# Patient Record
Sex: MALE | Race: WHITE | Employment: UNEMPLOYED | URBAN - METROPOLITAN AREA
[De-identification: names, ages, dates, MRNs, and addresses within clinical notes are randomized per-mention and may not be internally consistent; named-entity substitution may affect disease eponyms.]

---

## 2017-01-26 PROBLEM — G56.00 CARPAL TUNNEL SYNDROME: Status: ACTIVE | Noted: 2017-01-26

## 2017-01-26 PROBLEM — G56.20 CUBITAL TUNNEL SYNDROME: Status: ACTIVE | Noted: 2017-01-26

## 2017-03-10 PROBLEM — Z72.0 TOBACCO USE: Status: ACTIVE | Noted: 2017-03-10

## 2018-02-12 PROBLEM — R53.83 FATIGUE: Status: ACTIVE | Noted: 2018-02-12

## 2018-03-13 ENCOUNTER — TELEPHONE (OUTPATIENT)
Dept: FAMILY MEDICINE CLINIC | Age: 38
End: 2018-03-13

## 2018-03-16 ENCOUNTER — TELEPHONE (OUTPATIENT)
Dept: FAMILY MEDICINE CLINIC | Age: 38
End: 2018-03-16

## 2018-03-16 NOTE — TELEPHONE ENCOUNTER
Spoke with wife, Alireza Bowers and she stated that pt has a lump under his armpit area that is tender. Advised Ready care and wife was in agreement.

## 2018-04-11 ENCOUNTER — TELEPHONE (OUTPATIENT)
Dept: FAMILY MEDICINE CLINIC | Age: 38
End: 2018-04-11

## 2018-04-30 ENCOUNTER — HOSPITAL ENCOUNTER (OUTPATIENT)
Dept: MRI IMAGING | Age: 38
Discharge: HOME | End: 2018-05-02
Payer: COMMERCIAL

## 2018-04-30 DIAGNOSIS — M54.12 CERVICAL RADICULOPATHY: ICD-10-CM

## 2018-04-30 DIAGNOSIS — M54.2 NECK PAIN: ICD-10-CM

## 2018-04-30 PROCEDURE — 72141 MRI NECK SPINE W/O DYE: CPT

## 2018-05-03 ENCOUNTER — HOSPITAL ENCOUNTER (EMERGENCY)
Age: 38
Discharge: HOME | End: 2018-05-03
Payer: COMMERCIAL

## 2018-05-03 VITALS
DIASTOLIC BLOOD PRESSURE: 90 MMHG | OXYGEN SATURATION: 98 % | HEIGHT: 70 IN | WEIGHT: 192 LBS | TEMPERATURE: 97.9 F | SYSTOLIC BLOOD PRESSURE: 134 MMHG | HEART RATE: 83 BPM | RESPIRATION RATE: 14 BRPM | BODY MASS INDEX: 27.49 KG/M2

## 2018-05-03 DIAGNOSIS — M54.2 NECK PAIN: Primary | ICD-10-CM

## 2018-05-03 PROCEDURE — 99282 EMERGENCY DEPT VISIT SF MDM: CPT

## 2018-05-03 PROCEDURE — 6360000002 HC RX W HCPCS: Performed by: NURSE PRACTITIONER

## 2018-05-03 PROCEDURE — 96372 THER/PROPH/DIAG INJ SC/IM: CPT

## 2018-05-03 RX ORDER — DEXAMETHASONE SODIUM PHOSPHATE 10 MG/ML
10 INJECTION INTRAMUSCULAR; INTRAVENOUS ONCE
Status: COMPLETED | OUTPATIENT
Start: 2018-05-03 | End: 2018-05-03

## 2018-05-03 RX ORDER — NAPROXEN 500 MG/1
500 TABLET ORAL 2 TIMES DAILY
Qty: 14 TABLET | Refills: 0 | Status: SHIPPED | OUTPATIENT
Start: 2018-05-03 | End: 2018-07-17 | Stop reason: ALTCHOICE

## 2018-05-03 RX ORDER — KETOROLAC TROMETHAMINE 30 MG/ML
30 INJECTION, SOLUTION INTRAMUSCULAR; INTRAVENOUS ONCE
Status: COMPLETED | OUTPATIENT
Start: 2018-05-03 | End: 2018-05-03

## 2018-05-03 RX ORDER — NAPROXEN 250 MG/1
500 TABLET ORAL ONCE
Status: DISCONTINUED | OUTPATIENT
Start: 2018-05-03 | End: 2018-05-03

## 2018-05-03 RX ORDER — METAXALONE 800 MG/1
800 TABLET ORAL 3 TIMES DAILY
Qty: 15 TABLET | Refills: 0 | Status: SHIPPED | OUTPATIENT
Start: 2018-05-03 | End: 2018-05-08

## 2018-05-03 RX ORDER — METHYLPREDNISOLONE 4 MG/1
TABLET ORAL
Qty: 21 TABLET | Status: SHIPPED | OUTPATIENT
Start: 2018-05-03 | End: 2018-05-09

## 2018-05-03 RX ORDER — ORPHENADRINE CITRATE 30 MG/ML
60 INJECTION INTRAMUSCULAR; INTRAVENOUS ONCE
Status: COMPLETED | OUTPATIENT
Start: 2018-05-03 | End: 2018-05-03

## 2018-05-03 RX ADMIN — DEXAMETHASONE SODIUM PHOSPHATE 10 MG: 10 INJECTION INTRAMUSCULAR; INTRAVENOUS at 13:23

## 2018-05-03 RX ADMIN — ORPHENADRINE CITRATE 60 MG: 30 INJECTION INTRAMUSCULAR; INTRAVENOUS at 13:24

## 2018-05-03 RX ADMIN — KETOROLAC TROMETHAMINE 30 MG: 30 INJECTION, SOLUTION INTRAMUSCULAR at 13:23

## 2018-05-03 ASSESSMENT — PAIN DESCRIPTION - PAIN TYPE: TYPE: ACUTE PAIN

## 2018-05-03 ASSESSMENT — PAIN DESCRIPTION - LOCATION: LOCATION: NECK

## 2018-05-03 ASSESSMENT — PAIN SCALES - GENERAL: PAINLEVEL_OUTOF10: 9

## 2018-05-04 ENCOUNTER — CARE COORDINATION (OUTPATIENT)
Dept: CARE COORDINATION | Age: 38
End: 2018-05-04

## 2018-05-08 ENCOUNTER — OFFICE VISIT (OUTPATIENT)
Dept: NEUROSURGERY | Age: 38
End: 2018-05-08
Payer: COMMERCIAL

## 2018-05-08 VITALS
BODY MASS INDEX: 27.2 KG/M2 | DIASTOLIC BLOOD PRESSURE: 96 MMHG | HEIGHT: 70 IN | HEART RATE: 79 BPM | SYSTOLIC BLOOD PRESSURE: 131 MMHG | WEIGHT: 190 LBS

## 2018-05-08 DIAGNOSIS — M54.2 NECK PAIN: Primary | ICD-10-CM

## 2018-05-08 PROCEDURE — 99215 OFFICE O/P EST HI 40 MIN: CPT | Performed by: NEUROLOGICAL SURGERY

## 2018-05-08 PROCEDURE — G8419 CALC BMI OUT NRM PARAM NOF/U: HCPCS | Performed by: NEUROLOGICAL SURGERY

## 2018-05-08 PROCEDURE — 4004F PT TOBACCO SCREEN RCVD TLK: CPT | Performed by: NEUROLOGICAL SURGERY

## 2018-05-08 PROCEDURE — G8427 DOCREV CUR MEDS BY ELIG CLIN: HCPCS | Performed by: NEUROLOGICAL SURGERY

## 2018-05-08 RX ORDER — HYDROCODONE BITARTRATE AND ACETAMINOPHEN 5; 325 MG/1; MG/1
2 TABLET ORAL EVERY 6 HOURS PRN
Qty: 56 TABLET | Refills: 0 | Status: SHIPPED | OUTPATIENT
Start: 2018-05-08 | End: 2018-05-15

## 2018-05-08 ASSESSMENT — ENCOUNTER SYMPTOMS
SHORTNESS OF BREATH: 1
EYES NEGATIVE: 1
TROUBLE SWALLOWING: 0
VISUAL CHANGE: 0
GASTROINTESTINAL NEGATIVE: 1

## 2018-05-15 ENCOUNTER — TELEPHONE (OUTPATIENT)
Dept: NEUROSURGERY | Age: 38
End: 2018-05-15

## 2018-05-18 ENCOUNTER — TELEPHONE (OUTPATIENT)
Dept: FAMILY MEDICINE CLINIC | Age: 38
End: 2018-05-18

## 2018-05-18 DIAGNOSIS — Z72.0 TOBACCO USE: Primary | ICD-10-CM

## 2018-05-18 RX ORDER — NICOTINE 21 MG/24HR
1 PATCH, TRANSDERMAL 24 HOURS TRANSDERMAL EVERY 24 HOURS
Qty: 30 PATCH | Refills: 1 | Status: ON HOLD | OUTPATIENT
Start: 2018-05-18 | End: 2018-07-21 | Stop reason: HOSPADM

## 2018-06-19 ENCOUNTER — TELEPHONE (OUTPATIENT)
Dept: NEUROSURGERY | Age: 38
End: 2018-06-19

## 2018-06-25 ENCOUNTER — TELEPHONE (OUTPATIENT)
Dept: NEUROSURGERY | Age: 38
End: 2018-06-25

## 2018-06-25 NOTE — TELEPHONE ENCOUNTER
Pt. Wife called in wanting to know if her  Dena Merrill can have his surgery sooner than scheduled. She also stated he is in pain and even though she was explained that Dr. Alesha Portillo prescribe pain med, she want to know if he could get pain meds. Because he is in a lot of pain.     Audrey: [phone]

## 2018-06-27 ENCOUNTER — PREP FOR PROCEDURE (OUTPATIENT)
Dept: NEUROSURGERY | Age: 38
End: 2018-06-27

## 2018-06-27 DIAGNOSIS — M50.20 CERVICAL HERNIATED DISC: Primary | ICD-10-CM

## 2018-06-27 RX ORDER — SODIUM CHLORIDE 0.9 % (FLUSH) 0.9 %
10 SYRINGE (ML) INJECTION EVERY 12 HOURS SCHEDULED
Status: CANCELLED | OUTPATIENT
Start: 2018-06-27 | End: 2019-06-27

## 2018-06-27 RX ORDER — SODIUM CHLORIDE 9 MG/ML
INJECTION, SOLUTION INTRAVENOUS CONTINUOUS
Status: CANCELLED | OUTPATIENT
Start: 2018-06-27 | End: 2019-06-27

## 2018-06-27 RX ORDER — SODIUM CHLORIDE 0.9 % (FLUSH) 0.9 %
10 SYRINGE (ML) INJECTION PRN
Status: CANCELLED | OUTPATIENT
Start: 2018-06-27 | End: 2019-06-27

## 2018-06-29 DIAGNOSIS — M50.20 CERVICAL HERNIATED DISC: Primary | ICD-10-CM

## 2018-07-17 ENCOUNTER — ANESTHESIA EVENT (OUTPATIENT)
Dept: OPERATING ROOM | Age: 38
End: 2018-07-17
Payer: COMMERCIAL

## 2018-07-17 ENCOUNTER — HOSPITAL ENCOUNTER (OUTPATIENT)
Dept: PREADMISSION TESTING | Age: 38
Discharge: HOME | End: 2018-07-17
Payer: COMMERCIAL

## 2018-07-17 ENCOUNTER — HOSPITAL ENCOUNTER (OUTPATIENT)
Dept: GENERAL RADIOLOGY | Age: 38
Discharge: HOME | End: 2018-07-19
Payer: COMMERCIAL

## 2018-07-17 VITALS
HEART RATE: 65 BPM | SYSTOLIC BLOOD PRESSURE: 131 MMHG | OXYGEN SATURATION: 97 % | DIASTOLIC BLOOD PRESSURE: 91 MMHG | WEIGHT: 193 LBS | BODY MASS INDEX: 27.02 KG/M2 | TEMPERATURE: 98.2 F | HEIGHT: 71 IN | RESPIRATION RATE: 20 BRPM

## 2018-07-17 DIAGNOSIS — Z01.812 PRE-OPERATIVE LABORATORY EXAMINATION: Primary | ICD-10-CM

## 2018-07-17 DIAGNOSIS — M50.20 CERVICAL HERNIATED DISC: ICD-10-CM

## 2018-07-17 LAB
ABO/RH: NORMAL
ALBUMIN SERPL-MCNC: 4.5 G/DL (ref 3.5–5.2)
ALP BLD-CCNC: 79 U/L (ref 40–129)
ALT SERPL-CCNC: 27 U/L (ref 0–40)
ANION GAP SERPL CALCULATED.3IONS-SCNC: 11 MMOL/L (ref 7–16)
ANTIBODY SCREEN: NORMAL
APTT: 30.9 SEC (ref 24.5–35.1)
AST SERPL-CCNC: 19 U/L (ref 0–39)
BASOPHILS ABSOLUTE: 0.04 E9/L (ref 0–0.2)
BASOPHILS RELATIVE PERCENT: 0.4 % (ref 0–2)
BILIRUB SERPL-MCNC: 0.3 MG/DL (ref 0–1.2)
BILIRUBIN URINE: NEGATIVE
BLOOD, URINE: NEGATIVE
BUN BLDV-MCNC: 10 MG/DL (ref 6–20)
CALCIUM SERPL-MCNC: 9.6 MG/DL (ref 8.6–10.2)
CHLORIDE BLD-SCNC: 102 MMOL/L (ref 98–107)
CLARITY: CLEAR
CO2: 27 MMOL/L (ref 22–29)
COLOR: YELLOW
CREAT SERPL-MCNC: 0.8 MG/DL (ref 0.7–1.2)
EKG ATRIAL RATE: 64 BPM
EKG P AXIS: 21 DEGREES
EKG P-R INTERVAL: 158 MS
EKG Q-T INTERVAL: 418 MS
EKG QRS DURATION: 104 MS
EKG QTC CALCULATION (BAZETT): 431 MS
EKG R AXIS: 27 DEGREES
EKG T AXIS: 43 DEGREES
EKG VENTRICULAR RATE: 64 BPM
EOSINOPHILS ABSOLUTE: 0.53 E9/L (ref 0.05–0.5)
EOSINOPHILS RELATIVE PERCENT: 5.8 % (ref 0–6)
GFR AFRICAN AMERICAN: >60
GFR NON-AFRICAN AMERICAN: >60 ML/MIN/1.73
GLUCOSE BLD-MCNC: 84 MG/DL (ref 74–109)
GLUCOSE URINE: NEGATIVE MG/DL
HCT VFR BLD CALC: 42.7 % (ref 37–54)
HEMOGLOBIN: 14.8 G/DL (ref 12.5–16.5)
IMMATURE GRANULOCYTES #: 0.02 E9/L
IMMATURE GRANULOCYTES %: 0.2 % (ref 0–5)
INR BLD: 1
KETONES, URINE: NEGATIVE MG/DL
LEUKOCYTE ESTERASE, URINE: NEGATIVE
LYMPHOCYTES ABSOLUTE: 3.04 E9/L (ref 1.5–4)
LYMPHOCYTES RELATIVE PERCENT: 33 % (ref 20–42)
MCH RBC QN AUTO: 29.8 PG (ref 26–35)
MCHC RBC AUTO-ENTMCNC: 34.7 % (ref 32–34.5)
MCV RBC AUTO: 85.9 FL (ref 80–99.9)
MONOCYTES ABSOLUTE: 0.52 E9/L (ref 0.1–0.95)
MONOCYTES RELATIVE PERCENT: 5.7 % (ref 2–12)
NEUTROPHILS ABSOLUTE: 5.05 E9/L (ref 1.8–7.3)
NEUTROPHILS RELATIVE PERCENT: 54.9 % (ref 43–80)
NITRITE, URINE: NEGATIVE
PDW BLD-RTO: 13.2 FL (ref 11.5–15)
PH UA: 5.5 (ref 5–9)
PLATELET # BLD: 316 E9/L (ref 130–450)
PMV BLD AUTO: 9.2 FL (ref 7–12)
POTASSIUM SERPL-SCNC: 3.5 MMOL/L (ref 3.5–5)
PROTEIN UA: NEGATIVE MG/DL
PROTHROMBIN TIME: 11.9 SEC (ref 9.3–12.4)
RBC # BLD: 4.97 E12/L (ref 3.8–5.8)
SODIUM BLD-SCNC: 140 MMOL/L (ref 132–146)
SPECIFIC GRAVITY UA: >=1.03 (ref 1–1.03)
TOTAL PROTEIN: 7.2 G/DL (ref 6.4–8.3)
UROBILINOGEN, URINE: 0.2 E.U./DL
WBC # BLD: 9.2 E9/L (ref 4.5–11.5)

## 2018-07-17 PROCEDURE — 86850 RBC ANTIBODY SCREEN: CPT

## 2018-07-17 PROCEDURE — 87081 CULTURE SCREEN ONLY: CPT

## 2018-07-17 PROCEDURE — 36415 COLL VENOUS BLD VENIPUNCTURE: CPT

## 2018-07-17 PROCEDURE — 85730 THROMBOPLASTIN TIME PARTIAL: CPT

## 2018-07-17 PROCEDURE — 81003 URINALYSIS AUTO W/O SCOPE: CPT

## 2018-07-17 PROCEDURE — 87088 URINE BACTERIA CULTURE: CPT

## 2018-07-17 PROCEDURE — 86900 BLOOD TYPING SEROLOGIC ABO: CPT

## 2018-07-17 PROCEDURE — 85610 PROTHROMBIN TIME: CPT

## 2018-07-17 PROCEDURE — 80053 COMPREHEN METABOLIC PANEL: CPT

## 2018-07-17 PROCEDURE — 71046 X-RAY EXAM CHEST 2 VIEWS: CPT

## 2018-07-17 PROCEDURE — 85025 COMPLETE CBC W/AUTO DIFF WBC: CPT

## 2018-07-17 PROCEDURE — 86901 BLOOD TYPING SEROLOGIC RH(D): CPT

## 2018-07-17 ASSESSMENT — PAIN DESCRIPTION - DESCRIPTORS: DESCRIPTORS: ACHING;CONSTANT

## 2018-07-17 ASSESSMENT — PAIN DESCRIPTION - ORIENTATION: ORIENTATION: LEFT

## 2018-07-17 ASSESSMENT — PAIN SCALES - GENERAL: PAINLEVEL_OUTOF10: 7

## 2018-07-17 ASSESSMENT — PAIN DESCRIPTION - LOCATION: LOCATION: NECK;SHOULDER;ARM

## 2018-07-17 ASSESSMENT — PAIN DESCRIPTION - PAIN TYPE: TYPE: ACUTE PAIN

## 2018-07-17 NOTE — PROGRESS NOTES
Brittani 35 PRE-ADMISSION TESTING GENERAL INSTRUCTIONS- [facility]-phone number:[phone]    GENERAL INSTRUCTIONS  [x] Nhan wipe instruction sheet and wipes given. [x] Nothing by mouth after midnight, including gum, candy, mints, or water. [x] You may brush your teeth, gargle, but do NOT swallow water. .   [x]No smoking, chewing tobacco, illegal drugs, or alcohol within 24 hours of your surgery. [x] Jewelry, valuables or body piercing's should not be brought to the hospital. All body and/or tongue piercing's must be removed prior to arriving to hospital.  ALL hair pins must be removed. [x] Do not wear makeup, lotions, powders, deodorant. Nail polish as directed by the nurse. [x] Arrange transportation to and from the hospital.  Arrange for someone to be with you for the remainder of the day and for 24 hours after your procedure due to having had anesthesia. .  [x] Transfusion Bracelet: Please bring with you to hospital, day of surgery            PARKING INSTRUCTIONS:   · [x] Arrival [id number]  · [x] Parking lot 1 is located on [facility] (the corner of [facility]). To enter, press the button and the gate will lift. A free token will be provided to exit the lot. One car per patient is allowed to park in this lot. All other cars are to park on [address] [place] [address] either in the parking garage or the handicap lot. [] Free  parking is available on [address] [place] [address]. · [] To reach the [place] lobby from [address] [place] [address], upon entering the hospital, take elevator B to the 3rd floor. EDUCATION INSTRUCTIONS:      [] Knee or hip replacement booklet & exercise pamphlets given. [x] Megan 77 placed in chart. [x] Pre-admission Testing educational folder given  [x] Incentive Spirometry,coughing & deep breathing exercises reviewed.      [x]Medication information sheet(s)   [x]Fluoroscopy-Xray used in surgery reviewed with patient. Educational pamphlet placed in chart. [x]Pain: Post-op pain is normal and to be expected. You will be asked to rate your pain from 0-10(a zero is not acceptable-education is needed). Your post-op pain goal is:  [x] Ask your nurse for your pain medication. MEDICATION INSTRUCTIONS:   [x]Bring a complete list of your medications, please write the last time you took the medicine, give this list to the nurse. [x] Take the following medications the morning of surgery with 1-2 ounces of water:   [x] Stop herbal supplements and vitamins 5 days before your surgery. [] DO NOT take any diabetic medicine the morning of surgery. Follow instructions for insulin the day before surgery. [] If you are diabetic and your blood sugar is low or you feel symptomatic, you may drink 1-2 ounces of apple juice or take a glucose tablet. The morning of your procedure, you may call the pre-op area if you have concerns about your blood sugar [phone]. [] Use your inhalers the morning of surgery. Bring your emergency inhaler with you day of surgery. [x] Follow physician instructions regarding any blood thinners you may be taking. WHAT TO EXPECT:  [x] The day of surgery you will be greeted and  checked in by the [employer] .  A nurse will greet you in accordance to the time you are needed in the pre-op area to prepare you for surgery. Please do not be discouraged if you are not greeted in the order you arrive as there are many variables that are involved in patient preparation. Your patience is greatly appreciated as you wait for your nurse. Please bring in items such as: books, magazines, newspapers, electronics, or any other items  to occupy your time in the waiting area. [x]  Delays may occur with surgery and staff will make a sincere effort to keep you informed of delays.   If any delays occur with your procedure, we apologize ahead of time for your inconvenience as we recognize the value of your time.

## 2018-07-17 NOTE — ANESTHESIA PRE PROCEDURE
Allergies: Allergies   Allergen Reactions    Latex Rash     condoms    Pcn [Penicillins] Hives       Problem List:    Patient Active Problem List   Diagnosis Code    Pinched nerve G58.9    Palpitation R00.2    Essential hypertension I10    Chronic low back pain M54.5, G89.29    Lumbar radiculopathy M54.16    History of lumbar laminectomy/discectomy L4-5, left Z98.890    Carpal tunnel syndrome G56.00    Cubital tunnel syndrome G56.20    Tobacco use Z72.0    Fatigue R53.83       Past Medical History:        Diagnosis Date    Anxiety     Benign tumor of back     Headache(784.0)     HTN (hypertension) 2/19/2015    Hypertension     Pain induced    Neuropathy ([address]     right ulnar    Palpitation 2/19/2015    Right carpal tunnel syndrome        Past Surgical History:        Procedure Laterality Date    BACK SURGERY      BREAST LUMPECTOMY      KIDNEY SURGERY Left     lithotripsy    LUMBAR LAMINECTOMY  4/22/2015    discectomy L5 - S1       Social History:    Social History   Substance Use Topics    Smoking status: Former Smoker     Packs/day: 1.00     Types: Cigarettes    Smokeless tobacco: Never Used    Alcohol use No                                Counseling given: Not Answered      Vital Signs (Current):   Vitals:    07/17/18 0936   BP: (!) 131/91   Pulse: 65   Resp: 20   Temp: 98.2 °F (36.8 °C)   TempSrc: Oral   SpO2: 97%   Weight: 193 lb (87.5 kg)   Height: 5' 11\" (1.803 m)                                              BP Readings from Last 3 Encounters:   07/17/18 (!) 131/91   05/08/18 (!) 131/96   05/03/18 (!) 134/90       NPO Status:                                                                                 BMI:   Wt Readings from Last 3 Encounters:   07/17/18 193 lb (87.5 kg)   05/08/18 190 lb (86.2 kg)   05/03/18 192 lb (87.1 kg)     Body mass index is 26.92 kg/m².     CBC:   Lab Results   Component Value Date    WBC 11.4 02/19/2018    RBC 5.09 02/19/2018    HGB 15.1

## 2018-07-18 LAB — MRSA CULTURE ONLY: NORMAL

## 2018-07-19 LAB — URINE CULTURE, ROUTINE: NORMAL

## 2018-07-19 NOTE — H&P
Normal range of motion. He exhibits no edema, tenderness or deformity. Lymphadenopathy:     He has no cervical adenopathy. Neurological: He is alert and oriented to person, place, and time. He has normal strength. He is not disoriented. He displays no atrophy and normal reflexes. No cranial nerve deficit or sensory deficit. He exhibits normal muscle tone. He displays a negative Romberg sign. He displays no seizure activity. Coordination normal. GCS eye subscore is 4. GCS verbal subscore is 5. GCS motor subscore is 6. He displays no Babinski's sign on the left side. Reflex Scores:       Tricep reflexes are 2+ on the right side and 2+ on the left side. Bicep reflexes are 2+ on the right side and 2+ on the left side. Brachioradialis reflexes are 2+ on the right side and 2+ on the left side. Patellar reflexes are 2+ on the right side and 2+ on the left side. Achilles reflexes are 2+ on the right side and 2+ on the left side. Skin: Skin is warm and dry. No rash noted. He is not diaphoretic. No erythema. No pallor. Psychiatric: He has a normal mood and affect. His behavior is normal. Judgment and thought content normal.   Vitals reviewed.        Assessment:   44year old male who presents with neck pain that radiates into his arms. He is neurologically intact. His MRI shows a herniated disk at C5-C6. Plan:   I will send him to PT and pain management and if he fails this, he will need a C5-C6 anterior cervical diskectomy and fusion. R/B/A of surgery have been discussed with the patient and he wishes to proceed with surgery.

## 2018-07-20 ENCOUNTER — APPOINTMENT (OUTPATIENT)
Dept: GENERAL RADIOLOGY | Age: 38
End: 2018-07-20
Attending: NEUROLOGICAL SURGERY
Payer: COMMERCIAL

## 2018-07-20 ENCOUNTER — ANESTHESIA (OUTPATIENT)
Dept: OPERATING ROOM | Age: 38
End: 2018-07-20
Payer: COMMERCIAL

## 2018-07-20 ENCOUNTER — HOSPITAL ENCOUNTER (OUTPATIENT)
Age: 38
Discharge: HOME | End: 2018-07-21
Attending: NEUROLOGICAL SURGERY | Admitting: NEUROLOGICAL SURGERY
Payer: COMMERCIAL

## 2018-07-20 VITALS
OXYGEN SATURATION: 97 % | TEMPERATURE: 95.5 F | DIASTOLIC BLOOD PRESSURE: 86 MMHG | RESPIRATION RATE: 5 BRPM | SYSTOLIC BLOOD PRESSURE: 119 MMHG

## 2018-07-20 DIAGNOSIS — M50.20 CERVICAL HERNIATED DISC: Primary | ICD-10-CM

## 2018-07-20 DIAGNOSIS — T14.8XXA FRACTURE: ICD-10-CM

## 2018-07-20 PROCEDURE — 2580000003 HC RX 258: Performed by: PHYSICIAN ASSISTANT

## 2018-07-20 PROCEDURE — 6360000002 HC RX W HCPCS

## 2018-07-20 PROCEDURE — 6360000002 HC RX W HCPCS: Performed by: NEUROLOGICAL SURGERY

## 2018-07-20 PROCEDURE — 3600000004 HC SURGERY LEVEL 4 BASE: Performed by: NEUROLOGICAL SURGERY

## 2018-07-20 PROCEDURE — G8987 SELF CARE CURRENT STATUS: HCPCS

## 2018-07-20 PROCEDURE — 3700000001 HC ADD 15 MINUTES (ANESTHESIA): Performed by: NEUROLOGICAL SURGERY

## 2018-07-20 PROCEDURE — 97535 SELF CARE MNGMENT TRAINING: CPT

## 2018-07-20 PROCEDURE — 2700000000 HC OXYGEN THERAPY PER DAY

## 2018-07-20 PROCEDURE — 3209999900 FLUORO FOR SURGICAL PROCEDURES

## 2018-07-20 PROCEDURE — 2500000003 HC RX 250 WO HCPCS

## 2018-07-20 PROCEDURE — 2580000003 HC RX 258: Performed by: NEUROLOGICAL SURGERY

## 2018-07-20 PROCEDURE — G8988 SELF CARE GOAL STATUS: HCPCS

## 2018-07-20 PROCEDURE — 7100000000 HC PACU RECOVERY - FIRST 15 MIN: Performed by: NEUROLOGICAL SURGERY

## 2018-07-20 PROCEDURE — 2500000003 HC RX 250 WO HCPCS: Performed by: ANESTHESIOLOGY

## 2018-07-20 PROCEDURE — L8699 PROSTHETIC IMPLANT NOS: HCPCS | Performed by: NEUROLOGICAL SURGERY

## 2018-07-20 PROCEDURE — 2720000010 HC SURG SUPPLY STERILE: Performed by: NEUROLOGICAL SURGERY

## 2018-07-20 PROCEDURE — C1713 ANCHOR/SCREW BN/BN,TIS/BN: HCPCS | Performed by: NEUROLOGICAL SURGERY

## 2018-07-20 PROCEDURE — 88304 TISSUE EXAM BY PATHOLOGIST: CPT

## 2018-07-20 PROCEDURE — 22845 INSERT SPINE FIXATION DEVICE: CPT | Performed by: NEUROLOGICAL SURGERY

## 2018-07-20 PROCEDURE — 2500000003 HC RX 250 WO HCPCS: Performed by: NEUROLOGICAL SURGERY

## 2018-07-20 PROCEDURE — 97165 OT EVAL LOW COMPLEX 30 MIN: CPT

## 2018-07-20 PROCEDURE — 6370000000 HC RX 637 (ALT 250 FOR IP): Performed by: NEUROLOGICAL SURGERY

## 2018-07-20 PROCEDURE — 2580000003 HC RX 258

## 2018-07-20 PROCEDURE — 3600000014 HC SURGERY LEVEL 4 ADDTL 15MIN: Performed by: NEUROLOGICAL SURGERY

## 2018-07-20 PROCEDURE — 3700000000 HC ANESTHESIA ATTENDED CARE: Performed by: NEUROLOGICAL SURGERY

## 2018-07-20 PROCEDURE — 7100000001 HC PACU RECOVERY - ADDTL 15 MIN: Performed by: NEUROLOGICAL SURGERY

## 2018-07-20 PROCEDURE — 22845 INSERT SPINE FIXATION DEVICE: CPT | Performed by: PHYSICIAN ASSISTANT

## 2018-07-20 PROCEDURE — 2709999900 HC NON-CHARGEABLE SUPPLY: Performed by: NEUROLOGICAL SURGERY

## 2018-07-20 PROCEDURE — 22551 ARTHRD ANT NTRBDY CERVICAL: CPT | Performed by: NEUROLOGICAL SURGERY

## 2018-07-20 PROCEDURE — 22551 ARTHRD ANT NTRBDY CERVICAL: CPT | Performed by: PHYSICIAN ASSISTANT

## 2018-07-20 PROCEDURE — 6360000002 HC RX W HCPCS: Performed by: ANESTHESIOLOGY

## 2018-07-20 PROCEDURE — 20931 SP BONE ALGRFT STRUCT ADD-ON: CPT | Performed by: NEUROLOGICAL SURGERY

## 2018-07-20 DEVICE — GRAFT HUM TISS W11XH7XL14MM CANC CORT FRZ DRY CRNRSTN ASR: Type: IMPLANTABLE DEVICE | Status: FUNCTIONAL

## 2018-07-20 DEVICE — SCREW 7713515 ZEVO VAR SD 3.5MM X 15MM
Type: IMPLANTABLE DEVICE | Status: FUNCTIONAL
Brand: ZEVO™ ANTERIOR CERVICAL PLATE SYSTEM

## 2018-07-20 DEVICE — PLATE 3001017 ZEVO 17MM 1 LVL
Type: IMPLANTABLE DEVICE | Status: FUNCTIONAL
Brand: ZEVO™ ANTERIOR CERVICAL PLATE SYSTEM

## 2018-07-20 RX ORDER — DEXAMETHASONE SODIUM PHOSPHATE 10 MG/ML
INJECTION INTRAMUSCULAR; INTRAVENOUS PRN
Status: DISCONTINUED | OUTPATIENT
Start: 2018-07-20 | End: 2018-07-20 | Stop reason: SDUPTHER

## 2018-07-20 RX ORDER — ONDANSETRON 2 MG/ML
4 INJECTION INTRAMUSCULAR; INTRAVENOUS EVERY 6 HOURS PRN
Status: DISCONTINUED | OUTPATIENT
Start: 2018-07-20 | End: 2018-07-21 | Stop reason: HOSPADM

## 2018-07-20 RX ORDER — ROCURONIUM BROMIDE 10 MG/ML
INJECTION, SOLUTION INTRAVENOUS PRN
Status: DISCONTINUED | OUTPATIENT
Start: 2018-07-20 | End: 2018-07-20 | Stop reason: SDUPTHER

## 2018-07-20 RX ORDER — MEPERIDINE HYDROCHLORIDE 50 MG/ML
12.5 INJECTION INTRAMUSCULAR; INTRAVENOUS; SUBCUTANEOUS EVERY 5 MIN PRN
Status: DISCONTINUED | OUTPATIENT
Start: 2018-07-20 | End: 2018-07-20 | Stop reason: HOSPADM

## 2018-07-20 RX ORDER — MORPHINE SULFATE 4 MG/ML
4 INJECTION, SOLUTION INTRAMUSCULAR; INTRAVENOUS
Status: DISCONTINUED | OUTPATIENT
Start: 2018-07-20 | End: 2018-07-21 | Stop reason: HOSPADM

## 2018-07-20 RX ORDER — ONDANSETRON 2 MG/ML
INJECTION INTRAMUSCULAR; INTRAVENOUS PRN
Status: DISCONTINUED | OUTPATIENT
Start: 2018-07-20 | End: 2018-07-20 | Stop reason: SDUPTHER

## 2018-07-20 RX ORDER — ACETAMINOPHEN 325 MG/1
650 TABLET ORAL EVERY 4 HOURS PRN
Status: DISCONTINUED | OUTPATIENT
Start: 2018-07-20 | End: 2018-07-21 | Stop reason: HOSPADM

## 2018-07-20 RX ORDER — LAMOTRIGINE 100 MG/1
100 TABLET ORAL NIGHTLY
Status: DISCONTINUED | OUTPATIENT
Start: 2018-07-20 | End: 2018-07-21 | Stop reason: HOSPADM

## 2018-07-20 RX ORDER — MORPHINE SULFATE 2 MG/ML
2 INJECTION, SOLUTION INTRAMUSCULAR; INTRAVENOUS
Status: DISCONTINUED | OUTPATIENT
Start: 2018-07-20 | End: 2018-07-21 | Stop reason: HOSPADM

## 2018-07-20 RX ORDER — DEXTROAMPHETAMINE SACCHARATE, AMPHETAMINE ASPARTATE MONOHYDRATE, DEXTROAMPHETAMINE SULFATE AND AMPHETAMINE SULFATE 2.5; 2.5; 2.5; 2.5 MG/1; MG/1; MG/1; MG/1
30 CAPSULE, EXTENDED RELEASE ORAL EVERY MORNING
Status: DISCONTINUED | OUTPATIENT
Start: 2018-07-20 | End: 2018-07-21 | Stop reason: HOSPADM

## 2018-07-20 RX ORDER — SODIUM CHLORIDE 0.9 % (FLUSH) 0.9 %
10 SYRINGE (ML) INJECTION EVERY 12 HOURS SCHEDULED
Status: DISCONTINUED | OUTPATIENT
Start: 2018-07-20 | End: 2018-07-21 | Stop reason: HOSPADM

## 2018-07-20 RX ORDER — NEOSTIGMINE METHYLSULFATE 0.5 MG/ML
INJECTION, SOLUTION INTRAVENOUS PRN
Status: DISCONTINUED | OUTPATIENT
Start: 2018-07-20 | End: 2018-07-20 | Stop reason: SDUPTHER

## 2018-07-20 RX ORDER — MIDAZOLAM HYDROCHLORIDE 1 MG/ML
INJECTION INTRAMUSCULAR; INTRAVENOUS PRN
Status: DISCONTINUED | OUTPATIENT
Start: 2018-07-20 | End: 2018-07-20 | Stop reason: SDUPTHER

## 2018-07-20 RX ORDER — CEFAZOLIN SODIUM 1 G/3ML
INJECTION, POWDER, FOR SOLUTION INTRAMUSCULAR; INTRAVENOUS PRN
Status: DISCONTINUED | OUTPATIENT
Start: 2018-07-20 | End: 2018-07-20 | Stop reason: SDUPTHER

## 2018-07-20 RX ORDER — SODIUM CHLORIDE 0.9 % (FLUSH) 0.9 %
10 SYRINGE (ML) INJECTION PRN
Status: DISCONTINUED | OUTPATIENT
Start: 2018-07-20 | End: 2018-07-20 | Stop reason: HOSPADM

## 2018-07-20 RX ORDER — GABAPENTIN 300 MG/1
600 CAPSULE ORAL 3 TIMES DAILY
Status: DISCONTINUED | OUTPATIENT
Start: 2018-07-20 | End: 2018-07-21 | Stop reason: HOSPADM

## 2018-07-20 RX ORDER — DOCUSATE SODIUM 100 MG/1
100 CAPSULE, LIQUID FILLED ORAL 2 TIMES DAILY
Status: DISCONTINUED | OUTPATIENT
Start: 2018-07-20 | End: 2018-07-21 | Stop reason: HOSPADM

## 2018-07-20 RX ORDER — SODIUM CHLORIDE 9 MG/ML
INJECTION, SOLUTION INTRAVENOUS CONTINUOUS
Status: DISCONTINUED | OUTPATIENT
Start: 2018-07-20 | End: 2018-07-20

## 2018-07-20 RX ORDER — SODIUM CHLORIDE 0.9 % (FLUSH) 0.9 %
10 SYRINGE (ML) INJECTION PRN
Status: DISCONTINUED | OUTPATIENT
Start: 2018-07-20 | End: 2018-07-21 | Stop reason: HOSPADM

## 2018-07-20 RX ORDER — HYDROCODONE BITARTRATE AND ACETAMINOPHEN 5; 325 MG/1; MG/1
1 TABLET ORAL EVERY 4 HOURS PRN
Status: DISCONTINUED | OUTPATIENT
Start: 2018-07-20 | End: 2018-07-21 | Stop reason: HOSPADM

## 2018-07-20 RX ORDER — VANCOMYCIN HYDROCHLORIDE 500 MG/10ML
INJECTION, POWDER, LYOPHILIZED, FOR SOLUTION INTRAVENOUS PRN
Status: DISCONTINUED | OUTPATIENT
Start: 2018-07-20 | End: 2018-07-20 | Stop reason: HOSPADM

## 2018-07-20 RX ORDER — PROPOFOL 10 MG/ML
INJECTION, EMULSION INTRAVENOUS PRN
Status: DISCONTINUED | OUTPATIENT
Start: 2018-07-20 | End: 2018-07-20 | Stop reason: SDUPTHER

## 2018-07-20 RX ORDER — DULOXETIN HYDROCHLORIDE 60 MG/1
60 CAPSULE, DELAYED RELEASE ORAL EVERY EVENING
Status: DISCONTINUED | OUTPATIENT
Start: 2018-07-20 | End: 2018-07-21 | Stop reason: HOSPADM

## 2018-07-20 RX ORDER — CYCLOBENZAPRINE HCL 10 MG
10 TABLET ORAL 3 TIMES DAILY PRN
Status: DISCONTINUED | OUTPATIENT
Start: 2018-07-20 | End: 2018-07-21 | Stop reason: HOSPADM

## 2018-07-20 RX ORDER — GLYCOPYRROLATE 1 MG/5 ML
SYRINGE (ML) INTRAVENOUS PRN
Status: DISCONTINUED | OUTPATIENT
Start: 2018-07-20 | End: 2018-07-20 | Stop reason: SDUPTHER

## 2018-07-20 RX ORDER — PROMETHAZINE HYDROCHLORIDE 25 MG/ML
25 INJECTION, SOLUTION INTRAMUSCULAR; INTRAVENOUS PRN
Status: DISCONTINUED | OUTPATIENT
Start: 2018-07-20 | End: 2018-07-20 | Stop reason: HOSPADM

## 2018-07-20 RX ORDER — SODIUM CHLORIDE 0.9 % (FLUSH) 0.9 %
10 SYRINGE (ML) INJECTION EVERY 12 HOURS SCHEDULED
Status: DISCONTINUED | OUTPATIENT
Start: 2018-07-20 | End: 2018-07-20 | Stop reason: HOSPADM

## 2018-07-20 RX ORDER — FENTANYL CITRATE 50 UG/ML
INJECTION, SOLUTION INTRAMUSCULAR; INTRAVENOUS PRN
Status: DISCONTINUED | OUTPATIENT
Start: 2018-07-20 | End: 2018-07-20 | Stop reason: SDUPTHER

## 2018-07-20 RX ORDER — HYDROCODONE BITARTRATE AND ACETAMINOPHEN 5; 325 MG/1; MG/1
2 TABLET ORAL EVERY 4 HOURS PRN
Status: DISCONTINUED | OUTPATIENT
Start: 2018-07-20 | End: 2018-07-21 | Stop reason: HOSPADM

## 2018-07-20 RX ORDER — DEXAMETHASONE SODIUM PHOSPHATE 4 MG/ML
4 INJECTION, SOLUTION INTRA-ARTICULAR; INTRALESIONAL; INTRAMUSCULAR; INTRAVENOUS; SOFT TISSUE EVERY 6 HOURS
Status: COMPLETED | OUTPATIENT
Start: 2018-07-20 | End: 2018-07-21

## 2018-07-20 RX ORDER — SODIUM CHLORIDE 9 MG/ML
INJECTION, SOLUTION INTRAVENOUS CONTINUOUS PRN
Status: DISCONTINUED | OUTPATIENT
Start: 2018-07-20 | End: 2018-07-20 | Stop reason: SDUPTHER

## 2018-07-20 RX ORDER — LABETALOL HYDROCHLORIDE 5 MG/ML
5 INJECTION, SOLUTION INTRAVENOUS EVERY 10 MIN PRN
Status: DISCONTINUED | OUTPATIENT
Start: 2018-07-20 | End: 2018-07-20 | Stop reason: HOSPADM

## 2018-07-20 RX ORDER — FAMOTIDINE 20 MG/1
20 TABLET, FILM COATED ORAL 2 TIMES DAILY
Status: DISCONTINUED | OUTPATIENT
Start: 2018-07-20 | End: 2018-07-21 | Stop reason: HOSPADM

## 2018-07-20 RX ADMIN — FAMOTIDINE 20 MG: 20 TABLET ORAL at 12:30

## 2018-07-20 RX ADMIN — DEXTROSE MONOHYDRATE 2 G: 50 INJECTION, SOLUTION INTRAVENOUS at 22:15

## 2018-07-20 RX ADMIN — MIDAZOLAM 2 MG: 1 INJECTION INTRAMUSCULAR; INTRAVENOUS at 06:32

## 2018-07-20 RX ADMIN — DEXAMETHASONE SODIUM PHOSPHATE 4 MG: 4 INJECTION, SOLUTION INTRA-ARTICULAR; INTRALESIONAL; INTRAMUSCULAR; INTRAVENOUS; SOFT TISSUE at 16:22

## 2018-07-20 RX ADMIN — HYDROCODONE BITARTRATE AND ACETAMINOPHEN 2 TABLET: 5; 325 TABLET ORAL at 22:12

## 2018-07-20 RX ADMIN — LIDOCAINE HYDROCHLORIDE 100 MG: 20 INJECTION, SOLUTION INTRAVENOUS at 06:37

## 2018-07-20 RX ADMIN — DEXAMETHASONE SODIUM PHOSPHATE 4 MG: 4 INJECTION, SOLUTION INTRA-ARTICULAR; INTRALESIONAL; INTRAMUSCULAR; INTRAVENOUS; SOFT TISSUE at 12:30

## 2018-07-20 RX ADMIN — LAMOTRIGINE 100 MG: 100 TABLET ORAL at 21:25

## 2018-07-20 RX ADMIN — MORPHINE SULFATE 4 MG: 4 INJECTION INTRAVENOUS at 16:22

## 2018-07-20 RX ADMIN — HYDROMORPHONE HYDROCHLORIDE 0.5 MG: 1 INJECTION, SOLUTION INTRAMUSCULAR; INTRAVENOUS; SUBCUTANEOUS at 08:33

## 2018-07-20 RX ADMIN — MORPHINE SULFATE 4 MG: 4 INJECTION INTRAVENOUS at 10:23

## 2018-07-20 RX ADMIN — SODIUM CHLORIDE: 9 INJECTION, SOLUTION INTRAVENOUS at 06:20

## 2018-07-20 RX ADMIN — Medication 1 LOZENGE: at 12:26

## 2018-07-20 RX ADMIN — CYCLOBENZAPRINE 10 MG: 10 TABLET, FILM COATED ORAL at 20:07

## 2018-07-20 RX ADMIN — HYDROMORPHONE HYDROCHLORIDE 0.5 MG: 1 INJECTION, SOLUTION INTRAMUSCULAR; INTRAVENOUS; SUBCUTANEOUS at 08:25

## 2018-07-20 RX ADMIN — FENTANYL CITRATE 50 MCG: 50 INJECTION, SOLUTION INTRAMUSCULAR; INTRAVENOUS at 07:03

## 2018-07-20 RX ADMIN — DOCUSATE SODIUM 100 MG: 100 CAPSULE, LIQUID FILLED ORAL at 12:30

## 2018-07-20 RX ADMIN — HYDROCODONE BITARTRATE AND ACETAMINOPHEN 2 TABLET: 5; 325 TABLET ORAL at 17:36

## 2018-07-20 RX ADMIN — GABAPENTIN 600 MG: 300 CAPSULE ORAL at 21:25

## 2018-07-20 RX ADMIN — CYCLOBENZAPRINE 10 MG: 10 TABLET, FILM COATED ORAL at 12:31

## 2018-07-20 RX ADMIN — Medication 10 ML: at 23:37

## 2018-07-20 RX ADMIN — MORPHINE SULFATE 4 MG: 4 INJECTION INTRAVENOUS at 13:55

## 2018-07-20 RX ADMIN — ROCURONIUM BROMIDE 40 MG: 10 INJECTION, SOLUTION INTRAVENOUS at 06:37

## 2018-07-20 RX ADMIN — Medication 10 ML: at 20:07

## 2018-07-20 RX ADMIN — FENTANYL CITRATE 50 MCG: 50 INJECTION, SOLUTION INTRAMUSCULAR; INTRAVENOUS at 07:06

## 2018-07-20 RX ADMIN — DEXAMETHASONE SODIUM PHOSPHATE 4 MG: 4 INJECTION, SOLUTION INTRA-ARTICULAR; INTRALESIONAL; INTRAMUSCULAR; INTRAVENOUS; SOFT TISSUE at 22:12

## 2018-07-20 RX ADMIN — MORPHINE SULFATE 4 MG: 4 INJECTION INTRAVENOUS at 20:07

## 2018-07-20 RX ADMIN — Medication 0.6 MG: at 07:57

## 2018-07-20 RX ADMIN — Medication 10 ML: at 13:55

## 2018-07-20 RX ADMIN — DEXTROSE MONOHYDRATE 2 G: 50 INJECTION, SOLUTION INTRAVENOUS at 15:01

## 2018-07-20 RX ADMIN — MORPHINE SULFATE 4 MG: 4 INJECTION INTRAVENOUS at 23:37

## 2018-07-20 RX ADMIN — HYDROCODONE BITARTRATE AND ACETAMINOPHEN 2 TABLET: 5; 325 TABLET ORAL at 12:30

## 2018-07-20 RX ADMIN — DULOXETINE 60 MG: 60 CAPSULE, DELAYED RELEASE ORAL at 17:44

## 2018-07-20 RX ADMIN — GABAPENTIN 600 MG: 300 CAPSULE ORAL at 12:30

## 2018-07-20 RX ADMIN — FAMOTIDINE 20 MG: 20 TABLET ORAL at 20:06

## 2018-07-20 RX ADMIN — ONDANSETRON 4 MG: 2 INJECTION INTRAMUSCULAR; INTRAVENOUS at 06:37

## 2018-07-20 RX ADMIN — DEXAMETHASONE SODIUM PHOSPHATE 10 MG: 10 INJECTION INTRAMUSCULAR; INTRAVENOUS at 06:37

## 2018-07-20 RX ADMIN — DOCUSATE SODIUM 100 MG: 100 CAPSULE, LIQUID FILLED ORAL at 20:06

## 2018-07-20 RX ADMIN — Medication 10 ML: at 10:23

## 2018-07-20 RX ADMIN — NEOSTIGMINE METHYLSULFATE 3 MG: 0.5 INJECTION INTRAVENOUS at 07:57

## 2018-07-20 RX ADMIN — CEFAZOLIN 2000 MG: 330 INJECTION, POWDER, FOR SOLUTION INTRAMUSCULAR; INTRAVENOUS at 06:40

## 2018-07-20 RX ADMIN — FENTANYL CITRATE 100 MCG: 50 INJECTION, SOLUTION INTRAMUSCULAR; INTRAVENOUS at 06:37

## 2018-07-20 RX ADMIN — SODIUM CHLORIDE: 9 INJECTION, SOLUTION INTRAVENOUS at 06:46

## 2018-07-20 RX ADMIN — LABETALOL HYDROCHLORIDE 2.5 MG: 5 INJECTION, SOLUTION INTRAVENOUS at 07:17

## 2018-07-20 RX ADMIN — PROPOFOL 200 MG: 10 INJECTION, EMULSION INTRAVENOUS at 06:37

## 2018-07-20 ASSESSMENT — PULMONARY FUNCTION TESTS
PIF_VALUE: 27
PIF_VALUE: 25
PIF_VALUE: 27
PIF_VALUE: 25
PIF_VALUE: 26
PIF_VALUE: 23
PIF_VALUE: 27
PIF_VALUE: 3
PIF_VALUE: 24
PIF_VALUE: 27
PIF_VALUE: 29
PIF_VALUE: 1
PIF_VALUE: 1
PIF_VALUE: 27
PIF_VALUE: 24
PIF_VALUE: 26
PIF_VALUE: 24
PIF_VALUE: 26
PIF_VALUE: 26
PIF_VALUE: 25
PIF_VALUE: 27
PIF_VALUE: 26
PIF_VALUE: 26
PIF_VALUE: 27
PIF_VALUE: 25
PIF_VALUE: 2
PIF_VALUE: 26
PIF_VALUE: 27
PIF_VALUE: 26
PIF_VALUE: 26
PIF_VALUE: 18
PIF_VALUE: 1
PIF_VALUE: 24
PIF_VALUE: 25
PIF_VALUE: 2
PIF_VALUE: 26
PIF_VALUE: 25
PIF_VALUE: 2
PIF_VALUE: 25
PIF_VALUE: 23
PIF_VALUE: 28
PIF_VALUE: 19
PIF_VALUE: 26
PIF_VALUE: 26
PIF_VALUE: 25
PIF_VALUE: 0
PIF_VALUE: 25
PIF_VALUE: 27
PIF_VALUE: 28
PIF_VALUE: 25
PIF_VALUE: 26
PIF_VALUE: 22
PIF_VALUE: 25
PIF_VALUE: 26
PIF_VALUE: 41
PIF_VALUE: 25
PIF_VALUE: 25
PIF_VALUE: 26
PIF_VALUE: 22
PIF_VALUE: 25
PIF_VALUE: 22
PIF_VALUE: 24
PIF_VALUE: 26
PIF_VALUE: 27
PIF_VALUE: 26
PIF_VALUE: 18
PIF_VALUE: 25
PIF_VALUE: 26
PIF_VALUE: 0
PIF_VALUE: 26
PIF_VALUE: 25
PIF_VALUE: 25
PIF_VALUE: 16
PIF_VALUE: 23
PIF_VALUE: 4
PIF_VALUE: 26
PIF_VALUE: 27
PIF_VALUE: 27
PIF_VALUE: 25
PIF_VALUE: 27

## 2018-07-20 ASSESSMENT — PAIN SCALES - GENERAL
PAINLEVEL_OUTOF10: 8
PAINLEVEL_OUTOF10: 9
PAINLEVEL_OUTOF10: 10
PAINLEVEL_OUTOF10: 8
PAINLEVEL_OUTOF10: 9
PAINLEVEL_OUTOF10: 8
PAINLEVEL_OUTOF10: 8
PAINLEVEL_OUTOF10: 9
PAINLEVEL_OUTOF10: 9
PAINLEVEL_OUTOF10: 8

## 2018-07-20 ASSESSMENT — PAIN DESCRIPTION - LOCATION
LOCATION: NECK
LOCATION: NECK;THROAT
LOCATION: NECK

## 2018-07-20 ASSESSMENT — PAIN DESCRIPTION - PAIN TYPE
TYPE: SURGICAL PAIN

## 2018-07-20 ASSESSMENT — PAIN - FUNCTIONAL ASSESSMENT: PAIN_FUNCTIONAL_ASSESSMENT: 0-10

## 2018-07-20 ASSESSMENT — PAIN DESCRIPTION - ORIENTATION
ORIENTATION: POSTERIOR;ANTERIOR
ORIENTATION: POSTERIOR;LEFT;RIGHT
ORIENTATION: POSTERIOR;RIGHT;LEFT
ORIENTATION: POSTERIOR;LEFT;RIGHT

## 2018-07-20 ASSESSMENT — PAIN DESCRIPTION - DESCRIPTORS
DESCRIPTORS: ACHING;SHARP;THROBBING
DESCRIPTORS: DISCOMFORT
DESCRIPTORS: ACHING;THROBBING;STABBING
DESCRIPTORS: DISCOMFORT;ACHING
DESCRIPTORS: ACHING;DISCOMFORT
DESCRIPTORS: ACHING;CONSTANT;THROBBING

## 2018-07-20 ASSESSMENT — PAIN DESCRIPTION - PROGRESSION: CLINICAL_PROGRESSION: GRADUALLY IMPROVING

## 2018-07-20 NOTE — OP NOTE
copiously with antibiotic impregnated saline. I then  proceeded to then close the wound in layers using 2-0 Vicryl for the  platysma, 3-0 Vicryl for the subcutaneous layer, and 4-0 Monocryl in  subcuticular fashion for the skin. Dermabond was applied to the skin  surface. A dry sterile dressing was placed over this. The patient was  then subsequently extubated and transported to the postanesthesia care unit  in stable condition. There were no complications. Counts were correct. I  was present for the entire case. Please note Laura Boateng PA-C, was the only qualified assistant. She  assisted with primary exposure and primary closure.         Devan Camargo MD    D: 07/20/2018 10:27:33       T: 07/20/2018 11:08:39     [email or web address]  Job#: [id number]     Doc#: [id number]    CC:

## 2018-07-20 NOTE — BRIEF OP NOTE
Brief Postoperative Note  ______________________________________________________________    Patient: Alec Griggs  YOB: 1980  MRN: [id number]  Date of Procedure: 7/20/2018    Pre-Op Diagnosis: HERNIATED DISC    Post-Op Diagnosis: Same       Procedure(s):  ANTERIOR CERVICAL DISCECTOMY AND FUSION  C5-C6  WITH PLATES, SCREWS, ONE C-ARM, REGULAR BED WITH HORSE SHOE - [employer] -- BOP    Anesthesia: General    Surgeon(s):  Adele Ramon MD    Staff:  Roberto Scrub: Moses Dumont  Scrub Person First: Janet Alvarez  Physician Assistant: PIOTR Malcolm     Estimated Blood Loss: * No values recorded between 7/20/2018  6:32 AM and 7/20/2018  4:46 AM * mL    Complications: None    Specimens:   ID Type Source Tests Collected by Time Destination   A : CERVICAL DISC AND BONE Tissue Spine SURGICAL PATHOLOGY Adele Ramon MD 7/20/2018 1518        Implants:    Implant Name Type Inv.  Item Serial No.  Lot No. LRB No. Used   GRAFT SPINE CORTCL NO SZ 7 [id number] - [id number] Bone/Graft/Tissue GRAFT SPINE CORTCL NO SZ 7 [id number] [facility] [id number] N/A 1         Drains:      Findings: see dictated op note    Bozena Ball MD  Date: 7/20/2018  Time: 7:51 AM

## 2018-07-20 NOTE — PROGRESS NOTES
SBA Educated on modified techniques (donning of pants)   Toileting  indep    Bed Mobility  Supine to Sit: min A  Sit to Supine:NT Log roll technique   Functional Transfers SBA  Cuing on hand placement, body mechanics and safety    Functional Mobility SBA Therapist facilitated functional ambulation without AD - cuing on posture, visual scanning due to restrictions with neck mobility and safety      Sit balance: indep  Stand balance: SBA  Endurance/Activity tolerance: F                              Comments: Upon arrival pt supine in bed and agreeable to OT session. At end of session pt seated in chair with all devices within reach, all lines intact     Treatment: Therapist educated pt on role of OT and cervical precautions. Therapsit facilitated bed mobility (log roll technique), functional transfers (various surfaces), standing tolerance tasks (addressing posture, balance and activity tolerance) and functional ambulation task without AD (cuing on posture and safety) - skilled cuing on hand placement, posture, body mechanics and safety. Therapist facilitated self-care retraining: LB self-care tasks (pants), toileting task and standing grooming task at sink while educating pt on modified techniques, posture, safety and energy conservation techniques. Skilled monitoring of HR, O2 sats and pts response to treatment.   Pt demonstrating good understanding of education/techniques, requiring additional training / education     Assessment of current deficits   Functional mobility [x]  ROM [] Strength [x]  Cognition []  ADLs [x]   IADLs [x] Safety Awareness [x] Endurance [x]  Fine Motor Coordination [] Balance [x] Vision/perception [] Sensation []   Gross Motor Coordination []     Eval Complexity: low  Profile and History- low  Assessment of Occupational Performance and Identification of Deficits- low  Clinical Decision Making- low    Treatment frequency: prn     Plan of Care:  ADL retraining [x]   Equipment needs []   Neuromuscular re-education [] Energy Conservation Techniques [x]  Functional Transfer training [x] Patient and/or Family Education [x]  Functional Mobility training [x]  Environmental Modifications []  Cognitive re-training []   Compensatory techniques for ADLs [x]  Splinting Needs []   Positioning to improve overall function []  Other: []      Rehab Potential: good    Patient / Family Goal: return home    Short term goals  Time Frame: 3-5 day  STG 1 :  Pt will demonstrate good understanding of cervical precautions impacting ADL completion. STG 2 :  Pt will complete LB self-care tasks with mod I   STG 3 :  Pt will complete functional transfers with mod I  STG 4 :  Pt will complete functional ambulation / item retrieval task with mod I   Pt participated in establishment of the following goals     Patient and/or family understands diagnosis, prognosis and plan of care: yes    Pt educated on cervical precautions, safety and OT POC. Patient  verbalized good understanding of education, requires additional education     [] Malnutrition indicators have been identified and nursing has been notified to ensure a dietitian consult is ordered.      AM-PAC Daily Activity Inpatient   How much help for putting on and taking off regular lower body clothing?: A Little  How much help for Bathing?: A Little  How much help for Toileting?: None  How much help for putting on and taking off regular upper body clothing?: None  How much help for taking care of personal grooming?: A Little  How much help for eating meals?: None  AM-PAC Inpatient Daily Activity Raw Score: 21  AM-PAC Inpatient ADL T-Scale Score : 44.27  ADL Inpatient CMS 0-100% Score: 32.79  ADL Inpatient CMS G-Code Modifier : CJ     low Evaluation completed +  Timed Treatment: 10 minutes  Tx Time in: 1318  TxTime out: [address] [facility] OTR/L #[id number]

## 2018-07-21 VITALS
SYSTOLIC BLOOD PRESSURE: 122 MMHG | HEIGHT: 71 IN | RESPIRATION RATE: 18 BRPM | OXYGEN SATURATION: 92 % | DIASTOLIC BLOOD PRESSURE: 73 MMHG | TEMPERATURE: 97.9 F | HEART RATE: 68 BPM | WEIGHT: 193 LBS | BODY MASS INDEX: 27.02 KG/M2

## 2018-07-21 PROCEDURE — 6360000002 HC RX W HCPCS: Performed by: NEUROLOGICAL SURGERY

## 2018-07-21 PROCEDURE — 6370000000 HC RX 637 (ALT 250 FOR IP): Performed by: NEUROLOGICAL SURGERY

## 2018-07-21 PROCEDURE — 97161 PT EVAL LOW COMPLEX 20 MIN: CPT

## 2018-07-21 PROCEDURE — G8979 MOBILITY GOAL STATUS: HCPCS

## 2018-07-21 PROCEDURE — 2580000003 HC RX 258: Performed by: NEUROLOGICAL SURGERY

## 2018-07-21 PROCEDURE — G8978 MOBILITY CURRENT STATUS: HCPCS

## 2018-07-21 PROCEDURE — 99024 POSTOP FOLLOW-UP VISIT: CPT | Performed by: PHYSICIAN ASSISTANT

## 2018-07-21 RX ORDER — HYDROCODONE BITARTRATE AND ACETAMINOPHEN 5; 325 MG/1; MG/1
2 TABLET ORAL EVERY 4 HOURS PRN
Qty: 84 TABLET | Refills: 0 | Status: SHIPPED | OUTPATIENT
Start: 2018-07-21 | End: 2018-07-27 | Stop reason: SDUPTHER

## 2018-07-21 RX ORDER — CYCLOBENZAPRINE HCL 10 MG
10 TABLET ORAL 3 TIMES DAILY PRN
Qty: 21 TABLET | Refills: 0 | Status: SHIPPED | OUTPATIENT
Start: 2018-07-21 | End: 2018-08-16 | Stop reason: SDUPTHER

## 2018-07-21 RX ORDER — GABAPENTIN 300 MG/1
600 CAPSULE ORAL 3 TIMES DAILY
Qty: 42 CAPSULE | Refills: 0 | Status: SHIPPED | OUTPATIENT
Start: 2018-07-21 | End: 2018-11-28

## 2018-07-21 RX ADMIN — FAMOTIDINE 20 MG: 20 TABLET ORAL at 08:37

## 2018-07-21 RX ADMIN — DEXTROSE MONOHYDRATE 2 G: 50 INJECTION, SOLUTION INTRAVENOUS at 06:31

## 2018-07-21 RX ADMIN — CYCLOBENZAPRINE 10 MG: 10 TABLET, FILM COATED ORAL at 13:01

## 2018-07-21 RX ADMIN — Medication 10 ML: at 04:54

## 2018-07-21 RX ADMIN — GABAPENTIN 600 MG: 300 CAPSULE ORAL at 08:36

## 2018-07-21 RX ADMIN — HYDROCODONE BITARTRATE AND ACETAMINOPHEN 2 TABLET: 5; 325 TABLET ORAL at 13:49

## 2018-07-21 RX ADMIN — CYCLOBENZAPRINE 10 MG: 10 TABLET, FILM COATED ORAL at 04:55

## 2018-07-21 RX ADMIN — Medication 10 ML: at 06:31

## 2018-07-21 RX ADMIN — MORPHINE SULFATE 2 MG: 2 INJECTION, SOLUTION INTRAMUSCULAR; INTRAVENOUS at 06:31

## 2018-07-21 RX ADMIN — DOCUSATE SODIUM 100 MG: 100 CAPSULE, LIQUID FILLED ORAL at 08:37

## 2018-07-21 RX ADMIN — Medication 10 ML: at 08:41

## 2018-07-21 RX ADMIN — HYDROCODONE BITARTRATE AND ACETAMINOPHEN 2 TABLET: 5; 325 TABLET ORAL at 09:40

## 2018-07-21 RX ADMIN — GABAPENTIN 600 MG: 300 CAPSULE ORAL at 13:51

## 2018-07-21 RX ADMIN — HYDROCODONE BITARTRATE AND ACETAMINOPHEN 2 TABLET: 5; 325 TABLET ORAL at 04:55

## 2018-07-21 RX ADMIN — DEXAMETHASONE SODIUM PHOSPHATE 4 MG: 4 INJECTION, SOLUTION INTRA-ARTICULAR; INTRALESIONAL; INTRAMUSCULAR; INTRAVENOUS; SOFT TISSUE at 04:56

## 2018-07-21 RX ADMIN — MORPHINE SULFATE 4 MG: 4 INJECTION INTRAVENOUS at 08:41

## 2018-07-21 ASSESSMENT — PAIN DESCRIPTION - ONSET
ONSET: ON-GOING
ONSET: ON-GOING

## 2018-07-21 ASSESSMENT — PAIN DESCRIPTION - LOCATION
LOCATION: NECK;THROAT
LOCATION: NECK
LOCATION: NECK;THROAT
LOCATION: NECK
LOCATION: NECK

## 2018-07-21 ASSESSMENT — PAIN DESCRIPTION - FREQUENCY
FREQUENCY: CONTINUOUS
FREQUENCY: CONTINUOUS

## 2018-07-21 ASSESSMENT — PAIN SCALES - GENERAL
PAINLEVEL_OUTOF10: 5
PAINLEVEL_OUTOF10: 8
PAINLEVEL_OUTOF10: 4
PAINLEVEL_OUTOF10: 6
PAINLEVEL_OUTOF10: 4
PAINLEVEL_OUTOF10: 7
PAINLEVEL_OUTOF10: 6
PAINLEVEL_OUTOF10: 7
PAINLEVEL_OUTOF10: 7
PAINLEVEL_OUTOF10: 8

## 2018-07-21 ASSESSMENT — PAIN DESCRIPTION - PROGRESSION
CLINICAL_PROGRESSION: GRADUALLY IMPROVING
CLINICAL_PROGRESSION: NOT CHANGED

## 2018-07-21 ASSESSMENT — PAIN DESCRIPTION - DESCRIPTORS
DESCRIPTORS: ACHING;CONSTANT;DISCOMFORT
DESCRIPTORS: SHARP;THROBBING
DESCRIPTORS: ACHING;CONSTANT;THROBBING
DESCRIPTORS: SHARP;THROBBING
DESCRIPTORS: SHARP;THROBBING

## 2018-07-21 ASSESSMENT — PAIN DESCRIPTION - ORIENTATION
ORIENTATION: ANTERIOR
ORIENTATION: ANTERIOR
ORIENTATION: POSTERIOR
ORIENTATION: ANTERIOR
ORIENTATION: ANTERIOR

## 2018-07-21 ASSESSMENT — PAIN DESCRIPTION - PAIN TYPE
TYPE: SURGICAL PAIN

## 2018-07-21 NOTE — PROGRESS NOTES
Physical Therapy    Facility/Department: [address] NEURO SPINE  Initial Assessment    NAME: Miguel Blair  : 1980  MRN: [id number]    Date of Service: 2018  Evaluating Therapist: Ragini Lai PT    Room #: 2612-M  DIAGNOSIS: Herniated disc  PRECAUTIONS: Cervical spine precautions  PROCEDURE(S):  C5/C6 ACDF    Social:  Pt lives with his family in a 3 floor plan 4 or 9 steps and no rail to enter with a flight of steps between each floor with 1 rail (bedroom on 3rd floor, bathroom on 2nd floor)  Prior to admission pt walked with no AD. Initial Evaluation  Date: 18   Was pt agreeable to Eval/treatment? Yes   Does pt have pain? 7/10 neck pain   Bed Mobility  Rolling: Independent  Supine to sit: Modified Independent  Sit to supine: NT  Scooting: Independent   Transfers Sit to stand: Independent  Stand to sit: Independent  Stand pivot: Independent   Ambulation   150 feet with no AD with [place]   Stair negotiation: ascended and descended 20 steps with no rail with [place]   AM-PAC Raw Score 24/24     Pt is alert and Oriented x4  BLE ROM is WNL. BLE strength is grossly 5/5. Seated Balance: Independent  Standing Balance: Independent  Sensation: Denied numbness/tingling  Edema: None  Endurance: Good  Skin was inspected: BLEs appear intact    ASSESSMENT  Pt displays functional ability as noted in the objective portion of this evaluation. Comments/Treatment:  The pt was able to get OOB with no assistance, he followed precautions well throughout the evaluation and used a log roll technique to get to a sitting position with no VCing after his cervical spine precautions were reviewed with him. The pt ambulated with good gait speed and reciprocal step pattern, no unsteadiness with stair negotiation, he descended with non-reciprocal pattern that became a reciprocal pattern with no rail, he was left sitting EOB with his wife present and call light in reach.        PLAN  Pt will be discharged from PT services at this time. If there is any change in the pt's status please don't hesitate to re-consult. Thank you. Time in: 1140  Time out: 5[address] [facility]  Rosemary Emily, [facility]  number:  PT [phone]

## 2018-07-21 NOTE — CARE COORDINATION
Social Work/Discharge Planning    Weekend SW received call regarding [facility] and the [facility] . Per RN , pt has no preference of [place] [address] agency- agreeable to [facility]. Referral made to [address] [place] [address]. SW following, pt can discharge home when medically stable.     Electronically signed by EVELIN Crenshaw on 7/21/2018 at 12:34 PM

## 2018-07-25 ENCOUNTER — OFFICE VISIT (OUTPATIENT)
Dept: NEUROSURGERY | Age: 38
End: 2018-07-25

## 2018-07-25 ENCOUNTER — TELEPHONE (OUTPATIENT)
Dept: FAMILY MEDICINE CLINIC | Age: 38
End: 2018-07-25

## 2018-07-25 VITALS
BODY MASS INDEX: 27.58 KG/M2 | DIASTOLIC BLOOD PRESSURE: 110 MMHG | HEART RATE: 76 BPM | HEIGHT: 71 IN | SYSTOLIC BLOOD PRESSURE: 141 MMHG | WEIGHT: 197 LBS

## 2018-07-25 DIAGNOSIS — M54.2 NECK PAIN: ICD-10-CM

## 2018-07-25 DIAGNOSIS — I10 ESSENTIAL HYPERTENSION: Primary | ICD-10-CM

## 2018-07-25 DIAGNOSIS — T14.8XXA HEMATOMA: Primary | ICD-10-CM

## 2018-07-25 PROCEDURE — 99024 POSTOP FOLLOW-UP VISIT: CPT | Performed by: PHYSICIAN ASSISTANT

## 2018-07-25 RX ORDER — AMLODIPINE BESYLATE 5 MG/1
5 TABLET ORAL DAILY
Qty: 30 TABLET | Refills: 3 | Status: SHIPPED | OUTPATIENT
Start: 2018-07-25 | End: 2018-11-28 | Stop reason: SDUPTHER

## 2018-07-25 NOTE — TELEPHONE ENCOUNTER
Lincoln Freeman from [facility] called to say that pt's bp has been elevated. He recently had a lumbar fusion by Dr. Jaylin Shoemaker. His BP in his office was 130/110 and had not been addressed. Lincoln Pete took /102. Pt stopped (on his own) taking lisinopril and amlodipine a couple of months ago. Please advise. Pharmacy is [address] [place] [address].

## 2018-07-25 NOTE — TELEPHONE ENCOUNTER
Attempted to call twice. Left message called - he should not have stopped his medications. Noted he has not been refilling any of his HTN meds regularly since last year. Rx for amlodipine 5 mg once daily to start again. Sent to [facility].

## 2018-07-26 PROBLEM — M54.2 NECK PAIN: Status: ACTIVE | Noted: 2018-07-26

## 2018-07-26 NOTE — PROGRESS NOTES
Wound Check    Patient c/o \"blister\" on wound. Admits to drainage that has resolved. No true dehiscence. No fevers, edema, erythema, or SOB. Wound clean and dry without dehiscence, no evidence of drainage or infection. Superficial blister not identified. Continue current care and routine follow-up.

## 2018-07-26 NOTE — TELEPHONE ENCOUNTER
Voicemail left for patient to return our call at their earliest convenience. VM left on wifeAudrey's number/she is on the HIPAA form.

## 2018-07-27 ENCOUNTER — TELEPHONE (OUTPATIENT)
Dept: NEUROSURGERY | Age: 38
End: 2018-07-27

## 2018-07-27 DIAGNOSIS — M50.20 CERVICAL HERNIATED DISC: ICD-10-CM

## 2018-07-27 RX ORDER — HYDROCODONE BITARTRATE AND ACETAMINOPHEN 5; 325 MG/1; MG/1
2 TABLET ORAL EVERY 4 HOURS PRN
Qty: 84 TABLET | Refills: 0 | Status: SHIPPED | OUTPATIENT
Start: 2018-07-28 | End: 2018-08-04

## 2018-07-27 NOTE — TELEPHONE ENCOUNTER
Patient would like a refill for Norco 5-325mg sent to [address] [place] [address] in [facility]

## 2018-07-30 ENCOUNTER — TELEPHONE (OUTPATIENT)
Dept: NEUROSURGERY | Age: 38
End: 2018-07-30

## 2018-07-30 DIAGNOSIS — M54.2 NECK PAIN OF OVER 3 MONTHS DURATION: Primary | ICD-10-CM

## 2018-07-30 RX ORDER — OXYCODONE HYDROCHLORIDE AND ACETAMINOPHEN 5; 325 MG/1; MG/1
1 TABLET ORAL EVERY 4 HOURS PRN
Qty: 60 TABLET | Refills: 0 | Status: SHIPPED | OUTPATIENT
Start: 2018-07-30 | End: 2018-08-07 | Stop reason: SDUPTHER

## 2018-07-30 NOTE — TELEPHONE ENCOUNTER
Pt called in stating he can not keep anything down and is throwing up.  He had surgery on 7/20    [phone]

## 2018-08-06 DIAGNOSIS — M54.2 NECK PAIN OF OVER 3 MONTHS DURATION: ICD-10-CM

## 2018-08-06 NOTE — TELEPHONE ENCOUNTER
Pt called in for refill on oxyCODONE-acetaminophen (PERCOCET) 5-325 MG    Pharmacy:  [facility], [address] [phone] - F [phone]

## 2018-08-07 RX ORDER — OXYCODONE HYDROCHLORIDE AND ACETAMINOPHEN 5; 325 MG/1; MG/1
1 TABLET ORAL EVERY 4 HOURS PRN
Qty: 42 TABLET | Refills: 0 | Status: SHIPPED | OUTPATIENT
Start: 2018-08-07 | End: 2018-08-16 | Stop reason: SDUPTHER

## 2018-08-16 ENCOUNTER — HOSPITAL ENCOUNTER (OUTPATIENT)
Dept: GENERAL RADIOLOGY | Age: 38
Discharge: HOME | End: 2018-08-18
Payer: COMMERCIAL

## 2018-08-16 ENCOUNTER — OFFICE VISIT (OUTPATIENT)
Dept: NEUROSURGERY | Age: 38
End: 2018-08-16

## 2018-08-16 ENCOUNTER — HOSPITAL ENCOUNTER (OUTPATIENT)
Age: 38
Discharge: HOME | End: 2018-08-18
Payer: COMMERCIAL

## 2018-08-16 VITALS
BODY MASS INDEX: 26.88 KG/M2 | WEIGHT: 192 LBS | HEART RATE: 79 BPM | SYSTOLIC BLOOD PRESSURE: 138 MMHG | DIASTOLIC BLOOD PRESSURE: 103 MMHG | HEIGHT: 71 IN

## 2018-08-16 DIAGNOSIS — M54.2 NECK PAIN OF OVER 3 MONTHS DURATION: ICD-10-CM

## 2018-08-16 DIAGNOSIS — M50.20 CERVICAL HERNIATED DISC: ICD-10-CM

## 2018-08-16 PROCEDURE — 99024 POSTOP FOLLOW-UP VISIT: CPT | Performed by: PHYSICIAN ASSISTANT

## 2018-08-16 PROCEDURE — 72040 X-RAY EXAM NECK SPINE 2-3 VW: CPT

## 2018-08-16 RX ORDER — CYCLOBENZAPRINE HCL 10 MG
10 TABLET ORAL 3 TIMES DAILY PRN
Qty: 21 TABLET | Refills: 0 | Status: SHIPPED | OUTPATIENT
Start: 2018-08-16 | End: 2018-08-22 | Stop reason: SDUPTHER

## 2018-08-16 RX ORDER — OXYCODONE HYDROCHLORIDE AND ACETAMINOPHEN 5; 325 MG/1; MG/1
1 TABLET ORAL EVERY 4 HOURS PRN
Qty: 42 TABLET | Refills: 0 | Status: SHIPPED | OUTPATIENT
Start: 2018-08-16 | End: 2018-08-22 | Stop reason: SDUPTHER

## 2018-08-16 NOTE — PROGRESS NOTES
Post Operative Follow-up    Patient is status post: cervical fusion. Still with neck and left shoulder pain. He continues to smoke    Physical Exam  Alert and Oriented X 3  PERRLA, EOMI  BERMUDEZ 5/5  Wound: C/D/I    A/P: patient is s/p C5-6 ACDF. X-rays stable. F/u in 2 months with x-rays. Continue all restrictions.   Informed to quit smoking again to promote good healing

## 2018-08-21 ENCOUNTER — TELEPHONE (OUTPATIENT)
Dept: NEUROSURGERY | Age: 38
End: 2018-08-21

## 2018-08-21 DIAGNOSIS — M54.2 NECK PAIN OF OVER 3 MONTHS DURATION: ICD-10-CM

## 2018-08-21 DIAGNOSIS — M50.20 CERVICAL HERNIATED DISC: ICD-10-CM

## 2018-08-21 NOTE — TELEPHONE ENCOUNTER
Patient requesting a refill on the Percocet and Flexerill   - please send to the [facility] in [facility].

## 2018-08-22 RX ORDER — CYCLOBENZAPRINE HCL 10 MG
10 TABLET ORAL 3 TIMES DAILY PRN
Qty: 21 TABLET | Refills: 2 | Status: SHIPPED | OUTPATIENT
Start: 2018-08-22 | End: 2018-09-10 | Stop reason: SDUPTHER

## 2018-08-22 RX ORDER — OXYCODONE HYDROCHLORIDE AND ACETAMINOPHEN 5; 325 MG/1; MG/1
1 TABLET ORAL EVERY 4 HOURS PRN
Qty: 42 TABLET | Refills: 0 | Status: SHIPPED | OUTPATIENT
Start: 2018-08-22 | End: 2018-08-27 | Stop reason: SDUPTHER

## 2018-08-27 DIAGNOSIS — M54.2 NECK PAIN OF OVER 3 MONTHS DURATION: ICD-10-CM

## 2018-08-27 DIAGNOSIS — M50.20 CERVICAL HERNIATED DISC: ICD-10-CM

## 2018-08-27 RX ORDER — OXYCODONE HYDROCHLORIDE AND ACETAMINOPHEN 5; 325 MG/1; MG/1
1 TABLET ORAL EVERY 4 HOURS PRN
Qty: 42 TABLET | Refills: 0 | Status: SHIPPED | OUTPATIENT
Start: 2018-08-27 | End: 2018-09-04 | Stop reason: SDUPTHER

## 2018-09-04 ENCOUNTER — TELEPHONE (OUTPATIENT)
Dept: NEUROSURGERY | Age: 38
End: 2018-09-04

## 2018-09-04 DIAGNOSIS — M54.2 NECK PAIN OF OVER 3 MONTHS DURATION: ICD-10-CM

## 2018-09-04 DIAGNOSIS — M50.20 CERVICAL HERNIATED DISC: ICD-10-CM

## 2018-09-04 RX ORDER — OXYCODONE HYDROCHLORIDE AND ACETAMINOPHEN 5; 325 MG/1; MG/1
1 TABLET ORAL EVERY 4 HOURS PRN
Qty: 42 TABLET | Refills: 0 | Status: SHIPPED | OUTPATIENT
Start: 2018-09-04 | End: 2018-09-10 | Stop reason: SDUPTHER

## 2018-09-10 ENCOUNTER — TELEPHONE (OUTPATIENT)
Dept: NEUROSURGERY | Age: 38
End: 2018-09-10

## 2018-09-10 DIAGNOSIS — M54.2 NECK PAIN OF OVER 3 MONTHS DURATION: ICD-10-CM

## 2018-09-10 DIAGNOSIS — M50.20 CERVICAL HERNIATED DISC: ICD-10-CM

## 2018-09-10 RX ORDER — CYCLOBENZAPRINE HCL 10 MG
10 TABLET ORAL 3 TIMES DAILY PRN
Qty: 21 TABLET | Refills: 2 | Status: SHIPPED | OUTPATIENT
Start: 2018-09-10 | End: 2018-10-19 | Stop reason: SDUPTHER

## 2018-09-10 RX ORDER — OXYCODONE HYDROCHLORIDE AND ACETAMINOPHEN 5; 325 MG/1; MG/1
1 TABLET ORAL EVERY 4 HOURS PRN
Qty: 42 TABLET | Refills: 0 | Status: SHIPPED | OUTPATIENT
Start: 2018-09-10 | End: 2018-09-17 | Stop reason: SDUPTHER

## 2018-09-17 ENCOUNTER — TELEPHONE (OUTPATIENT)
Dept: NEUROSURGERY | Age: 38
End: 2018-09-17

## 2018-09-17 DIAGNOSIS — M50.20 CERVICAL HERNIATED DISC: ICD-10-CM

## 2018-09-17 DIAGNOSIS — M54.2 NECK PAIN OF OVER 3 MONTHS DURATION: ICD-10-CM

## 2018-09-17 RX ORDER — OXYCODONE HYDROCHLORIDE AND ACETAMINOPHEN 5; 325 MG/1; MG/1
1 TABLET ORAL EVERY 4 HOURS PRN
Qty: 42 TABLET | Refills: 0 | Status: SHIPPED | OUTPATIENT
Start: 2018-09-17 | End: 2018-09-24 | Stop reason: SDUPTHER

## 2018-09-24 ENCOUNTER — TELEPHONE (OUTPATIENT)
Dept: NEUROSURGERY | Age: 38
End: 2018-09-24

## 2018-09-24 DIAGNOSIS — M54.2 NECK PAIN OF OVER 3 MONTHS DURATION: ICD-10-CM

## 2018-09-24 DIAGNOSIS — M50.20 CERVICAL HERNIATED DISC: ICD-10-CM

## 2018-09-24 RX ORDER — OXYCODONE HYDROCHLORIDE AND ACETAMINOPHEN 5; 325 MG/1; MG/1
1 TABLET ORAL EVERY 4 HOURS PRN
Qty: 42 TABLET | Refills: 0 | Status: SHIPPED | OUTPATIENT
Start: 2018-09-24 | End: 2018-10-01 | Stop reason: SDUPTHER

## 2018-09-25 DIAGNOSIS — M54.2 NECK PAIN: Primary | ICD-10-CM

## 2018-09-25 DIAGNOSIS — M50.20 CERVICAL HERNIATED DISC: ICD-10-CM

## 2018-09-25 NOTE — TELEPHONE ENCOUNTER
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 He was advised by the counseling center to give our office a call for refills. Pt does have an upcoming appt on 10/29/18.   Dosages verified:  Adderall XR 30mg  1 qam  Gabapentin 600mg 1 tid  lamictal 100mg 1qhs  cymbalta 60mg 1 qhs.

## 2018-09-26 RX ORDER — LAMOTRIGINE 100 MG/1
100 TABLET ORAL NIGHTLY
Qty: 30 TABLET | Refills: 3 | OUTPATIENT
Start: 2018-09-26

## 2018-09-26 RX ORDER — DULOXETIN HYDROCHLORIDE 60 MG/1
60 CAPSULE, DELAYED RELEASE ORAL EVERY EVENING
Qty: 30 CAPSULE | OUTPATIENT
Start: 2018-09-26

## 2018-09-26 RX ORDER — DEXTROAMPHETAMINE SACCHARATE, AMPHETAMINE ASPARTATE MONOHYDRATE, DEXTROAMPHETAMINE SULFATE AND AMPHETAMINE SULFATE 5; 5; 5; 5 MG/1; MG/1; MG/1; MG/1
30 CAPSULE, EXTENDED RELEASE ORAL EVERY MORNING
Qty: 30 CAPSULE | OUTPATIENT
Start: 2018-09-26

## 2018-09-26 RX ORDER — GABAPENTIN 300 MG/1
600 CAPSULE ORAL 3 TIMES DAILY
Qty: 42 CAPSULE | Refills: 0 | OUTPATIENT
Start: 2018-09-26 | End: 2018-10-03

## 2018-10-01 DIAGNOSIS — M54.2 NECK PAIN OF OVER 3 MONTHS DURATION: ICD-10-CM

## 2018-10-01 DIAGNOSIS — M50.20 CERVICAL HERNIATED DISC: ICD-10-CM

## 2018-10-01 RX ORDER — OXYCODONE HYDROCHLORIDE AND ACETAMINOPHEN 5; 325 MG/1; MG/1
1 TABLET ORAL EVERY 4 HOURS PRN
Qty: 42 TABLET | Refills: 0 | Status: SHIPPED | OUTPATIENT
Start: 2018-10-01 | End: 2018-10-05 | Stop reason: SDUPTHER

## 2018-10-04 ENCOUNTER — HOSPITAL ENCOUNTER (OUTPATIENT)
Age: 38
Discharge: HOME | End: 2018-10-06
Payer: COMMERCIAL

## 2018-10-04 ENCOUNTER — OFFICE VISIT (OUTPATIENT)
Dept: NEUROSURGERY | Age: 38
End: 2018-10-04

## 2018-10-04 ENCOUNTER — HOSPITAL ENCOUNTER (OUTPATIENT)
Dept: GENERAL RADIOLOGY | Age: 38
Discharge: HOME | End: 2018-10-06
Payer: COMMERCIAL

## 2018-10-04 VITALS
DIASTOLIC BLOOD PRESSURE: 105 MMHG | BODY MASS INDEX: 27.02 KG/M2 | SYSTOLIC BLOOD PRESSURE: 142 MMHG | WEIGHT: 193 LBS | HEIGHT: 71 IN | HEART RATE: 69 BPM

## 2018-10-04 DIAGNOSIS — M50.90 CERVICAL DISC DISEASE: Primary | ICD-10-CM

## 2018-10-04 DIAGNOSIS — M54.2 NECK PAIN: ICD-10-CM

## 2018-10-04 PROCEDURE — 72040 X-RAY EXAM NECK SPINE 2-3 VW: CPT

## 2018-10-04 PROCEDURE — 99024 POSTOP FOLLOW-UP VISIT: CPT | Performed by: PHYSICIAN ASSISTANT

## 2018-10-05 ENCOUNTER — TELEPHONE (OUTPATIENT)
Dept: NEUROSURGERY | Age: 38
End: 2018-10-05

## 2018-10-05 DIAGNOSIS — M50.20 CERVICAL HERNIATED DISC: ICD-10-CM

## 2018-10-05 DIAGNOSIS — M54.2 NECK PAIN OF OVER 3 MONTHS DURATION: ICD-10-CM

## 2018-10-05 RX ORDER — OXYCODONE HYDROCHLORIDE AND ACETAMINOPHEN 5; 325 MG/1; MG/1
1 TABLET ORAL EVERY 4 HOURS PRN
Qty: 42 TABLET | Refills: 0 | Status: SHIPPED | OUTPATIENT
Start: 2018-10-05 | End: 2018-10-15 | Stop reason: SDUPTHER

## 2018-10-05 NOTE — TELEPHONE ENCOUNTER
Pt need refill on oxyCODONE-acetaminophen (PERCOCET) 5-325 MG    [facility] [address] [facility], [place] - [address] [place] [phone]

## 2018-10-15 ENCOUNTER — TELEPHONE (OUTPATIENT)
Dept: NEUROSURGERY | Age: 38
End: 2018-10-15

## 2018-10-15 DIAGNOSIS — M54.2 NECK PAIN OF OVER 3 MONTHS DURATION: ICD-10-CM

## 2018-10-15 DIAGNOSIS — M50.20 CERVICAL HERNIATED DISC: ICD-10-CM

## 2018-10-15 RX ORDER — OXYCODONE HYDROCHLORIDE AND ACETAMINOPHEN 5; 325 MG/1; MG/1
1 TABLET ORAL EVERY 4 HOURS PRN
Qty: 42 TABLET | Refills: 0 | Status: SHIPPED | OUTPATIENT
Start: 2018-10-15 | End: 2018-10-19 | Stop reason: SDUPTHER

## 2018-10-15 NOTE — TELEPHONE ENCOUNTER
Pt need refill on oxyCODONE-acetaminophen (PERCOCET) 5-325 MG     [facility] [facility], [place] - [address] [place] [phone]

## 2018-10-19 DIAGNOSIS — M54.2 NECK PAIN OF OVER 3 MONTHS DURATION: ICD-10-CM

## 2018-10-19 DIAGNOSIS — M50.20 CERVICAL HERNIATED DISC: ICD-10-CM

## 2018-10-19 RX ORDER — OXYCODONE HYDROCHLORIDE AND ACETAMINOPHEN 5; 325 MG/1; MG/1
1 TABLET ORAL EVERY 4 HOURS PRN
Qty: 42 TABLET | Refills: 0 | Status: SHIPPED | OUTPATIENT
Start: 2018-10-19 | End: 2018-10-26

## 2018-10-19 RX ORDER — CYCLOBENZAPRINE HCL 10 MG
10 TABLET ORAL 3 TIMES DAILY PRN
Qty: 21 TABLET | Refills: 2 | Status: SHIPPED | OUTPATIENT
Start: 2018-10-19 | End: 2018-10-26

## 2018-10-19 NOTE — TELEPHONE ENCOUNTER
Pt informed prescriptions refilled. Told him this is the last refill we can prescribe, so a referral to pain clinic was made.

## 2018-10-29 ENCOUNTER — TELEPHONE (OUTPATIENT)
Dept: NEUROSURGERY | Age: 38
End: 2018-10-29

## 2018-11-15 ENCOUNTER — TELEPHONE (OUTPATIENT)
Dept: FAMILY MEDICINE CLINIC | Age: 38
End: 2018-11-15

## 2018-11-15 DIAGNOSIS — G43.909 NONINTRACTABLE CHRONIC MIGRAINE: Primary | ICD-10-CM

## 2018-11-28 ENCOUNTER — HOSPITAL ENCOUNTER (OUTPATIENT)
Age: 38
Discharge: HOME | End: 2018-11-30
Payer: COMMERCIAL

## 2018-11-28 ENCOUNTER — OFFICE VISIT (OUTPATIENT)
Dept: FAMILY MEDICINE CLINIC | Age: 38
End: 2018-11-28
Payer: COMMERCIAL

## 2018-11-28 VITALS
SYSTOLIC BLOOD PRESSURE: 120 MMHG | WEIGHT: 194 LBS | OXYGEN SATURATION: 98 % | HEART RATE: 88 BPM | BODY MASS INDEX: 27.16 KG/M2 | TEMPERATURE: 98.6 F | DIASTOLIC BLOOD PRESSURE: 82 MMHG | HEIGHT: 71 IN

## 2018-11-28 DIAGNOSIS — I10 ESSENTIAL HYPERTENSION: Primary | ICD-10-CM

## 2018-11-28 DIAGNOSIS — Z72.0 TOBACCO USE: ICD-10-CM

## 2018-11-28 DIAGNOSIS — R39.14 FEELING OF INCOMPLETE BLADDER EMPTYING: ICD-10-CM

## 2018-11-28 DIAGNOSIS — R53.83 FATIGUE, UNSPECIFIED TYPE: ICD-10-CM

## 2018-11-28 DIAGNOSIS — E53.8 B12 DEFICIENCY: Primary | ICD-10-CM

## 2018-11-28 LAB
BASOPHILS ABSOLUTE: 0.05 E9/L (ref 0–0.2)
BASOPHILS RELATIVE PERCENT: 0.5 % (ref 0–2)
EOSINOPHILS ABSOLUTE: 0.39 E9/L (ref 0.05–0.5)
EOSINOPHILS RELATIVE PERCENT: 3.9 % (ref 0–6)
FOLATE: 17.9 NG/ML (ref 4.8–24.2)
HCT VFR BLD CALC: 47.7 % (ref 37–54)
HEMOGLOBIN: 15.2 G/DL (ref 12.5–16.5)
IMMATURE GRANULOCYTES #: 0.03 E9/L
IMMATURE GRANULOCYTES %: 0.3 % (ref 0–5)
LYMPHOCYTES ABSOLUTE: 3.26 E9/L (ref 1.5–4)
LYMPHOCYTES RELATIVE PERCENT: 32.2 % (ref 20–42)
MCH RBC QN AUTO: 28.6 PG (ref 26–35)
MCHC RBC AUTO-ENTMCNC: 31.9 % (ref 32–34.5)
MCV RBC AUTO: 89.8 FL (ref 80–99.9)
MONOCYTES ABSOLUTE: 0.68 E9/L (ref 0.1–0.95)
MONOCYTES RELATIVE PERCENT: 6.7 % (ref 2–12)
NEUTROPHILS ABSOLUTE: 5.7 E9/L (ref 1.8–7.3)
NEUTROPHILS RELATIVE PERCENT: 56.4 % (ref 43–80)
PDW BLD-RTO: 13.5 FL (ref 11.5–15)
PLATELET # BLD: 406 E9/L (ref 130–450)
PMV BLD AUTO: 9.8 FL (ref 7–12)
RBC # BLD: 5.31 E12/L (ref 3.8–5.8)
TSH SERPL DL<=0.05 MIU/L-ACNC: 1.6 UIU/ML (ref 0.27–4.2)
VITAMIN B-12: 273 PG/ML (ref 211–946)
WBC # BLD: 10.1 E9/L (ref 4.5–11.5)

## 2018-11-28 PROCEDURE — 99213 OFFICE O/P EST LOW 20 MIN: CPT | Performed by: FAMILY MEDICINE

## 2018-11-28 PROCEDURE — 82746 ASSAY OF FOLIC ACID SERUM: CPT

## 2018-11-28 PROCEDURE — 4004F PT TOBACCO SCREEN RCVD TLK: CPT | Performed by: FAMILY MEDICINE

## 2018-11-28 PROCEDURE — G8419 CALC BMI OUT NRM PARAM NOF/U: HCPCS | Performed by: FAMILY MEDICINE

## 2018-11-28 PROCEDURE — 84443 ASSAY THYROID STIM HORMONE: CPT

## 2018-11-28 PROCEDURE — G8484 FLU IMMUNIZE NO ADMIN: HCPCS | Performed by: FAMILY MEDICINE

## 2018-11-28 PROCEDURE — 82607 VITAMIN B-12: CPT

## 2018-11-28 PROCEDURE — 36415 COLL VENOUS BLD VENIPUNCTURE: CPT | Performed by: FAMILY MEDICINE

## 2018-11-28 PROCEDURE — 96160 PT-FOCUSED HLTH RISK ASSMT: CPT | Performed by: FAMILY MEDICINE

## 2018-11-28 PROCEDURE — G8427 DOCREV CUR MEDS BY ELIG CLIN: HCPCS | Performed by: FAMILY MEDICINE

## 2018-11-28 PROCEDURE — 85025 COMPLETE CBC W/AUTO DIFF WBC: CPT

## 2018-11-28 RX ORDER — ARIPIPRAZOLE 10 MG/1
10 TABLET ORAL NIGHTLY
COMMUNITY
End: 2020-12-29

## 2018-11-28 RX ORDER — LANOLIN ALCOHOL/MO/W.PET/CERES
1000 CREAM (GRAM) TOPICAL DAILY
Qty: 30 TABLET | Refills: 3 | Status: SHIPPED | OUTPATIENT
Start: 2018-11-28 | End: 2021-02-16

## 2018-11-28 RX ORDER — AMLODIPINE BESYLATE 5 MG/1
5 TABLET ORAL DAILY
Qty: 90 TABLET | Refills: 3 | Status: SHIPPED | OUTPATIENT
Start: 2018-11-28 | End: 2021-01-25

## 2018-11-28 ASSESSMENT — PATIENT HEALTH QUESTIONNAIRE - PHQ9
2. FEELING DOWN, DEPRESSED OR HOPELESS: 1
SUM OF ALL RESPONSES TO PHQ QUESTIONS 1-9: 19
4. FEELING TIRED OR HAVING LITTLE ENERGY: 2
7. TROUBLE CONCENTRATING ON THINGS, SUCH AS READING THE NEWSPAPER OR WATCHING TELEVISION: 3
3. TROUBLE FALLING OR STAYING ASLEEP: 2
SUM OF ALL RESPONSES TO PHQ9 QUESTIONS 1 & 2: 4
6. FEELING BAD ABOUT YOURSELF - OR THAT YOU ARE A FAILURE OR HAVE LET YOURSELF OR YOUR FAMILY DOWN: 3
9. THOUGHTS THAT YOU WOULD BE BETTER OFF DEAD, OR OF HURTING YOURSELF: 0
8. MOVING OR SPEAKING SO SLOWLY THAT OTHER PEOPLE COULD HAVE NOTICED. OR THE OPPOSITE, BEING SO FIGETY OR RESTLESS THAT YOU HAVE BEEN MOVING AROUND A LOT MORE THAN USUAL: 3
SUM OF ALL RESPONSES TO PHQ QUESTIONS 1-9: 19
10. IF YOU CHECKED OFF ANY PROBLEMS, HOW DIFFICULT HAVE THESE PROBLEMS MADE IT FOR YOU TO DO YOUR WORK, TAKE CARE OF THINGS AT HOME, OR GET ALONG WITH OTHER PEOPLE: 3
5. POOR APPETITE OR OVEREATING: 2
1. LITTLE INTEREST OR PLEASURE IN DOING THINGS: 3

## 2018-11-28 NOTE — PROGRESS NOTES
an appointment scheduled with Dr Anne Marie Ruelas for follow-up of his carpal tunnel. Pt declined any assistance with quitting tobacco at this time. Pt declined flu immunization       Sourav EUBANKS.

## 2018-12-07 ENCOUNTER — TELEPHONE (OUTPATIENT)
Dept: ORTHOPEDIC SURGERY | Age: 38
End: 2018-12-07

## 2018-12-07 DIAGNOSIS — G56.21 ULNAR NEUROPATHY AT ELBOW, RIGHT: Primary | ICD-10-CM

## 2018-12-10 ENCOUNTER — OFFICE VISIT (OUTPATIENT)
Dept: ORTHOPEDIC SURGERY | Age: 38
End: 2018-12-10
Payer: COMMERCIAL

## 2018-12-10 VITALS
SYSTOLIC BLOOD PRESSURE: 131 MMHG | TEMPERATURE: 98.1 F | DIASTOLIC BLOOD PRESSURE: 83 MMHG | RESPIRATION RATE: 18 BRPM | HEART RATE: 80 BPM

## 2018-12-10 DIAGNOSIS — G56.21 ULNAR NEUROPATHY AT ELBOW, RIGHT: Primary | ICD-10-CM

## 2018-12-10 DIAGNOSIS — G56.01 RIGHT CARPAL TUNNEL SYNDROME: ICD-10-CM

## 2018-12-10 PROCEDURE — 4004F PT TOBACCO SCREEN RCVD TLK: CPT | Performed by: ORTHOPAEDIC SURGERY

## 2018-12-10 PROCEDURE — 99214 OFFICE O/P EST MOD 30 MIN: CPT | Performed by: ORTHOPAEDIC SURGERY

## 2018-12-10 PROCEDURE — G8427 DOCREV CUR MEDS BY ELIG CLIN: HCPCS | Performed by: ORTHOPAEDIC SURGERY

## 2018-12-10 PROCEDURE — G8419 CALC BMI OUT NRM PARAM NOF/U: HCPCS | Performed by: ORTHOPAEDIC SURGERY

## 2018-12-10 PROCEDURE — G8484 FLU IMMUNIZE NO ADMIN: HCPCS | Performed by: ORTHOPAEDIC SURGERY

## 2019-01-25 ENCOUNTER — PREP FOR PROCEDURE (OUTPATIENT)
Dept: ORTHOPEDIC SURGERY | Age: 39
End: 2019-01-25

## 2019-01-25 RX ORDER — SODIUM CHLORIDE 9 MG/ML
INJECTION, SOLUTION INTRAVENOUS CONTINUOUS
Status: CANCELLED | OUTPATIENT
Start: 2019-01-25

## 2019-01-25 RX ORDER — SODIUM CHLORIDE 0.9 % (FLUSH) 0.9 %
10 SYRINGE (ML) INJECTION PRN
Status: CANCELLED | OUTPATIENT
Start: 2019-01-25

## 2019-01-25 RX ORDER — SODIUM CHLORIDE 0.9 % (FLUSH) 0.9 %
10 SYRINGE (ML) INJECTION EVERY 12 HOURS SCHEDULED
Status: CANCELLED | OUTPATIENT
Start: 2019-01-25

## 2019-02-04 ENCOUNTER — OFFICE VISIT (OUTPATIENT)
Dept: NEUROLOGY | Age: 39
End: 2019-02-04
Payer: COMMERCIAL

## 2019-02-04 ENCOUNTER — TELEPHONE (OUTPATIENT)
Dept: FAMILY MEDICINE CLINIC | Age: 39
End: 2019-02-04

## 2019-02-04 VITALS
BODY MASS INDEX: 27.72 KG/M2 | RESPIRATION RATE: 12 BRPM | WEIGHT: 198 LBS | DIASTOLIC BLOOD PRESSURE: 100 MMHG | HEART RATE: 68 BPM | OXYGEN SATURATION: 97 % | SYSTOLIC BLOOD PRESSURE: 120 MMHG | HEIGHT: 71 IN

## 2019-02-04 DIAGNOSIS — R51.9 NONINTRACTABLE EPISODIC HEADACHE, UNSPECIFIED HEADACHE TYPE: Primary | ICD-10-CM

## 2019-02-04 PROCEDURE — 4004F PT TOBACCO SCREEN RCVD TLK: CPT | Performed by: PSYCHIATRY & NEUROLOGY

## 2019-02-04 PROCEDURE — 99204 OFFICE O/P NEW MOD 45 MIN: CPT | Performed by: PSYCHIATRY & NEUROLOGY

## 2019-02-04 PROCEDURE — G8484 FLU IMMUNIZE NO ADMIN: HCPCS | Performed by: PSYCHIATRY & NEUROLOGY

## 2019-02-04 PROCEDURE — G8419 CALC BMI OUT NRM PARAM NOF/U: HCPCS | Performed by: PSYCHIATRY & NEUROLOGY

## 2019-02-04 PROCEDURE — G8427 DOCREV CUR MEDS BY ELIG CLIN: HCPCS | Performed by: PSYCHIATRY & NEUROLOGY

## 2019-02-08 RX ORDER — DEXTROAMPHETAMINE SACCHARATE, AMPHETAMINE ASPARTATE, DEXTROAMPHETAMINE SULFATE AND AMPHETAMINE SULFATE 7.5; 7.5; 7.5; 7.5 MG/1; MG/1; MG/1; MG/1
30 TABLET ORAL DAILY
COMMUNITY
End: 2020-07-23

## 2019-02-08 RX ORDER — OLANZAPINE 5 MG/1
5 TABLET ORAL NIGHTLY
COMMUNITY
End: 2020-12-29 | Stop reason: ALTCHOICE

## 2019-02-08 RX ORDER — DEXTROAMPHETAMINE SACCHARATE, AMPHETAMINE ASPARTATE MONOHYDRATE, DEXTROAMPHETAMINE SULFATE AND AMPHETAMINE SULFATE 7.5; 7.5; 7.5; 7.5 MG/1; MG/1; MG/1; MG/1
30 CAPSULE, EXTENDED RELEASE ORAL EVERY MORNING
COMMUNITY
End: 2020-07-23 | Stop reason: SDUPTHER

## 2019-02-12 ENCOUNTER — ANESTHESIA EVENT (OUTPATIENT)
Dept: OPERATING ROOM | Age: 39
End: 2019-02-12
Payer: COMMERCIAL

## 2019-02-12 ENCOUNTER — HOSPITAL ENCOUNTER (OUTPATIENT)
Dept: PREADMISSION TESTING | Age: 39
Setting detail: OUTPATIENT SURGERY
Discharge: HOME | End: 2019-02-12
Payer: COMMERCIAL

## 2019-02-12 LAB
EKG ATRIAL RATE: 59 BPM
EKG P AXIS: 26 DEGREES
EKG P-R INTERVAL: 148 MS
EKG Q-T INTERVAL: 418 MS
EKG QRS DURATION: 106 MS
EKG QTC CALCULATION (BAZETT): 413 MS
EKG R AXIS: 51 DEGREES
EKG T AXIS: 65 DEGREES
EKG VENTRICULAR RATE: 59 BPM

## 2019-02-15 ENCOUNTER — ANESTHESIA (OUTPATIENT)
Dept: OPERATING ROOM | Age: 39
End: 2019-02-15
Payer: COMMERCIAL

## 2019-02-15 ENCOUNTER — HOSPITAL ENCOUNTER (OUTPATIENT)
Age: 39
Setting detail: OUTPATIENT SURGERY
Discharge: HOME | End: 2019-02-15
Attending: ORTHOPAEDIC SURGERY | Admitting: ORTHOPAEDIC SURGERY
Payer: COMMERCIAL

## 2019-02-15 VITALS — SYSTOLIC BLOOD PRESSURE: 133 MMHG | DIASTOLIC BLOOD PRESSURE: 76 MMHG | OXYGEN SATURATION: 94 %

## 2019-02-15 VITALS
DIASTOLIC BLOOD PRESSURE: 78 MMHG | BODY MASS INDEX: 28 KG/M2 | HEART RATE: 61 BPM | WEIGHT: 200 LBS | SYSTOLIC BLOOD PRESSURE: 128 MMHG | HEIGHT: 71 IN | OXYGEN SATURATION: 100 % | TEMPERATURE: 97 F | RESPIRATION RATE: 20 BRPM

## 2019-02-15 DIAGNOSIS — Z98.890 HISTORY OF SURGERY: Primary | ICD-10-CM

## 2019-02-15 PROCEDURE — 3600000002 HC SURGERY LEVEL 2 BASE: Performed by: ORTHOPAEDIC SURGERY

## 2019-02-15 PROCEDURE — 2500000003 HC RX 250 WO HCPCS: Performed by: NURSE ANESTHETIST, CERTIFIED REGISTERED

## 2019-02-15 PROCEDURE — 2709999900 HC NON-CHARGEABLE SUPPLY: Performed by: ORTHOPAEDIC SURGERY

## 2019-02-15 PROCEDURE — 3700000001 HC ADD 15 MINUTES (ANESTHESIA): Performed by: ORTHOPAEDIC SURGERY

## 2019-02-15 PROCEDURE — 2580000003 HC RX 258: Performed by: PHYSICIAN ASSISTANT

## 2019-02-15 PROCEDURE — 6360000002 HC RX W HCPCS: Performed by: NURSE ANESTHETIST, CERTIFIED REGISTERED

## 2019-02-15 PROCEDURE — 7100000011 HC PHASE II RECOVERY - ADDTL 15 MIN: Performed by: ORTHOPAEDIC SURGERY

## 2019-02-15 PROCEDURE — 2500000003 HC RX 250 WO HCPCS: Performed by: PHYSICIAN ASSISTANT

## 2019-02-15 PROCEDURE — 3600000012 HC SURGERY LEVEL 2 ADDTL 15MIN: Performed by: ORTHOPAEDIC SURGERY

## 2019-02-15 PROCEDURE — 7100000001 HC PACU RECOVERY - ADDTL 15 MIN: Performed by: ORTHOPAEDIC SURGERY

## 2019-02-15 PROCEDURE — 29848 WRIST ENDOSCOPY/SURGERY: CPT | Performed by: ORTHOPAEDIC SURGERY

## 2019-02-15 PROCEDURE — 2500000003 HC RX 250 WO HCPCS: Performed by: ORTHOPAEDIC SURGERY

## 2019-02-15 PROCEDURE — 3700000000 HC ANESTHESIA ATTENDED CARE: Performed by: ORTHOPAEDIC SURGERY

## 2019-02-15 PROCEDURE — 64718 REVISE ULNAR NERVE AT ELBOW: CPT | Performed by: ORTHOPAEDIC SURGERY

## 2019-02-15 PROCEDURE — 2720000010 HC SURG SUPPLY STERILE: Performed by: ORTHOPAEDIC SURGERY

## 2019-02-15 PROCEDURE — 7100000000 HC PACU RECOVERY - FIRST 15 MIN: Performed by: ORTHOPAEDIC SURGERY

## 2019-02-15 PROCEDURE — 7100000010 HC PHASE II RECOVERY - FIRST 15 MIN: Performed by: ORTHOPAEDIC SURGERY

## 2019-02-15 RX ORDER — DIPHENHYDRAMINE HYDROCHLORIDE 50 MG/ML
12.5 INJECTION INTRAMUSCULAR; INTRAVENOUS
Status: DISCONTINUED | OUTPATIENT
Start: 2019-02-15 | End: 2019-02-15 | Stop reason: HOSPADM

## 2019-02-15 RX ORDER — SODIUM CHLORIDE 9 MG/ML
INJECTION, SOLUTION INTRAVENOUS CONTINUOUS
Status: DISCONTINUED | OUTPATIENT
Start: 2019-02-15 | End: 2019-02-15 | Stop reason: HOSPADM

## 2019-02-15 RX ORDER — OXYCODONE HYDROCHLORIDE AND ACETAMINOPHEN 5; 325 MG/1; MG/1
1 TABLET ORAL
Status: DISCONTINUED | OUTPATIENT
Start: 2019-02-15 | End: 2019-02-15 | Stop reason: HOSPADM

## 2019-02-15 RX ORDER — SODIUM CHLORIDE 0.9 % (FLUSH) 0.9 %
10 SYRINGE (ML) INJECTION PRN
Status: DISCONTINUED | OUTPATIENT
Start: 2019-02-15 | End: 2019-02-15 | Stop reason: HOSPADM

## 2019-02-15 RX ORDER — OXYCODONE HYDROCHLORIDE AND ACETAMINOPHEN 5; 325 MG/1; MG/1
1 TABLET ORAL EVERY 6 HOURS PRN
Qty: 20 TABLET | Refills: 0 | Status: SHIPPED | OUTPATIENT
Start: 2019-02-15 | End: 2019-02-20

## 2019-02-15 RX ORDER — LIDOCAINE HYDROCHLORIDE 20 MG/ML
INJECTION, SOLUTION INFILTRATION; PERINEURAL PRN
Status: DISCONTINUED | OUTPATIENT
Start: 2019-02-15 | End: 2019-02-15 | Stop reason: SDUPTHER

## 2019-02-15 RX ORDER — FENTANYL CITRATE 50 UG/ML
INJECTION, SOLUTION INTRAMUSCULAR; INTRAVENOUS PRN
Status: DISCONTINUED | OUTPATIENT
Start: 2019-02-15 | End: 2019-02-15 | Stop reason: SDUPTHER

## 2019-02-15 RX ORDER — CLINDAMYCIN PHOSPHATE 900 MG/50ML
900 INJECTION INTRAVENOUS
Status: COMPLETED | OUTPATIENT
Start: 2019-02-15 | End: 2019-02-15

## 2019-02-15 RX ORDER — FENTANYL CITRATE 50 UG/ML
50 INJECTION, SOLUTION INTRAMUSCULAR; INTRAVENOUS EVERY 5 MIN PRN
Status: DISCONTINUED | OUTPATIENT
Start: 2019-02-15 | End: 2019-02-15 | Stop reason: HOSPADM

## 2019-02-15 RX ORDER — MEPERIDINE HYDROCHLORIDE 25 MG/ML
12.5 INJECTION INTRAMUSCULAR; INTRAVENOUS; SUBCUTANEOUS EVERY 5 MIN PRN
Status: DISCONTINUED | OUTPATIENT
Start: 2019-02-15 | End: 2019-02-15 | Stop reason: HOSPADM

## 2019-02-15 RX ORDER — SODIUM CHLORIDE 0.9 % (FLUSH) 0.9 %
10 SYRINGE (ML) INJECTION EVERY 12 HOURS SCHEDULED
Status: DISCONTINUED | OUTPATIENT
Start: 2019-02-15 | End: 2019-02-15 | Stop reason: HOSPADM

## 2019-02-15 RX ORDER — DEXAMETHASONE SODIUM PHOSPHATE 4 MG/ML
INJECTION, SOLUTION INTRA-ARTICULAR; INTRALESIONAL; INTRAMUSCULAR; INTRAVENOUS; SOFT TISSUE PRN
Status: DISCONTINUED | OUTPATIENT
Start: 2019-02-15 | End: 2019-02-15 | Stop reason: SDUPTHER

## 2019-02-15 RX ORDER — MIDAZOLAM HYDROCHLORIDE 1 MG/ML
INJECTION INTRAMUSCULAR; INTRAVENOUS PRN
Status: DISCONTINUED | OUTPATIENT
Start: 2019-02-15 | End: 2019-02-15 | Stop reason: SDUPTHER

## 2019-02-15 RX ORDER — BUPIVACAINE HYDROCHLORIDE AND EPINEPHRINE 5; 5 MG/ML; UG/ML
INJECTION, SOLUTION EPIDURAL; INTRACAUDAL; PERINEURAL PRN
Status: DISCONTINUED | OUTPATIENT
Start: 2019-02-15 | End: 2019-02-15 | Stop reason: ALTCHOICE

## 2019-02-15 RX ORDER — PROPOFOL 10 MG/ML
INJECTION, EMULSION INTRAVENOUS PRN
Status: DISCONTINUED | OUTPATIENT
Start: 2019-02-15 | End: 2019-02-15 | Stop reason: SDUPTHER

## 2019-02-15 RX ORDER — ONDANSETRON 2 MG/ML
INJECTION INTRAMUSCULAR; INTRAVENOUS PRN
Status: DISCONTINUED | OUTPATIENT
Start: 2019-02-15 | End: 2019-02-15 | Stop reason: SDUPTHER

## 2019-02-15 RX ORDER — FENTANYL CITRATE 50 UG/ML
25 INJECTION, SOLUTION INTRAMUSCULAR; INTRAVENOUS EVERY 5 MIN PRN
Status: DISCONTINUED | OUTPATIENT
Start: 2019-02-15 | End: 2019-02-15 | Stop reason: HOSPADM

## 2019-02-15 RX ORDER — PROMETHAZINE HYDROCHLORIDE 25 MG/ML
6.25 INJECTION, SOLUTION INTRAMUSCULAR; INTRAVENOUS
Status: DISCONTINUED | OUTPATIENT
Start: 2019-02-15 | End: 2019-02-15 | Stop reason: HOSPADM

## 2019-02-15 RX ADMIN — LIDOCAINE HYDROCHLORIDE 100 MG: 20 INJECTION, SOLUTION INFILTRATION; PERINEURAL at 08:35

## 2019-02-15 RX ADMIN — FENTANYL CITRATE 50 MCG: 50 INJECTION, SOLUTION INTRAMUSCULAR; INTRAVENOUS at 08:41

## 2019-02-15 RX ADMIN — MIDAZOLAM HYDROCHLORIDE 2 MG: 1 INJECTION, SOLUTION INTRAMUSCULAR; INTRAVENOUS at 08:29

## 2019-02-15 RX ADMIN — SODIUM CHLORIDE: 9 INJECTION, SOLUTION INTRAVENOUS at 08:29

## 2019-02-15 RX ADMIN — FENTANYL CITRATE 50 MCG: 50 INJECTION, SOLUTION INTRAMUSCULAR; INTRAVENOUS at 08:35

## 2019-02-15 RX ADMIN — PROPOFOL 200 MG: 10 INJECTION, EMULSION INTRAVENOUS at 08:35

## 2019-02-15 RX ADMIN — FENTANYL CITRATE 50 MCG: 50 INJECTION, SOLUTION INTRAMUSCULAR; INTRAVENOUS at 08:51

## 2019-02-15 RX ADMIN — ONDANSETRON HYDROCHLORIDE 4 MG: 2 INJECTION, SOLUTION INTRAMUSCULAR; INTRAVENOUS at 08:35

## 2019-02-15 RX ADMIN — DEXAMETHASONE SODIUM PHOSPHATE 10 MG: 4 INJECTION, SOLUTION INTRAMUSCULAR; INTRAVENOUS at 08:35

## 2019-02-15 RX ADMIN — CLINDAMYCIN IN 5 PERCENT DEXTROSE 900 MG: 18 INJECTION, SOLUTION INTRAVENOUS at 08:29

## 2019-02-15 ASSESSMENT — PULMONARY FUNCTION TESTS
PIF_VALUE: 0
PIF_VALUE: 19
PIF_VALUE: 19
PIF_VALUE: 15
PIF_VALUE: 1
PIF_VALUE: 20
PIF_VALUE: 22
PIF_VALUE: 22
PIF_VALUE: 25
PIF_VALUE: 23
PIF_VALUE: 23
PIF_VALUE: 22
PIF_VALUE: 17
PIF_VALUE: 18
PIF_VALUE: 26
PIF_VALUE: 22
PIF_VALUE: 1
PIF_VALUE: 22
PIF_VALUE: 0
PIF_VALUE: 18
PIF_VALUE: 22
PIF_VALUE: 22
PIF_VALUE: 17
PIF_VALUE: 5
PIF_VALUE: 22
PIF_VALUE: 1
PIF_VALUE: 22
PIF_VALUE: 22
PIF_VALUE: 5
PIF_VALUE: 22
PIF_VALUE: 19
PIF_VALUE: 19
PIF_VALUE: 0
PIF_VALUE: 22
PIF_VALUE: 19
PIF_VALUE: 5
PIF_VALUE: 22
PIF_VALUE: 0
PIF_VALUE: 22
PIF_VALUE: 22
PIF_VALUE: 19
PIF_VALUE: 1
PIF_VALUE: 3
PIF_VALUE: 22
PIF_VALUE: 22
PIF_VALUE: 15
PIF_VALUE: 22
PIF_VALUE: 17
PIF_VALUE: 22
PIF_VALUE: 18
PIF_VALUE: 0
PIF_VALUE: 19
PIF_VALUE: 10
PIF_VALUE: 23
PIF_VALUE: 14
PIF_VALUE: 19
PIF_VALUE: 22

## 2019-02-15 ASSESSMENT — PAIN DESCRIPTION - LOCATION
LOCATION: HAND
LOCATION: HAND
LOCATION: WRIST
LOCATION: WRIST

## 2019-02-15 ASSESSMENT — PAIN DESCRIPTION - ORIENTATION
ORIENTATION: RIGHT

## 2019-02-15 ASSESSMENT — PAIN DESCRIPTION - PAIN TYPE
TYPE: SURGICAL PAIN

## 2019-02-15 ASSESSMENT — PAIN SCALES - GENERAL
PAINLEVEL_OUTOF10: 0
PAINLEVEL_OUTOF10: 6
PAINLEVEL_OUTOF10: 0
PAINLEVEL_OUTOF10: 0

## 2019-02-15 ASSESSMENT — PAIN DESCRIPTION - DESCRIPTORS
DESCRIPTORS: DISCOMFORT

## 2019-02-21 PROBLEM — G56.20 CUBITAL TUNNEL SYNDROME: Status: RESOLVED | Noted: 2017-01-26 | Resolved: 2019-02-21

## 2019-02-25 ENCOUNTER — OFFICE VISIT (OUTPATIENT)
Dept: ORTHOPEDIC SURGERY | Age: 39
End: 2019-02-25
Payer: COMMERCIAL

## 2019-02-25 VITALS
HEART RATE: 78 BPM | RESPIRATION RATE: 20 BRPM | TEMPERATURE: 98.5 F | SYSTOLIC BLOOD PRESSURE: 134 MMHG | DIASTOLIC BLOOD PRESSURE: 88 MMHG | BODY MASS INDEX: 28.14 KG/M2 | HEIGHT: 71 IN | WEIGHT: 201 LBS

## 2019-02-25 DIAGNOSIS — G56.01 RIGHT CARPAL TUNNEL SYNDROME: ICD-10-CM

## 2019-02-25 DIAGNOSIS — G56.21 ULNAR NEUROPATHY AT ELBOW, RIGHT: Primary | ICD-10-CM

## 2019-02-25 PROCEDURE — L3908 WHO COCK-UP NONMOLDE PRE OTS: HCPCS | Performed by: ORTHOPAEDIC SURGERY

## 2019-02-25 PROCEDURE — 99024 POSTOP FOLLOW-UP VISIT: CPT | Performed by: ORTHOPAEDIC SURGERY

## 2019-02-25 RX ORDER — HYDROCODONE BITARTRATE AND ACETAMINOPHEN 5; 325 MG/1; MG/1
1 TABLET ORAL EVERY 6 HOURS PRN
Qty: 28 TABLET | Refills: 0 | Status: SHIPPED | OUTPATIENT
Start: 2019-02-25 | End: 2019-03-05 | Stop reason: SDUPTHER

## 2019-03-05 DIAGNOSIS — G56.21 ULNAR NEUROPATHY AT ELBOW, RIGHT: ICD-10-CM

## 2019-03-05 DIAGNOSIS — G56.01 RIGHT CARPAL TUNNEL SYNDROME: ICD-10-CM

## 2019-03-05 RX ORDER — HYDROCODONE BITARTRATE AND ACETAMINOPHEN 5; 325 MG/1; MG/1
1 TABLET ORAL EVERY 6 HOURS PRN
Qty: 28 TABLET | Refills: 0 | Status: SHIPPED | OUTPATIENT
Start: 2019-03-05 | End: 2019-03-11 | Stop reason: SDUPTHER

## 2019-03-11 DIAGNOSIS — G56.01 RIGHT CARPAL TUNNEL SYNDROME: ICD-10-CM

## 2019-03-11 DIAGNOSIS — G56.21 ULNAR NEUROPATHY AT ELBOW, RIGHT: ICD-10-CM

## 2019-03-11 RX ORDER — HYDROCODONE BITARTRATE AND ACETAMINOPHEN 5; 325 MG/1; MG/1
1 TABLET ORAL EVERY 8 HOURS PRN
Qty: 21 TABLET | Refills: 0 | Status: SHIPPED | OUTPATIENT
Start: 2019-03-11 | End: 2019-03-19 | Stop reason: SDUPTHER

## 2019-03-19 DIAGNOSIS — G56.21 ULNAR NEUROPATHY AT ELBOW, RIGHT: ICD-10-CM

## 2019-03-19 DIAGNOSIS — G56.01 RIGHT CARPAL TUNNEL SYNDROME: ICD-10-CM

## 2019-03-19 RX ORDER — HYDROCODONE BITARTRATE AND ACETAMINOPHEN 5; 325 MG/1; MG/1
1 TABLET ORAL EVERY 8 HOURS PRN
Qty: 21 TABLET | Refills: 0 | Status: SHIPPED | OUTPATIENT
Start: 2019-03-19 | End: 2019-03-26 | Stop reason: SDUPTHER

## 2019-03-21 ENCOUNTER — OFFICE VISIT (OUTPATIENT)
Dept: NEUROSURGERY | Age: 39
End: 2019-03-21
Payer: COMMERCIAL

## 2019-03-21 VITALS
BODY MASS INDEX: 28.56 KG/M2 | SYSTOLIC BLOOD PRESSURE: 130 MMHG | HEART RATE: 66 BPM | HEIGHT: 71 IN | WEIGHT: 204 LBS | DIASTOLIC BLOOD PRESSURE: 89 MMHG

## 2019-03-21 DIAGNOSIS — M54.16 LUMBAR RADICULOPATHY: ICD-10-CM

## 2019-03-21 DIAGNOSIS — M54.12 CERVICAL RADICULOPATHY: Primary | ICD-10-CM

## 2019-03-21 PROCEDURE — G8419 CALC BMI OUT NRM PARAM NOF/U: HCPCS | Performed by: PHYSICIAN ASSISTANT

## 2019-03-21 PROCEDURE — G8484 FLU IMMUNIZE NO ADMIN: HCPCS | Performed by: PHYSICIAN ASSISTANT

## 2019-03-21 PROCEDURE — 99213 OFFICE O/P EST LOW 20 MIN: CPT | Performed by: PHYSICIAN ASSISTANT

## 2019-03-21 PROCEDURE — G8427 DOCREV CUR MEDS BY ELIG CLIN: HCPCS | Performed by: PHYSICIAN ASSISTANT

## 2019-03-21 PROCEDURE — 4004F PT TOBACCO SCREEN RCVD TLK: CPT | Performed by: PHYSICIAN ASSISTANT

## 2019-03-26 DIAGNOSIS — G56.01 RIGHT CARPAL TUNNEL SYNDROME: ICD-10-CM

## 2019-03-26 DIAGNOSIS — G56.21 ULNAR NEUROPATHY AT ELBOW, RIGHT: ICD-10-CM

## 2019-03-26 RX ORDER — HYDROCODONE BITARTRATE AND ACETAMINOPHEN 5; 325 MG/1; MG/1
1 TABLET ORAL EVERY 8 HOURS PRN
Qty: 21 TABLET | Refills: 0 | Status: SHIPPED | OUTPATIENT
Start: 2019-03-26 | End: 2019-04-02 | Stop reason: SDUPTHER

## 2019-03-28 ENCOUNTER — OFFICE VISIT (OUTPATIENT)
Dept: ORTHOPEDIC SURGERY | Age: 39
End: 2019-03-28
Payer: COMMERCIAL

## 2019-03-28 VITALS
TEMPERATURE: 97.9 F | DIASTOLIC BLOOD PRESSURE: 87 MMHG | BODY MASS INDEX: 28.98 KG/M2 | WEIGHT: 207 LBS | HEART RATE: 91 BPM | RESPIRATION RATE: 18 BRPM | SYSTOLIC BLOOD PRESSURE: 122 MMHG | HEIGHT: 71 IN

## 2019-03-28 DIAGNOSIS — G56.21 ULNAR NEUROPATHY AT ELBOW, RIGHT: Primary | ICD-10-CM

## 2019-03-28 DIAGNOSIS — G56.01 RIGHT CARPAL TUNNEL SYNDROME: ICD-10-CM

## 2019-03-28 PROCEDURE — 99024 POSTOP FOLLOW-UP VISIT: CPT | Performed by: ORTHOPAEDIC SURGERY

## 2019-03-28 PROCEDURE — E0191 PROTECTOR HEEL OR ELBOW: HCPCS | Performed by: ORTHOPAEDIC SURGERY

## 2019-04-02 DIAGNOSIS — G56.21 ULNAR NEUROPATHY AT ELBOW, RIGHT: ICD-10-CM

## 2019-04-02 DIAGNOSIS — G56.01 RIGHT CARPAL TUNNEL SYNDROME: ICD-10-CM

## 2019-04-02 RX ORDER — HYDROCODONE BITARTRATE AND ACETAMINOPHEN 5; 325 MG/1; MG/1
1 TABLET ORAL EVERY 8 HOURS PRN
Qty: 21 TABLET | Refills: 0 | Status: SHIPPED | OUTPATIENT
Start: 2019-04-02 | End: 2019-04-09 | Stop reason: SDUPTHER

## 2019-04-02 NOTE — TELEPHONE ENCOUNTER
Pt requesting refill. Last given Norco 5-325mg 1q6. Filled on 3-27-19  Last ov 3-28-19  Next ov 5-9-19  Surg on 2/15/19 right carpal tunnel, right ulnar nerve decompression.    Oarrs Completed

## 2019-04-09 DIAGNOSIS — G56.21 ULNAR NEUROPATHY AT ELBOW, RIGHT: ICD-10-CM

## 2019-04-09 DIAGNOSIS — G56.01 RIGHT CARPAL TUNNEL SYNDROME: ICD-10-CM

## 2019-04-09 RX ORDER — HYDROCODONE BITARTRATE AND ACETAMINOPHEN 5; 325 MG/1; MG/1
1 TABLET ORAL EVERY 8 HOURS PRN
Qty: 21 TABLET | Refills: 0 | Status: SHIPPED | OUTPATIENT
Start: 2019-04-09 | End: 2019-04-19 | Stop reason: SDUPTHER

## 2019-04-09 NOTE — TELEPHONE ENCOUNTER
Patient requesting refill on pain meds; OARRS completed. Patient is s/p right endoscopic carpal tunnel release and right ulnar nerve decompression at elbow with anterior subcutaneous transposition 2/15/19. Last seen on 3/28/19, returning for follow up on 5/9/19.

## 2019-04-17 DIAGNOSIS — G56.01 RIGHT CARPAL TUNNEL SYNDROME: ICD-10-CM

## 2019-04-17 DIAGNOSIS — G56.21 ULNAR NEUROPATHY AT ELBOW, RIGHT: Primary | ICD-10-CM

## 2019-04-17 RX ORDER — ACETAMINOPHEN AND CODEINE PHOSPHATE 300; 30 MG/1; MG/1
1 TABLET ORAL EVERY 4 HOURS PRN
Qty: 18 TABLET | Refills: 0 | Status: SHIPPED | OUTPATIENT
Start: 2019-04-17 | End: 2019-04-20

## 2019-04-17 NOTE — TELEPHONE ENCOUNTER
Pt requesting refill, OARRS complete. Last seen on 3/28/19. Right endoscopic CTR and ulnar nerve decompression at elbow with anterior subcutaneous transposition on 2/15. F/U scheduled for 5/9/19. Norco 5 mg every 8 hours given on 4/9.

## 2019-04-19 DIAGNOSIS — G56.01 RIGHT CARPAL TUNNEL SYNDROME: ICD-10-CM

## 2019-04-19 DIAGNOSIS — G56.21 ULNAR NEUROPATHY AT ELBOW, RIGHT: ICD-10-CM

## 2019-04-19 RX ORDER — HYDROCODONE BITARTRATE AND ACETAMINOPHEN 5; 325 MG/1; MG/1
1 TABLET ORAL EVERY 8 HOURS PRN
Qty: 21 TABLET | Refills: 0 | Status: SHIPPED | OUTPATIENT
Start: 2019-04-19 | End: 2019-04-26 | Stop reason: SDUPTHER

## 2019-04-19 NOTE — TELEPHONE ENCOUNTER
Patient states Tylenol 3 and  Tramadol make him nauseous.    Refill step down to Tylenol 3 filled on 4-18-19  Norco 5-325mg filled last 4-9-19  Oarrs completed

## 2019-04-26 DIAGNOSIS — G56.01 RIGHT CARPAL TUNNEL SYNDROME: ICD-10-CM

## 2019-04-26 DIAGNOSIS — G56.21 ULNAR NEUROPATHY AT ELBOW, RIGHT: ICD-10-CM

## 2019-04-26 RX ORDER — HYDROCODONE BITARTRATE AND ACETAMINOPHEN 5; 325 MG/1; MG/1
1 TABLET ORAL EVERY 8 HOURS PRN
Qty: 21 TABLET | Refills: 0 | Status: SHIPPED | OUTPATIENT
Start: 2019-04-26 | End: 2019-05-03

## 2019-04-30 ENCOUNTER — HOSPITAL ENCOUNTER (OUTPATIENT)
Dept: GENERAL RADIOLOGY | Age: 39
Discharge: HOME | End: 2019-05-02
Payer: COMMERCIAL

## 2019-04-30 ENCOUNTER — HOSPITAL ENCOUNTER (OUTPATIENT)
Age: 39
Discharge: HOME | End: 2019-05-02
Payer: COMMERCIAL

## 2019-04-30 DIAGNOSIS — M54.12 CERVICAL RADICULOPATHY: ICD-10-CM

## 2019-04-30 DIAGNOSIS — M54.16 LUMBAR RADICULOPATHY: ICD-10-CM

## 2019-04-30 PROCEDURE — 72100 X-RAY EXAM L-S SPINE 2/3 VWS: CPT

## 2019-04-30 PROCEDURE — 72040 X-RAY EXAM NECK SPINE 2-3 VW: CPT

## 2019-04-30 PROCEDURE — 72120 X-RAY BEND ONLY L-S SPINE: CPT

## 2019-05-01 DIAGNOSIS — G56.01 RIGHT CARPAL TUNNEL SYNDROME: ICD-10-CM

## 2019-05-01 DIAGNOSIS — G56.21 ULNAR NEUROPATHY AT ELBOW, RIGHT: ICD-10-CM

## 2019-05-01 RX ORDER — HYDROCODONE BITARTRATE AND ACETAMINOPHEN 5; 325 MG/1; MG/1
1 TABLET ORAL EVERY 8 HOURS PRN
Qty: 21 TABLET | Refills: 0 | OUTPATIENT
Start: 2019-05-01 | End: 2019-05-08

## 2019-05-01 NOTE — TELEPHONE ENCOUNTER
Refill request  Humphrey Wallace Patient states Tylenol 3 and  Tramadol make him nauseous. Right endoscopic carpal tunnel release.   Right ulnar nerve decompression at elbow with anterior subcutaneous transposition 2-15-19  Last ov 3-28-19  Next ov 5-9-19  Oarrs completed  Last refill 4-26-19

## 2019-05-02 ENCOUNTER — OFFICE VISIT (OUTPATIENT)
Dept: NEUROSURGERY | Age: 39
End: 2019-05-02
Payer: COMMERCIAL

## 2019-05-02 VITALS
HEIGHT: 71 IN | WEIGHT: 204 LBS | DIASTOLIC BLOOD PRESSURE: 91 MMHG | SYSTOLIC BLOOD PRESSURE: 134 MMHG | BODY MASS INDEX: 28.56 KG/M2 | HEART RATE: 93 BPM

## 2019-05-02 DIAGNOSIS — M54.12 CERVICAL RADICULOPATHY: Primary | ICD-10-CM

## 2019-05-02 PROCEDURE — 99213 OFFICE O/P EST LOW 20 MIN: CPT | Performed by: PHYSICIAN ASSISTANT

## 2019-05-02 PROCEDURE — G8427 DOCREV CUR MEDS BY ELIG CLIN: HCPCS | Performed by: PHYSICIAN ASSISTANT

## 2019-05-02 PROCEDURE — 4004F PT TOBACCO SCREEN RCVD TLK: CPT | Performed by: PHYSICIAN ASSISTANT

## 2019-05-02 PROCEDURE — G8419 CALC BMI OUT NRM PARAM NOF/U: HCPCS | Performed by: PHYSICIAN ASSISTANT

## 2019-05-03 DIAGNOSIS — G56.21 ULNAR NEUROPATHY AT ELBOW, RIGHT: Primary | ICD-10-CM

## 2019-05-03 RX ORDER — IBUPROFEN 800 MG/1
800 TABLET ORAL EVERY 6 HOURS PRN
Qty: 120 TABLET | Refills: 1 | Status: SHIPPED
Start: 2019-05-03 | End: 2021-02-16 | Stop reason: SDUPTHER

## 2019-05-09 ENCOUNTER — TELEPHONE (OUTPATIENT)
Dept: ORTHOPEDIC SURGERY | Age: 39
End: 2019-05-09

## 2019-07-09 DIAGNOSIS — M54.2 NECK PAIN: Primary | ICD-10-CM

## 2019-07-16 ENCOUNTER — APPOINTMENT (OUTPATIENT)
Dept: GENERAL RADIOLOGY | Age: 39
End: 2019-07-16
Payer: COMMERCIAL

## 2019-07-16 ENCOUNTER — HOSPITAL ENCOUNTER (EMERGENCY)
Age: 39
Discharge: HOME | End: 2019-07-16
Payer: COMMERCIAL

## 2019-07-16 VITALS
SYSTOLIC BLOOD PRESSURE: 134 MMHG | HEIGHT: 70 IN | HEART RATE: 80 BPM | OXYGEN SATURATION: 98 % | DIASTOLIC BLOOD PRESSURE: 88 MMHG | TEMPERATURE: 99.1 F | RESPIRATION RATE: 16 BRPM | WEIGHT: 195 LBS | BODY MASS INDEX: 27.92 KG/M2

## 2019-07-16 DIAGNOSIS — M54.2 NECK PAIN: Primary | ICD-10-CM

## 2019-07-16 DIAGNOSIS — M54.2 CHRONIC NECK PAIN: ICD-10-CM

## 2019-07-16 DIAGNOSIS — G89.29 CHRONIC NECK PAIN: ICD-10-CM

## 2019-07-16 PROCEDURE — 72050 X-RAY EXAM NECK SPINE 4/5VWS: CPT

## 2019-07-16 PROCEDURE — 6370000000 HC RX 637 (ALT 250 FOR IP): Performed by: NURSE PRACTITIONER

## 2019-07-16 PROCEDURE — 99283 EMERGENCY DEPT VISIT LOW MDM: CPT

## 2019-07-16 RX ORDER — NAPROXEN 500 MG/1
500 TABLET ORAL 2 TIMES DAILY PRN
Qty: 28 TABLET | Refills: 0 | Status: SHIPPED | OUTPATIENT
Start: 2019-07-16 | End: 2020-12-29

## 2019-07-16 RX ORDER — OXYCODONE HYDROCHLORIDE AND ACETAMINOPHEN 5; 325 MG/1; MG/1
1 TABLET ORAL ONCE
Status: COMPLETED | OUTPATIENT
Start: 2019-07-16 | End: 2019-07-16

## 2019-07-16 RX ADMIN — OXYCODONE HYDROCHLORIDE AND ACETAMINOPHEN 1 TABLET: 5; 325 TABLET ORAL at 21:51

## 2019-07-16 ASSESSMENT — PAIN SCALES - GENERAL
PAINLEVEL_OUTOF10: 7
PAINLEVEL_OUTOF10: 8

## 2019-12-20 ENCOUNTER — OFFICE VISIT (OUTPATIENT)
Dept: FAMILY MEDICINE CLINIC | Age: 39
End: 2019-12-20
Payer: COMMERCIAL

## 2019-12-20 VITALS
OXYGEN SATURATION: 98 % | HEART RATE: 62 BPM | SYSTOLIC BLOOD PRESSURE: 140 MMHG | DIASTOLIC BLOOD PRESSURE: 86 MMHG | TEMPERATURE: 98.8 F

## 2019-12-20 DIAGNOSIS — R11.2 NON-INTRACTABLE VOMITING WITH NAUSEA, UNSPECIFIED VOMITING TYPE: ICD-10-CM

## 2019-12-20 DIAGNOSIS — A08.4 VIRAL GASTROENTERITIS: Primary | ICD-10-CM

## 2019-12-20 DIAGNOSIS — R11.0 NAUSEA: ICD-10-CM

## 2019-12-20 DIAGNOSIS — R19.7 DIARRHEA, UNSPECIFIED TYPE: ICD-10-CM

## 2019-12-20 PROCEDURE — G8419 CALC BMI OUT NRM PARAM NOF/U: HCPCS | Performed by: INTERNAL MEDICINE

## 2019-12-20 PROCEDURE — 99213 OFFICE O/P EST LOW 20 MIN: CPT | Performed by: INTERNAL MEDICINE

## 2019-12-20 PROCEDURE — G8428 CUR MEDS NOT DOCUMENT: HCPCS | Performed by: INTERNAL MEDICINE

## 2019-12-20 PROCEDURE — G8484 FLU IMMUNIZE NO ADMIN: HCPCS | Performed by: INTERNAL MEDICINE

## 2019-12-20 PROCEDURE — 4004F PT TOBACCO SCREEN RCVD TLK: CPT | Performed by: INTERNAL MEDICINE

## 2019-12-20 RX ORDER — ONDANSETRON 4 MG/1
4 TABLET, FILM COATED ORAL 3 TIMES DAILY PRN
Qty: 15 TABLET | Refills: 0 | Status: SHIPPED
Start: 2019-12-20 | End: 2021-02-16 | Stop reason: SDUPTHER

## 2019-12-21 ASSESSMENT — ENCOUNTER SYMPTOMS
COUGH: 0
SHORTNESS OF BREATH: 0
ABDOMINAL PAIN: 0
DIARRHEA: 1
NAUSEA: 1
VOMITING: 1

## 2020-02-06 ENCOUNTER — TELEPHONE (OUTPATIENT)
Dept: FAMILY MEDICINE CLINIC | Age: 40
End: 2020-02-06

## 2020-02-06 NOTE — TELEPHONE ENCOUNTER
Patient calling in stated he tried to get a refill from pharmacy on his Norvasc they informed him it was , I informed patient that the prescription is from 2018 and he was only seen her recently through [facility], I also informed him that he would need to set up an appt with his PCP to have an updated evaluation, stated  Has retired offered to transfer him to scheduling to establish a new pt appt he declined and stated he would have to call back.

## 2020-07-23 ENCOUNTER — TELEPHONE (OUTPATIENT)
Dept: FAMILY MEDICINE CLINIC | Age: 40
End: 2020-07-23

## 2020-07-23 RX ORDER — DEXTROAMPHETAMINE SACCHARATE, AMPHETAMINE ASPARTATE MONOHYDRATE, DEXTROAMPHETAMINE SULFATE AND AMPHETAMINE SULFATE 7.5; 7.5; 7.5; 7.5 MG/1; MG/1; MG/1; MG/1
30 CAPSULE, EXTENDED RELEASE ORAL EVERY MORNING
Qty: 30 CAPSULE | Refills: 0 | Status: SHIPPED
Start: 2020-07-23 | End: 2020-08-21 | Stop reason: SDUPTHER

## 2020-08-03 RX ORDER — GABAPENTIN 800 MG/1
800 TABLET ORAL 3 TIMES DAILY
Qty: 90 TABLET | Refills: 5 | Status: SHIPPED
Start: 2020-08-03 | End: 2020-12-29 | Stop reason: SDUPTHER

## 2020-08-10 RX ORDER — ALPRAZOLAM 0.5 MG/1
0.5 TABLET ORAL NIGHTLY PRN
Qty: 30 TABLET | Refills: 2 | OUTPATIENT
Start: 2020-08-10 | End: 2020-09-09

## 2020-08-14 RX ORDER — ALPRAZOLAM 0.5 MG/1
0.5 TABLET ORAL NIGHTLY PRN
COMMUNITY
End: 2020-10-13 | Stop reason: SDUPTHER

## 2020-08-21 RX ORDER — DEXTROAMPHETAMINE SACCHARATE, AMPHETAMINE ASPARTATE MONOHYDRATE, DEXTROAMPHETAMINE SULFATE AND AMPHETAMINE SULFATE 7.5; 7.5; 7.5; 7.5 MG/1; MG/1; MG/1; MG/1
30 CAPSULE, EXTENDED RELEASE ORAL EVERY MORNING
Qty: 30 CAPSULE | Refills: 0 | Status: SHIPPED
Start: 2020-08-21 | End: 2020-09-08 | Stop reason: SDUPTHER

## 2020-09-09 RX ORDER — DEXTROAMPHETAMINE SACCHARATE, AMPHETAMINE ASPARTATE MONOHYDRATE, DEXTROAMPHETAMINE SULFATE AND AMPHETAMINE SULFATE 7.5; 7.5; 7.5; 7.5 MG/1; MG/1; MG/1; MG/1
30 CAPSULE, EXTENDED RELEASE ORAL EVERY MORNING
Qty: 30 CAPSULE | Refills: 0 | Status: SHIPPED | OUTPATIENT
Start: 2020-09-09 | End: 2020-10-13 | Stop reason: SDUPTHER

## 2020-09-17 RX ORDER — DEXTROAMPHETAMINE SACCHARATE, AMPHETAMINE ASPARTATE, DEXTROAMPHETAMINE SULFATE AND AMPHETAMINE SULFATE 7.5; 7.5; 7.5; 7.5 MG/1; MG/1; MG/1; MG/1
30 TABLET ORAL DAILY
Qty: 30 TABLET | Refills: 0 | OUTPATIENT
Start: 2020-09-17 | End: 2020-10-17

## 2020-09-17 NOTE — TELEPHONE ENCOUNTER
Mallory Delgado can you look at my Jul note page 4 where I was outside my comfort range witting him this high of a dose and was only going to write for the extended release. If he is not good with that I need to send him to a Counselor so that he may benefit from a more specialized approach to his care.   Im just not sure if this was a mistake and he ment to get the extended release

## 2020-09-17 NOTE — TELEPHONE ENCOUNTER
Talked to Kathy Martinez, he is ok with only being on the XR, if that's all Keaton is comfortable with. He is open to seeing a Counselor to get other meds. He will discuss this with Keaton at his 10-8-2020 appt. Kathy Martinez does not need a refill on the XR.

## 2020-10-06 RX ORDER — LAMOTRIGINE 150 MG/1
150 TABLET ORAL NIGHTLY
Qty: 90 TABLET | Refills: 1 | Status: SHIPPED
Start: 2020-10-06 | End: 2021-02-16

## 2020-10-13 RX ORDER — DULOXETIN HYDROCHLORIDE 60 MG/1
60 CAPSULE, DELAYED RELEASE ORAL 2 TIMES DAILY
Qty: 60 CAPSULE | Refills: 2 | Status: SHIPPED
Start: 2020-10-13 | End: 2021-02-15

## 2020-10-13 RX ORDER — DEXTROAMPHETAMINE SACCHARATE, AMPHETAMINE ASPARTATE MONOHYDRATE, DEXTROAMPHETAMINE SULFATE AND AMPHETAMINE SULFATE 7.5; 7.5; 7.5; 7.5 MG/1; MG/1; MG/1; MG/1
30 CAPSULE, EXTENDED RELEASE ORAL EVERY MORNING
Qty: 30 CAPSULE | Refills: 0 | Status: SHIPPED
Start: 2020-10-13 | End: 2020-11-23 | Stop reason: SDUPTHER

## 2020-10-13 RX ORDER — ALPRAZOLAM 0.5 MG/1
0.5 TABLET ORAL NIGHTLY PRN
Qty: 30 TABLET | Refills: 0 | Status: SHIPPED | OUTPATIENT
Start: 2020-10-13 | End: 2020-11-12

## 2020-10-13 NOTE — TELEPHONE ENCOUNTER
Pt called in requesting refill of his Cymbalta 60 mg, Xanax 0.5 mg, and Adderall XR 30 mg be sent to [facility] in [place], [place]. Medications have been pended for Pb García CNP to sign off on.

## 2020-11-23 RX ORDER — ALPRAZOLAM 0.5 MG/1
0.5 TABLET ORAL DAILY
Qty: 30 TABLET | Refills: 0 | Status: SHIPPED | OUTPATIENT
Start: 2020-11-23 | End: 2020-12-23

## 2020-11-23 RX ORDER — ALPRAZOLAM 0.5 MG/1
0.5 TABLET ORAL DAILY
COMMUNITY
End: 2020-11-23 | Stop reason: SDUPTHER

## 2020-11-23 RX ORDER — DEXTROAMPHETAMINE SACCHARATE, AMPHETAMINE ASPARTATE MONOHYDRATE, DEXTROAMPHETAMINE SULFATE AND AMPHETAMINE SULFATE 7.5; 7.5; 7.5; 7.5 MG/1; MG/1; MG/1; MG/1
30 CAPSULE, EXTENDED RELEASE ORAL EVERY MORNING
Qty: 30 CAPSULE | Refills: 0 | Status: SHIPPED
Start: 2020-11-23 | End: 2020-12-29 | Stop reason: SDUPTHER

## 2020-12-22 ENCOUNTER — TELEPHONE (OUTPATIENT)
Dept: FAMILY MEDICINE CLINIC | Age: 40
End: 2020-12-22

## 2020-12-22 RX ORDER — ALPRAZOLAM 0.5 MG/1
0.5 TABLET ORAL DAILY
Qty: 30 TABLET | Refills: 0 | OUTPATIENT
Start: 2020-12-22 | End: 2021-01-21

## 2020-12-22 RX ORDER — GABAPENTIN 800 MG/1
800 TABLET ORAL 3 TIMES DAILY
Qty: 90 TABLET | Refills: 0 | OUTPATIENT
Start: 2020-12-22 | End: 2021-06-20

## 2020-12-22 RX ORDER — DEXTROAMPHETAMINE SACCHARATE, AMPHETAMINE ASPARTATE MONOHYDRATE, DEXTROAMPHETAMINE SULFATE AND AMPHETAMINE SULFATE 7.5; 7.5; 7.5; 7.5 MG/1; MG/1; MG/1; MG/1
30 CAPSULE, EXTENDED RELEASE ORAL EVERY MORNING
Qty: 30 CAPSULE | Refills: 0 | OUTPATIENT
Start: 2020-12-22 | End: 2021-01-21

## 2020-12-22 NOTE — TELEPHONE ENCOUNTER
I sent WESLEY Block,aprn-bc 3 medications for refill (alprazolam, adderall and gabapentin) he refused to fill the medications because he felt like there was way too much time between visits for the amount of medication he is on. I informed the patient and he would like seen tomorrow - I informed him no provider in the office he will have to call tomorrow to schedule appointment and not sure when he will be able to be seen due to holiday schedule. I also asked him if he received his medication while he was in prison and he said he did not.

## 2020-12-22 NOTE — TELEPHONE ENCOUNTER
Bw 01-, appt 01-. I called patient he was last seen in office 07-. I informed him he must be seen every 3 months to continue to get Adderall and xanax and he stated he was in care home from October until 11-. My reply to that -  he was scheduled for BW on 11- and appointment 12- and he stated he cancelled his appointment since he missed his blood work and he rescheduled both appointment for January. I informed patient that it will be your decision as to whether the medication is filled and if you do authorize the medication and he does not keep January appointments he will not be getting any more medication.

## 2020-12-22 NOTE — TELEPHONE ENCOUNTER
Patient should have let us know I feel like this is way too much time in between visits for the amount of medication that he is on

## 2020-12-22 NOTE — TELEPHONE ENCOUNTER
I spoke with patient about this and I made a new telephone encounter - see that encounter for documentation. Patient informed rx refused and he is going to call back tomorrow and schedule appointment. Imtiaz Randolph

## 2020-12-24 ENCOUNTER — TELEPHONE (OUTPATIENT)
Dept: FAMILY MEDICINE CLINIC | Age: 40
End: 2020-12-24

## 2020-12-24 NOTE — TELEPHONE ENCOUNTER
Pt asking when he can get his medications refilled as he states he can not go into withdrawal, plus he has not been able to get into the office for his appts with all the covid stuff going on.  Please advise pt

## 2020-12-28 ENCOUNTER — NURSE ONLY (OUTPATIENT)
Dept: FAMILY MEDICINE CLINIC | Age: 40
End: 2020-12-28
Payer: COMMERCIAL

## 2020-12-28 VITALS — TEMPERATURE: 97.8 F

## 2020-12-28 DIAGNOSIS — I10 ESSENTIAL HYPERTENSION: ICD-10-CM

## 2020-12-28 DIAGNOSIS — F31.9 BIPOLAR AFFECTIVE DISORDER, REMISSION STATUS UNSPECIFIED (HCC): ICD-10-CM

## 2020-12-28 DIAGNOSIS — F90.9 ATTENTION DEFICIT HYPERACTIVITY DISORDER (ADHD), UNSPECIFIED ADHD TYPE: ICD-10-CM

## 2020-12-28 DIAGNOSIS — F41.9 ANXIETY: ICD-10-CM

## 2020-12-28 LAB
BASOPHILS ABSOLUTE: 0.06 E9/L (ref 0–0.2)
BASOPHILS RELATIVE PERCENT: 0.7 % (ref 0–2)
EOSINOPHILS ABSOLUTE: 0.52 E9/L (ref 0.05–0.5)
EOSINOPHILS RELATIVE PERCENT: 5.9 % (ref 0–6)
HCT VFR BLD CALC: 44.6 % (ref 37–54)
HEMOGLOBIN: 13.8 G/DL (ref 12.5–16.5)
IMMATURE GRANULOCYTES #: 0.02 E9/L
IMMATURE GRANULOCYTES %: 0.2 % (ref 0–5)
LYMPHOCYTES ABSOLUTE: 3.85 E9/L (ref 1.5–4)
LYMPHOCYTES RELATIVE PERCENT: 43.6 % (ref 20–42)
MCH RBC QN AUTO: 29.1 PG (ref 26–35)
MCHC RBC AUTO-ENTMCNC: 30.9 % (ref 32–34.5)
MCV RBC AUTO: 94.1 FL (ref 80–99.9)
MONOCYTES ABSOLUTE: 0.54 E9/L (ref 0.1–0.95)
MONOCYTES RELATIVE PERCENT: 6.1 % (ref 2–12)
NEUTROPHILS ABSOLUTE: 3.84 E9/L (ref 1.8–7.3)
NEUTROPHILS RELATIVE PERCENT: 43.5 % (ref 43–80)
PDW BLD-RTO: 13.1 FL (ref 11.5–15)
PLATELET # BLD: 404 E9/L (ref 130–450)
PMV BLD AUTO: 10.3 FL (ref 7–12)
RBC # BLD: 4.74 E12/L (ref 3.8–5.8)
WBC # BLD: 8.8 E9/L (ref 4.5–11.5)

## 2020-12-28 PROCEDURE — 36415 COLL VENOUS BLD VENIPUNCTURE: CPT | Performed by: NURSE PRACTITIONER

## 2020-12-28 PROCEDURE — 99999 PR OFFICE/OUTPT VISIT,PROCEDURE ONLY: CPT | Performed by: NURSE PRACTITIONER

## 2020-12-29 ENCOUNTER — OFFICE VISIT (OUTPATIENT)
Dept: FAMILY MEDICINE CLINIC | Age: 40
End: 2020-12-29
Payer: COMMERCIAL

## 2020-12-29 VITALS
DIASTOLIC BLOOD PRESSURE: 80 MMHG | OXYGEN SATURATION: 98 % | TEMPERATURE: 99.2 F | HEART RATE: 82 BPM | SYSTOLIC BLOOD PRESSURE: 132 MMHG | WEIGHT: 183.1 LBS | HEIGHT: 70 IN | BODY MASS INDEX: 26.21 KG/M2

## 2020-12-29 LAB
ALBUMIN SERPL-MCNC: 4.5 G/DL (ref 3.5–5.2)
ALP BLD-CCNC: 62 U/L (ref 40–129)
ALT SERPL-CCNC: 15 U/L (ref 0–40)
ANION GAP SERPL CALCULATED.3IONS-SCNC: 15 MMOL/L (ref 7–16)
AST SERPL-CCNC: 17 U/L (ref 0–39)
BILIRUB SERPL-MCNC: 0.2 MG/DL (ref 0–1.2)
BUN BLDV-MCNC: 11 MG/DL (ref 6–20)
CALCIUM SERPL-MCNC: 9.6 MG/DL (ref 8.6–10.2)
CHLORIDE BLD-SCNC: 103 MMOL/L (ref 98–107)
CHOLESTEROL, TOTAL: 177 MG/DL (ref 0–199)
CO2: 23 MMOL/L (ref 22–29)
CREAT SERPL-MCNC: 0.9 MG/DL (ref 0.7–1.2)
GFR AFRICAN AMERICAN: >60
GFR NON-AFRICAN AMERICAN: >60 ML/MIN/1.73
GLUCOSE BLD-MCNC: 97 MG/DL (ref 74–99)
HDLC SERPL-MCNC: 45 MG/DL
LDL CHOLESTEROL CALCULATED: 105 MG/DL (ref 0–99)
POTASSIUM SERPL-SCNC: 4.2 MMOL/L (ref 3.5–5)
SODIUM BLD-SCNC: 141 MMOL/L (ref 132–146)
TOTAL PROTEIN: 6.8 G/DL (ref 6.4–8.3)
TRIGL SERPL-MCNC: 135 MG/DL (ref 0–149)
TSH SERPL DL<=0.05 MIU/L-ACNC: 2.55 UIU/ML (ref 0.27–4.2)
VLDLC SERPL CALC-MCNC: 27 MG/DL

## 2020-12-29 PROCEDURE — 4004F PT TOBACCO SCREEN RCVD TLK: CPT | Performed by: NURSE PRACTITIONER

## 2020-12-29 PROCEDURE — G8484 FLU IMMUNIZE NO ADMIN: HCPCS | Performed by: NURSE PRACTITIONER

## 2020-12-29 PROCEDURE — 99213 OFFICE O/P EST LOW 20 MIN: CPT | Performed by: NURSE PRACTITIONER

## 2020-12-29 PROCEDURE — G8427 DOCREV CUR MEDS BY ELIG CLIN: HCPCS | Performed by: NURSE PRACTITIONER

## 2020-12-29 PROCEDURE — G8419 CALC BMI OUT NRM PARAM NOF/U: HCPCS | Performed by: NURSE PRACTITIONER

## 2020-12-29 RX ORDER — ALPRAZOLAM 0.5 MG/1
0.5 TABLET ORAL NIGHTLY PRN
Qty: 30 TABLET | Refills: 0 | Status: SHIPPED
Start: 2020-12-29 | End: 2021-01-25 | Stop reason: SDUPTHER

## 2020-12-29 RX ORDER — DEXTROAMPHETAMINE SACCHARATE, AMPHETAMINE ASPARTATE MONOHYDRATE, DEXTROAMPHETAMINE SULFATE AND AMPHETAMINE SULFATE 7.5; 7.5; 7.5; 7.5 MG/1; MG/1; MG/1; MG/1
30 CAPSULE, EXTENDED RELEASE ORAL EVERY MORNING
Qty: 30 CAPSULE | Refills: 0 | Status: SHIPPED
Start: 2020-12-29 | End: 2021-01-25 | Stop reason: SDUPTHER

## 2020-12-29 RX ORDER — ALPRAZOLAM 0.5 MG/1
0.5 TABLET ORAL NIGHTLY PRN
COMMUNITY
End: 2020-12-29 | Stop reason: SDUPTHER

## 2020-12-29 RX ORDER — GABAPENTIN 800 MG/1
800 TABLET ORAL 3 TIMES DAILY
Qty: 90 TABLET | Refills: 5 | Status: SHIPPED
Start: 2020-12-29 | End: 2021-03-22 | Stop reason: SDUPTHER

## 2020-12-29 SDOH — ECONOMIC STABILITY: FOOD INSECURITY: WITHIN THE PAST 12 MONTHS, YOU WORRIED THAT YOUR FOOD WOULD RUN OUT BEFORE YOU GOT MONEY TO BUY MORE.: NOT ASKED

## 2020-12-29 SDOH — ECONOMIC STABILITY: FOOD INSECURITY: WITHIN THE PAST 12 MONTHS, THE FOOD YOU BOUGHT JUST DIDN'T LAST AND YOU DIDN'T HAVE MONEY TO GET MORE.: NOT ASKED

## 2020-12-29 SDOH — ECONOMIC STABILITY: TRANSPORTATION INSECURITY
IN THE PAST 12 MONTHS, HAS LACK OF TRANSPORTATION KEPT YOU FROM MEETINGS, WORK, OR FROM GETTING THINGS NEEDED FOR DAILY LIVING?: NOT ASKED

## 2020-12-29 SDOH — ECONOMIC STABILITY: TRANSPORTATION INSECURITY
IN THE PAST 12 MONTHS, HAS THE LACK OF TRANSPORTATION KEPT YOU FROM MEDICAL APPOINTMENTS OR FROM GETTING MEDICATIONS?: NOT ASKED

## 2020-12-29 SDOH — ECONOMIC STABILITY: INCOME INSECURITY: HOW HARD IS IT FOR YOU TO PAY FOR THE VERY BASICS LIKE FOOD, HOUSING, MEDICAL CARE, AND HEATING?: SOMEWHAT HARD

## 2020-12-29 ASSESSMENT — PATIENT HEALTH QUESTIONNAIRE - PHQ9
3. TROUBLE FALLING OR STAYING ASLEEP: 3
SUM OF ALL RESPONSES TO PHQ QUESTIONS 1-9: 21
SUM OF ALL RESPONSES TO PHQ9 QUESTIONS 1 & 2: 5
9. THOUGHTS THAT YOU WOULD BE BETTER OFF DEAD, OR OF HURTING YOURSELF: 1
4. FEELING TIRED OR HAVING LITTLE ENERGY: 3
6. FEELING BAD ABOUT YOURSELF - OR THAT YOU ARE A FAILURE OR HAVE LET YOURSELF OR YOUR FAMILY DOWN: 3
8. MOVING OR SPEAKING SO SLOWLY THAT OTHER PEOPLE COULD HAVE NOTICED. OR THE OPPOSITE, BEING SO FIGETY OR RESTLESS THAT YOU HAVE BEEN MOVING AROUND A LOT MORE THAN USUAL: 1
2. FEELING DOWN, DEPRESSED OR HOPELESS: 2
SUM OF ALL RESPONSES TO PHQ QUESTIONS 1-9: 20
10. IF YOU CHECKED OFF ANY PROBLEMS, HOW DIFFICULT HAVE THESE PROBLEMS MADE IT FOR YOU TO DO YOUR WORK, TAKE CARE OF THINGS AT HOME, OR GET ALONG WITH OTHER PEOPLE: 3
5. POOR APPETITE OR OVEREATING: 2
1. LITTLE INTEREST OR PLEASURE IN DOING THINGS: 3
SUM OF ALL RESPONSES TO PHQ QUESTIONS 1-9: 21
7. TROUBLE CONCENTRATING ON THINGS, SUCH AS READING THE NEWSPAPER OR WATCHING TELEVISION: 3

## 2020-12-29 ASSESSMENT — ENCOUNTER SYMPTOMS
TROUBLE SWALLOWING: 0
BACK PAIN: 0
NAUSEA: 0
CONSTIPATION: 0
VOMITING: 0
ABDOMINAL PAIN: 0
VOICE CHANGE: 0
DIARRHEA: 0
BLOOD IN STOOL: 0
WHEEZING: 0
CHEST TIGHTNESS: 0
SHORTNESS OF BREATH: 0
COUGH: 0

## 2020-12-29 NOTE — PATIENT INSTRUCTIONS
Patient Education        Learning About Generalized Anxiety Disorder  What is generalized anxiety disorder? We all worry. It's a normal part of life. But when you have generalized anxiety disorder, you worry about lots of things and have a hard time stopping your worry. This worry or anxiety interferes with your relationships, work, and life. What causes it? The cause is not known. But it may be passed down through families. What are the symptoms? You may feel anxious or worry most days about things like work, relationships, health, or money. You may worry about things that are unlikely to happen. You find it hard to stop or control the worry. Because you worry a lot and try hard to stop worrying, you may feel restless, tired, tense, or cranky. You may also find it hard to think or sleep. And you may have headaches or an upset stomach. How is it diagnosed? Your doctor will ask about your health and how often you worry or feel anxious. He or she may ask about other symptoms, like whether you:  · Feel restless. · Feel tired. · Have a hard time thinking or feel that your mind goes blank. · Feel cranky. · Have tense muscles. · Have sleep problems. A physical exam and tests can help make sure that your symptoms aren't caused by a different condition, such as a thyroid problem. How is it treated? Counseling and medicine can both work to treat anxiety. The two are often used along with lifestyle changes. Cognitive-behavioral therapy (CBT) is a type of counseling that's used to help treat anxiety. In CBT, you learn how to notice and replace thoughts that make you feel worried. It also can help you learn how to relax when you worry. Medicines can help. These medicines are often also used for depression. Selective serotonin reuptake inhibitors (SSRIs) are often tried first. But there are other medicines that your doctor may use. You may need to try a few medicines to find one that works well. Many people feel better by getting regular exercise, eating healthy meals, and getting good sleep. Mindfulnessfocusing on things that happen in the present momentalso can help reduce your anxiety. What can you expect when you have it? Having anxiety can be upsetting. Some people might feel less worried and stressed after a couple of months of treatment. But for other people, it might take longer to feel better. Reaching out to people for help is important. Try not to isolate yourself. Let your family and friends help you. Find someone you can trust and confide in. Talk to that person. When you know what anxiety isand how you can get help for ityou can start to learn new ways of thinking. This can help you cope and work through your anxiety. Follow-up care is a key part of your treatment and safety. Be sure to make and go to all appointments, and call your doctor if you are having problems. It's also a good idea to know your test results and keep a list of the medicines you take. Where can you learn more? Go to [email or web address]://[email or web address].[email or web address] org and sign in to your [employer] InterCivic account. Enter G110 in the [employer]ock box to learn more about \"Learning About Generalized Anxiety Disorder. \"     If you do not have an account, please click on the \"Sign Up Now\" link. Current as of: January 31, 2020               Content Version: 12.6  © 6805-0488 [employer] Mondays, Incorporated. Care instructions adapted under license by [facility] ([facility]). If you have questions about a medical condition or this instruction, always ask your healthcare professional. [place] [address] any warranty or liability for your use of this information. Patient Education        Learning About Benefits From Quitting Smoking  How does quitting smoking make you healthier? If you're thinking about quitting smoking, you may have a few reasons to be smoke-free. Your health may be one of them. · When you quit smoking, you lower your risks for cancer, lung disease, heart attack, stroke, blood vessel disease, and blindness from macular degeneration. · When you're smoke-free, you get sick less often, and you heal faster. You are less likely to get colds, flu, bronchitis, and pneumonia. · As a nonsmoker, you may find that your mood is better and you are less stressed. When and how will you feel healthier? Quitting has real health benefits that start from day 1 of being smoke-free. And the longer you stay smoke-free, the healthier you get and the better you feel. The first hours  · After just 20 minutes, your blood pressure and heart rate go down. That means there's less stress on your heart and blood vessels. · Within 12 hours, the level of carbon monoxide in your blood drops back to normal. That makes room for more oxygen. With more oxygen in your body, you may notice that you have more energy than when you smoked. After 2 weeks  · Your lungs start to work better. · Your risk of heart attack starts to drop. After 1 month  · When your lungs are clear, you cough less and breathe deeper, so it's easier to be active. · Your sense of taste and smell return. That means you can enjoy food more than you have since you started smoking. Over the years  · Over the years, your risks of heart disease, heart attack, and stroke are lower. · After 10 years, your risk of dying from lung cancer is cut by about half. And your risk for many other types of cancer is lower too. How would quitting help others in your life? When you quit smoking, you improve the health of everyone who now breathes in your smoke. · Their heart, lung, and cancer risks drop, much like yours. · They are sick less. For babies and small children, living smoke-free means they're less likely to have ear infections, pneumonia, and bronchitis. · If you're a woman who is or will be pregnant someday, quitting smoking means a healthier . · Children who are close to you are less likely to become adult smokers. Where can you learn more? Go to https://Ozura Worldmirtha.[email or web address] org and sign in to your EoeMobile account. Enter [id number] in the [facility] box to learn more about \"Learning About Benefits From Quitting Smoking. \"     If you do not have an account, please click on the \"Sign Up Now\" link. Current as of: 2020               Content Version: 12.6  © 3672-4003 [employer]ilinga, Incorporated. Care instructions adapted under license by [facility] ([facility]). If you have questions about a medical condition or this instruction, always ask your healthcare professional. [address] any warranty or liability for your use of this information.

## 2020-12-29 NOTE — PROGRESS NOTES
20  Arnetta Bamberger : 1980 Sex: male  Age: 38 y.o. Chief Complaint   Patient presents with    Hypertension     6 month check up, refills       Mayte Parker is a 48-year-old white male who has followed up on some tough times. He was once under the care of a psychotherapist but that person lost their license and since then I have been trying to manage [facility] medications but based on his current living conditions and run in with the authorities it is quite evident that I need a specialist involved in medication management. We went through his medications and there were a few medicines that he was on that were on my list that he says he is not taking I am concerned about this in the face that I feel he was maintained on an even keel when he was taking all of his medications but now that he is ran out or stopped or come off of on his own he seems to be on a slight decline. He answered some depression questions but states that he has no real plan. He denies any homicidal ideations. He stated he may have not understood the question appropriately. Review of Systems   Constitutional: Negative for activity change, chills, diaphoresis, fatigue, fever and unexpected weight change. HENT: Negative for trouble swallowing and voice change. Eyes: Negative for visual disturbance. Respiratory: Negative for cough, chest tightness, shortness of breath and wheezing. Cardiovascular: Negative for chest pain, palpitations and leg swelling. Gastrointestinal: Negative for abdominal pain, blood in stool, constipation, diarrhea, nausea and vomiting. Endocrine: Negative for polydipsia, polyphagia and polyuria. Genitourinary: Negative for dysuria, enuresis, frequency and hematuria. Musculoskeletal: Negative for arthralgias, back pain, gait problem, joint swelling, myalgias and neck stiffness. Skin: Negative for rash. Neurological: Negative for dizziness, seizures, syncope, facial asymmetry, weakness, light-headedness, numbness and headaches. Hematological: Does not bruise/bleed easily. Psychiatric/Behavioral: Negative for behavioral problems, confusion, hallucinations and suicidal ideas. The patient is not nervous/anxious. Current Outpatient Medications:     amphetamine-dextroamphetamine (ADDERALL XR) 30 MG extended release capsule, Take 1 capsule by mouth every morning for 30 days. , Disp: 30 capsule, Rfl: 0    ALPRAZolam (XANAX) 0.5 MG tablet, Take 1 tablet by mouth nightly as needed for Sleep for up to 30 days. , Disp: 30 tablet, Rfl: 0    gabapentin (NEURONTIN) 800 MG tablet, Take 1 tablet by mouth 3 times daily for 180 days. , Disp: 90 tablet, Rfl: 5    DULoxetine (CYMBALTA) 60 MG extended release capsule, Take 1 capsule by mouth 2 times daily TAKE AM DOS 02/15, Disp: 60 capsule, Rfl: 2    lamoTRIgine (LAMICTAL) 150 MG tablet, Take 1 tablet by mouth nightly, Disp: 90 tablet, Rfl: 1    ondansetron (ZOFRAN) 4 MG tablet, Take 1 tablet by mouth 3 times daily as needed for Nausea or Vomiting, Disp: 15 tablet, Rfl: 0    amLODIPine (NORVASC) 5 MG tablet, Take 1 tablet by mouth daily, Disp: 90 tablet, Rfl: 3    ibuprofen (ADVIL;MOTRIN) 800 MG tablet, Take 1 tablet by mouth every 6 hours as needed for Pain, Disp: 120 tablet, Rfl: 1    vitamin B-12 (CYANOCOBALAMIN) 1000 MCG tablet, Take 1 tablet by mouth daily (Patient not taking: Reported on 12/29/2020), Disp: 30 tablet, Rfl: 3  Allergies   Allergen Reactions    Latex Rash     condoms    Norco [Hydrocodone-Acetaminophen] Nausea Only    Pcn [Penicillins] Hives       Past Medical History:   Diagnosis Date    ADHD     Anxiety     Bipolar 1 disorder ([facility] [address]     Cephalgia 2/4/2019    ECUPXMAC(454.4)     History of kidney stones     HTN (hypertension) 2/19/2015    Hypertension     Pain induced    Neuropathy     right ulnar    Palpitation 02/19/2015 I think Taran Gely now understands that he needs to see me every 3 months no matter what. We need to get him in with [facility] so that they can help reestablish an even keel on his medications I also do not want to make any new changes to his medications. He asked me if he should be taking Xanax twice a day as opposed to once a day I was very quick to deny the request but will be open to it if a specialist recommends it. No follow-ups on file.              Seen By:  ACE Mesa - CNP

## 2021-01-25 ENCOUNTER — TELEPHONE (OUTPATIENT)
Dept: FAMILY MEDICINE CLINIC | Age: 41
End: 2021-01-25

## 2021-01-25 DIAGNOSIS — I10 ESSENTIAL HYPERTENSION: ICD-10-CM

## 2021-01-25 DIAGNOSIS — F98.8 ATTENTION DEFICIT DISORDER (ADD) WITHOUT HYPERACTIVITY: ICD-10-CM

## 2021-01-25 DIAGNOSIS — F41.9 ANXIETY: ICD-10-CM

## 2021-01-25 RX ORDER — AMLODIPINE BESYLATE 5 MG/1
TABLET ORAL
Qty: 30 TABLET | Refills: 5 | Status: SHIPPED
Start: 2021-01-25 | End: 2021-07-13

## 2021-01-25 RX ORDER — DEXTROAMPHETAMINE SACCHARATE, AMPHETAMINE ASPARTATE MONOHYDRATE, DEXTROAMPHETAMINE SULFATE AND AMPHETAMINE SULFATE 7.5; 7.5; 7.5; 7.5 MG/1; MG/1; MG/1; MG/1
30 CAPSULE, EXTENDED RELEASE ORAL EVERY MORNING
Qty: 30 CAPSULE | Refills: 0 | Status: SHIPPED
Start: 2021-01-25 | End: 2021-02-23 | Stop reason: SDUPTHER

## 2021-01-25 RX ORDER — ALPRAZOLAM 0.5 MG/1
0.5 TABLET ORAL NIGHTLY PRN
Qty: 30 TABLET | Refills: 2 | Status: SHIPPED
Start: 2021-01-25 | End: 2021-03-22 | Stop reason: SDUPTHER

## 2021-01-26 NOTE — TELEPHONE ENCOUNTER
Spoke with patient and informed him it is [employer] and I think he can just call and schedule his own appointment and if needed we can send records.

## 2021-02-16 ENCOUNTER — OFFICE VISIT (OUTPATIENT)
Dept: FAMILY MEDICINE CLINIC | Age: 41
End: 2021-02-16
Payer: COMMERCIAL

## 2021-02-16 VITALS
WEIGHT: 178.4 LBS | TEMPERATURE: 97.8 F | HEART RATE: 94 BPM | OXYGEN SATURATION: 98 % | SYSTOLIC BLOOD PRESSURE: 132 MMHG | DIASTOLIC BLOOD PRESSURE: 78 MMHG | HEIGHT: 70 IN | BODY MASS INDEX: 25.54 KG/M2

## 2021-02-16 DIAGNOSIS — F41.9 ANXIETY: ICD-10-CM

## 2021-02-16 DIAGNOSIS — Z72.0 TOBACCO USE: ICD-10-CM

## 2021-02-16 DIAGNOSIS — G44.52 NEW DAILY PERSISTENT HEADACHE: ICD-10-CM

## 2021-02-16 DIAGNOSIS — G89.29 CHRONIC BILATERAL LOW BACK PAIN WITHOUT SCIATICA: ICD-10-CM

## 2021-02-16 DIAGNOSIS — F31.9 BIPOLAR AFFECTIVE DISORDER, REMISSION STATUS UNSPECIFIED (HCC): Primary | ICD-10-CM

## 2021-02-16 DIAGNOSIS — G40.319 INTRACTABLE GENERALIZED IDIOPATHIC EPILEPSY WITHOUT STATUS EPILEPTICUS (HCC): ICD-10-CM

## 2021-02-16 DIAGNOSIS — G56.21 ULNAR NEUROPATHY AT ELBOW, RIGHT: ICD-10-CM

## 2021-02-16 DIAGNOSIS — F90.9 ATTENTION DEFICIT HYPERACTIVITY DISORDER (ADHD), UNSPECIFIED ADHD TYPE: ICD-10-CM

## 2021-02-16 DIAGNOSIS — I10 ESSENTIAL HYPERTENSION: ICD-10-CM

## 2021-02-16 DIAGNOSIS — M54.50 CHRONIC BILATERAL LOW BACK PAIN WITHOUT SCIATICA: ICD-10-CM

## 2021-02-16 PROCEDURE — 99214 OFFICE O/P EST MOD 30 MIN: CPT | Performed by: NURSE PRACTITIONER

## 2021-02-16 PROCEDURE — G8484 FLU IMMUNIZE NO ADMIN: HCPCS | Performed by: NURSE PRACTITIONER

## 2021-02-16 PROCEDURE — 4004F PT TOBACCO SCREEN RCVD TLK: CPT | Performed by: NURSE PRACTITIONER

## 2021-02-16 PROCEDURE — G8419 CALC BMI OUT NRM PARAM NOF/U: HCPCS | Performed by: NURSE PRACTITIONER

## 2021-02-16 PROCEDURE — G8427 DOCREV CUR MEDS BY ELIG CLIN: HCPCS | Performed by: NURSE PRACTITIONER

## 2021-02-16 RX ORDER — DULOXETIN HYDROCHLORIDE 60 MG/1
CAPSULE, DELAYED RELEASE ORAL
Qty: 60 CAPSULE | Refills: 5 | Status: SHIPPED
Start: 2021-02-16 | End: 2021-02-16 | Stop reason: SDUPTHER

## 2021-02-16 RX ORDER — LAMOTRIGINE 200 MG/1
200 TABLET ORAL DAILY
Qty: 30 TABLET | Refills: 5 | Status: SHIPPED
Start: 2021-02-16 | End: 2021-08-11

## 2021-02-16 RX ORDER — IBUPROFEN 800 MG/1
800 TABLET ORAL EVERY 6 HOURS PRN
Qty: 120 TABLET | Refills: 1 | Status: SHIPPED
Start: 2021-02-16 | End: 2022-02-10 | Stop reason: SDUPTHER

## 2021-02-16 RX ORDER — ONDANSETRON 4 MG/1
4 TABLET, FILM COATED ORAL 3 TIMES DAILY PRN
Qty: 45 TABLET | Refills: 0 | Status: SHIPPED | OUTPATIENT
Start: 2021-02-16 | End: 2021-03-02

## 2021-02-16 RX ORDER — DULOXETIN HYDROCHLORIDE 60 MG/1
60 CAPSULE, DELAYED RELEASE ORAL 2 TIMES DAILY
Qty: 60 CAPSULE | Refills: 5 | Status: SHIPPED
Start: 2021-02-16 | End: 2021-12-23 | Stop reason: SDUPTHER

## 2021-02-16 ASSESSMENT — ENCOUNTER SYMPTOMS
SHORTNESS OF BREATH: 0
ABDOMINAL PAIN: 0
CONSTIPATION: 0
BLOOD IN STOOL: 0
VOMITING: 0
DIARRHEA: 0
WHEEZING: 0
TROUBLE SWALLOWING: 0
COUGH: 0
CHEST TIGHTNESS: 0
NAUSEA: 0
VOICE CHANGE: 0
BACK PAIN: 0

## 2021-02-16 ASSESSMENT — PATIENT HEALTH QUESTIONNAIRE - PHQ9
SUM OF ALL RESPONSES TO PHQ QUESTIONS 1-9: 0
1. LITTLE INTEREST OR PLEASURE IN DOING THINGS: 0

## 2021-02-16 NOTE — PATIENT INSTRUCTIONS
Patient Education        Anxiety Disorder: Care Instructions  Your Care Instructions     Anxiety is a normal reaction to stress. Difficult situations can cause you to have symptoms such as sweaty palms and a nervous feeling. In an anxiety disorder, the symptoms are far more severe. Constant worry, muscle tension, trouble sleeping, nausea and diarrhea, and other symptoms can make normal daily activities difficult or impossible. These symptoms may occur for no reason, and they can affect your work, school, or social life. Medicines, counseling, and self-care can all help. Follow-up care is a key part of your treatment and safety. Be sure to make and go to all appointments, and call your doctor if you are having problems. It's also a good idea to know your test results and keep a list of the medicines you take. How can you care for yourself at home? · Take medicines exactly as directed. Call your doctor if you think you are having a problem with your medicine. · Go to your counseling sessions and follow-up appointments. · Recognize and accept your anxiety. Then, when you are in a situation that makes you anxious, say to yourself, \"This is not an emergency. I feel uncomfortable, but I am not in danger. I can keep going even if I feel anxious. \"  · Be kind to your body:  ? Relieve tension with exercise or a massage. ? Get enough rest.  ? Avoid alcohol, caffeine, nicotine, and illegal drugs. They can increase your anxiety level and cause sleep problems. ? Learn and do relaxation techniques. See below for more about these techniques. · Engage your mind. Get out and do something you enjoy. Go to a funny movie, or take a walk or hike. Plan your day. Having too much or too little to do can make you anxious. · Keep a record of your symptoms. Discuss your fears with a good friend or family member, or join a support group for people with similar problems. Talking to others sometimes relieves stress. · Get involved in social groups, or volunteer to help others. Being alone sometimes makes things seem worse than they are. · Get at least 30 minutes of exercise on most days of the week to relieve stress. Walking is a good choice. You also may want to do other activities, such as running, swimming, cycling, or playing tennis or team sports. Relaxation techniques  Do relaxation exercises 10 to 20 minutes a day. You can play soothing, relaxing music while you do them, if you wish. · Tell others in your house that you are going to do your relaxation exercises. Ask them not to disturb you. · Find a comfortable place, away from all distractions and noise. · Lie down on your back, or sit with your back straight. · Focus on your breathing. Make it slow and steady. · Breathe in through your nose. Breathe out through either your nose or mouth. · Breathe deeply, filling up the area between your navel and your rib cage. Breathe so that your belly goes up and down. · Do not hold your breath. · Breathe like this for 5 to 10 minutes. Notice the feeling of calmness throughout your whole body. As you continue to breathe slowly and deeply, relax by doing the following for another 5 to 10 minutes:  · Tighten and relax each muscle group in your body. You can begin at your toes and work your way up to your head. · Imagine your muscle groups relaxing and becoming heavy. · Empty your mind of all thoughts. · Let yourself relax more and more deeply. · Become aware of the state of calmness that surrounds you. · When your relaxation time is over, you can bring yourself back to alertness by moving your fingers and toes and then your hands and feet and then stretching and moving your entire body. Sometimes people fall asleep during relaxation, but they usually wake up shortly afterward. · Always give yourself time to return to full alertness before you drive a car or do anything that might cause an accident if you are not fully alert. Never play a relaxation tape while you drive a car. When should you call for help? Call 911 anytime you think you may need emergency care. For example, call if:    · You feel you cannot stop from hurting yourself or someone else. Keep the numbers for these national suicide hotlines: 0-211-102-TALK (6-925.673.2847) and 5-031-QJEZMXQ (2-275.553.7505). If you or someone you know talks about suicide or feeling hopeless, get help right away. Watch closely for changes in your health, and be sure to contact your doctor if:    · You have anxiety or fear that affects your life.     · You have symptoms of anxiety that are new or different from those you had before. Where can you learn more? Go to https://Dreamweaver International.IdeaForest. org and sign in to your FlashSoft account. Enter P754 in the [facility] box to learn more about \"Anxiety Disorder: Care Instructions. \"     If you do not have an account, please click on the \"Sign Up Now\" link. Current as of: January 31, 2020               Content Version: 12.6  © 7115-2950 The Meishijie website, Incorporated. Care instructions adapted under license by [facility] ([facility]). If you have questions about a medical condition or this instruction, always ask your healthcare professional. [place] [address] any warranty or liability for your use of this information. Patient Education        Bipolar Disorder: Care Instructions  Your Care Instructions     Bipolar disorder is an illness that causes extreme mood changes, from times of very high energy (manic episodes) to times of depression. But many people with bipolar disorder show only the symptoms of depression. These moods may cause problems with your work, school, family life, friendships, and how well you function. This disease is also called manic-depression. There is no cure for bipolar disorder, but it can be helped with medicines. Counseling may also help. It is important to take your medicines exactly as prescribed, even when you feel well. You may need lifelong treatment. Follow-up care is a key part of your treatment and safety. Be sure to make and go to all appointments, and call your doctor if you are having problems. It's also a good idea to know your test results and keep a list of the medicines you take. How can you care for yourself at home? · Be safe with medicines. Take your medicines exactly as prescribed. Do not stop or change a medicine without talking to your doctor first. Jay Skinner and your doctor may need to try different combinations of medicines to find what works for you. · Take your medicines on schedule to keep your moods even. When you feel good, you may think that you do not need your medicines. But it is important to keep taking them. · Go to your counseling sessions. Call and talk with your counselor if you can't go to a session or if you don't think the sessions are helping. Do not just stop going. · Get at least 30 minutes of activity on most days of the week. Walking is a good choice. You also may want to do other things, such as running, swimming, or cycling. · Get enough sleep. Keep your room dark and quiet. Try to go to bed at the same time every night. · Eat a healthy diet. This includes whole grains, dairy, fruits, vegetables, and protein. Eat foods from each of these groups. · Try to lower your stress. Manage your time, build a strong support system, and lead a healthy lifestyle. To lower your stress, try physical activity, slow deep breathing, or getting a massage. · Do not use alcohol, marijuana, or illegal drugs. · Learn the early signs of your mood changes. You can then take steps to help yourself feel better. · Ask for help from friends and family when you need it. You may need help with daily chores when you are depressed. When you are manic, you may need support to control your high energy levels. What should you do if someone in your family has bipolar disorder? · Learn about the disease and signs it's getting worse. · Remind your family member you love them. · Make a plan with all family members about how to take care of your loved one when symptoms are bad. · Remind yourself it will take time for changes to occur. · Try not to blame yourself for the disease. · Know your legal rights and the legal rights of your family member. Support groups or counselors can help with this information. · Take care of yourself. Keep up with your interests, such as career, hobbies, and friends. Use exercise, positive self-talk, deep breathing, and other relaxing exercises to help lower your stress. · Give yourself time to grieve. You may need to deal with emotions such as anger, fear, and frustration. · If you are having a hard time with your feelings or with your relationship with your family member, talk with a counselor. When should you call for help? Call 911 anytime you think you may need emergency care. For example, call if:    · You feel like hurting yourself or someone else.     · Someone who has bipolar disorder displays dangerous behavior, and you think the person might hurt himself or herself or someone else. Call your doctor now or seek immediate medical care if:    · You hear voices.     · Someone you know has bipolar disorder and talks about suicide. Keep the numbers for these national suicide hotlines: 2-199-120-TALK (3-242.466.4699) and 4-255-FXLRFHQ (1-150.147.5052).  If a suicide threat seems real, with a specific plan and a way to carry it out, stay with the person, or ask someone you trust to stay with the person, until you can get help.     · Someone you know has bipolar disorder and: ? Starts to give away possessions. ? Is using illegal drugs or drinking alcohol heavily. ? Talks or writes about death, including writing suicide notes or talking about guns, knives, or pills. ? Talks or writes about hurting someone else. ? Starts to spend a lot of time alone. ? Acts very aggressively or suddenly appears calm. ? Talks about beliefs that are not based in reality (delusions). Watch closely for changes in your health, and be sure to contact your doctor if:    · You cannot go to your counseling sessions. Where can you learn more? Go to https://chpepiceweb.[email or web address]. org and sign in to your Peeractive account. Enter K052 in the [employer] box to learn more about \"Bipolar Disorder: Care Instructions. \"     If you do not have an account, please click on the \"Sign Up Now\" link. Current as of: January 31, 2020               Content Version: 12.6  © 6318-1830 [employer], Incorporated. Care instructions adapted under license by [facility] ([facility]). If you have questions about a medical condition or this instruction, always ask your healthcare professional. [place] [address] any warranty or liability for your use of this information.

## 2021-02-16 NOTE — PROGRESS NOTES
20  Lesli Hairston : 1980 Sex: male  Age: 38 y.o. Chief Complaint   Patient presents with    Other     Pt said he is having a jolt in the brain every 15-30 seconds. No pain associated with it. Pt takes Lamictal so he doesn't know if it needs increased. This has been going on for 4 days.  Other     Pt said he has had an EEG and another test on brain but he doesn't know exactly when. He thinks it was 2015. Feels like he is having static in his head feeling a SZ like presentation he has had in the past and was placed on Lamictal for it. After they put him on the Lamictal for it three years ago it got better but he was under the care of a neurologist.  Wants to go up on the medications prior to tyring to get in with a specialist.      Women's describing a static almost electrical feeling throughout his whole head episodic feels like when he was first diagnosed with epilepsy and placed on Lamictal which made his symptoms go away. He thinks it may be his Lamictal dose is too low and needs to get increased. He has not seen a neurologist for some time but his last MRI of the brain was in 2017 and was unremarkable. We do not have any of the neurology notes. Patient is unemployed and does not have any form of transportation so I have been trying to help out with keeping him on his medications until we can get him in with a specialist.    Samir Sommer is a 50-year-old white male who has followed up on some tough times. He was once under the care of a psychotherapist but that person lost their license and since then I have been trying to manage Tolland Hausen medications but based on his current living conditions and run in with the authorities it is quite evident that I need a specialist involved in medication management.     We went through his medications and there were a few medicines that he was on that were on my list that he says he is not taking I am concerned about this in the face that I feel he was maintained on an even keel when he was taking all of his medications but now that he is ran out or stopped or come off of on his own he seems to be on a slight decline. He answered some depression questions but states that he has no real plan. He denies any homicidal ideations. He stated he may have not understood the question appropriately. Other  Pertinent negatives include no abdominal pain, arthralgias, chest pain, chills, coughing, diaphoresis, fatigue, fever, headaches, joint swelling, myalgias, nausea, numbness, rash, vomiting or weakness. Review of Systems   Constitutional: Negative for activity change, chills, diaphoresis, fatigue, fever and unexpected weight change. HENT: Negative for trouble swallowing and voice change. Eyes: Negative for visual disturbance. Respiratory: Negative for cough, chest tightness, shortness of breath and wheezing. Cardiovascular: Negative for chest pain, palpitations and leg swelling. Gastrointestinal: Negative for abdominal pain, blood in stool, constipation, diarrhea, nausea and vomiting. Endocrine: Negative for polydipsia, polyphagia and polyuria. Genitourinary: Negative for dysuria, enuresis, frequency and hematuria. Musculoskeletal: Negative for arthralgias, back pain, gait problem, joint swelling, myalgias and neck stiffness. Skin: Negative for rash. Neurological: Negative for dizziness, seizures, syncope, facial asymmetry, weakness, light-headedness, numbness and headaches. Hematological: Does not bruise/bleed easily. Psychiatric/Behavioral: Negative for behavioral problems, confusion, hallucinations and suicidal ideas. The patient is not nervous/anxious.           Current Outpatient Medications:     DULoxetine (CYMBALTA) 60 MG extended release capsule, Take 1 capsule by mouth 2 times daily, Disp: 60 capsule, Rfl: 5    ondansetron (ZOFRAN) 4 MG tablet, Take 1 tablet by mouth 3 times daily as needed for Nausea or Vomiting, Disp: 45 tablet, Rfl: 0    ibuprofen (ADVIL;MOTRIN) 800 MG tablet, Take 1 tablet by mouth every 6 hours as needed for Pain, Disp: 120 tablet, Rfl: 1    lamoTRIgine (LAMICTAL) 200 MG tablet, Take 1 tablet by mouth daily, Disp: 30 tablet, Rfl: 5    amLODIPine (NORVASC) 5 MG tablet, take 1 tablet by mouth once daily, Disp: 30 tablet, Rfl: 5    ALPRAZolam (XANAX) 0.5 MG tablet, Take 1 tablet by mouth nightly as needed for Sleep for up to 30 days. , Disp: 30 tablet, Rfl: 2    amphetamine-dextroamphetamine (ADDERALL XR) 30 MG extended release capsule, Take 1 capsule by mouth every morning for 30 days. , Disp: 30 capsule, Rfl: 0    gabapentin (NEURONTIN) 800 MG tablet, Take 1 tablet by mouth 3 times daily for 180 days. , Disp: 90 tablet, Rfl: 5  Allergies   Allergen Reactions    Latex Rash     condoms    Norco [Hydrocodone-Acetaminophen] Nausea Only    Pcn [Penicillins] Hives       Past Medical History:   Diagnosis Date    ADHD     Anxiety     Bipolar 1 disorder ([facility] [address]     Cephalgia 2/4/2019    CSTIKCDR(668.6)     History of kidney stones     HTN (hypertension) 2/19/2015    Hypertension     Pain induced    Neuropathy     right ulnar    Palpitation 02/19/2015    follows Dr Anju Villa    Right carpal tunnel syndrome         Vitals:    02/16/21 1151   BP: 132/78   Site: Left Upper Arm   Position: Sitting   Cuff Size: Medium Adult   Pulse: 94   Temp: 97.8 °F (36.6 °C)   TempSrc: Temporal   SpO2: 98%   Weight: 178 lb 6.4 oz (80.9 kg)   Height: 5' 10\" (1.778 m)       Physical Exam  Constitutional:       Appearance: Normal appearance. HENT:      Head: Normocephalic. Nose: Nose normal.   Eyes:      Pupils: Pupils are equal, round, and reactive to light. Cardiovascular:      Rate and Rhythm: Normal rate and regular rhythm. Pulmonary:      Effort: Pulmonary effort is normal.   Abdominal:      General: Abdomen is flat. Palpations: Abdomen is soft.    Musculoskeletal: Normal range of motion. Skin:     General: Skin is warm and dry. Neurological:      General: No focal deficit present. Mental Status: He is alert. Mental status is at baseline. Psychiatric:         Mood and Affect: Mood normal.         Assessment and Plan:    Bisi Rivers was seen today for other and other. Diagnoses and all orders for this visit:    Bipolar affective disorder, remission status unspecified (HCC)    Ulnar neuropathy at elbow, right  -     ibuprofen (ADVIL;MOTRIN) 800 MG tablet; Take 1 tablet by mouth every 6 hours as needed for Pain    New daily persistent headache    Essential hypertension    Chronic bilateral low back pain without sciatica    Tobacco use    Attention deficit hyperactivity disorder (ADHD), unspecified ADHD type    Anxiety    Intractable generalized idiopathic epilepsy without status epilepticus ([facility] [address]    Other orders  -     DULoxetine (CYMBALTA) 60 MG extended release capsule; Take 1 capsule by mouth 2 times daily  -     ondansetron (ZOFRAN) 4 MG tablet; Take 1 tablet by mouth 3 times daily as needed for Nausea or Vomiting  -     lamoTRIgine (LAMICTAL) 200 MG tablet; Take 1 tablet by mouth daily        We need to get him in with [facility] so that they can help reestablish an even keel on his medications I also do not want to make any new changes to his medications. Does not have transportation and went to the counseling center and has an appointment Thursday with them. I need notes     Recommend he see neurologist for possible eval and treatment  LAST MRI of the brain in 2017 was negative for any ominous findings. He asked me if he should be taking Xanax twice a day as opposed to once a day I was very quick to deny the request but will be open to it if a specialist recommends it. Return in about 6 weeks (around 4/1/2021).              Seen By:  ACE Taveras - CNP

## 2021-02-23 DIAGNOSIS — F98.8 ATTENTION DEFICIT DISORDER (ADD) WITHOUT HYPERACTIVITY: ICD-10-CM

## 2021-02-23 RX ORDER — DEXTROAMPHETAMINE SACCHARATE, AMPHETAMINE ASPARTATE MONOHYDRATE, DEXTROAMPHETAMINE SULFATE AND AMPHETAMINE SULFATE 7.5; 7.5; 7.5; 7.5 MG/1; MG/1; MG/1; MG/1
30 CAPSULE, EXTENDED RELEASE ORAL EVERY MORNING
Qty: 30 CAPSULE | Refills: 0 | Status: SHIPPED
Start: 2021-02-23 | Stop reason: SDUPTHER

## 2021-03-22 DIAGNOSIS — F41.9 ANXIETY: ICD-10-CM

## 2021-03-22 DIAGNOSIS — F98.8 ATTENTION DEFICIT DISORDER (ADD) WITHOUT HYPERACTIVITY: ICD-10-CM

## 2021-03-22 RX ORDER — ALPRAZOLAM 0.5 MG/1
0.5 TABLET ORAL NIGHTLY PRN
Qty: 30 TABLET | Refills: 2 | Status: SHIPPED
Start: 2021-03-22 | End: 2021-04-22 | Stop reason: SDUPTHER

## 2021-03-22 RX ORDER — GABAPENTIN 800 MG/1
800 TABLET ORAL 3 TIMES DAILY
Qty: 90 TABLET | Refills: 5 | Status: SHIPPED
Start: 2021-03-22 | End: 2021-04-22 | Stop reason: SDUPTHER

## 2021-03-22 RX ORDER — DEXTROAMPHETAMINE SACCHARATE, AMPHETAMINE ASPARTATE MONOHYDRATE, DEXTROAMPHETAMINE SULFATE AND AMPHETAMINE SULFATE 7.5; 7.5; 7.5; 7.5 MG/1; MG/1; MG/1; MG/1
30 CAPSULE, EXTENDED RELEASE ORAL EVERY MORNING
Qty: 30 CAPSULE | Refills: 0 | Status: SHIPPED
Start: 2021-03-22 | End: 2021-03-24 | Stop reason: SDUPTHER

## 2021-03-24 DIAGNOSIS — F98.8 ATTENTION DEFICIT DISORDER (ADD) WITHOUT HYPERACTIVITY: ICD-10-CM

## 2021-03-24 RX ORDER — DEXTROAMPHETAMINE SACCHARATE, AMPHETAMINE ASPARTATE MONOHYDRATE, DEXTROAMPHETAMINE SULFATE AND AMPHETAMINE SULFATE 7.5; 7.5; 7.5; 7.5 MG/1; MG/1; MG/1; MG/1
30 CAPSULE, EXTENDED RELEASE ORAL EVERY MORNING
Qty: 30 CAPSULE | Refills: 0 | Status: SHIPPED
Start: 2021-03-24 | End: 2021-04-22 | Stop reason: SDUPTHER

## 2021-04-15 RX ORDER — LAMOTRIGINE 150 MG/1
TABLET ORAL
Qty: 90 TABLET | Refills: 1 | Status: SHIPPED
Start: 2021-04-15 | End: 2021-04-22

## 2021-04-22 ENCOUNTER — OFFICE VISIT (OUTPATIENT)
Dept: FAMILY MEDICINE CLINIC | Age: 41
End: 2021-04-22
Payer: COMMERCIAL

## 2021-04-22 VITALS
OXYGEN SATURATION: 99 % | TEMPERATURE: 99.1 F | HEART RATE: 82 BPM | WEIGHT: 186.6 LBS | HEIGHT: 70 IN | SYSTOLIC BLOOD PRESSURE: 118 MMHG | DIASTOLIC BLOOD PRESSURE: 78 MMHG | BODY MASS INDEX: 26.71 KG/M2

## 2021-04-22 DIAGNOSIS — F41.9 ANXIETY: ICD-10-CM

## 2021-04-22 DIAGNOSIS — G40.319 INTRACTABLE GENERALIZED IDIOPATHIC EPILEPSY WITHOUT STATUS EPILEPTICUS (HCC): Primary | ICD-10-CM

## 2021-04-22 DIAGNOSIS — F98.8 ATTENTION DEFICIT DISORDER (ADD) WITHOUT HYPERACTIVITY: ICD-10-CM

## 2021-04-22 DIAGNOSIS — I10 ESSENTIAL HYPERTENSION: ICD-10-CM

## 2021-04-22 DIAGNOSIS — G44.52 NEW DAILY PERSISTENT HEADACHE: ICD-10-CM

## 2021-04-22 DIAGNOSIS — R00.2 PALPITATION: ICD-10-CM

## 2021-04-22 DIAGNOSIS — Z72.0 TOBACCO USE: ICD-10-CM

## 2021-04-22 DIAGNOSIS — F90.9 ATTENTION DEFICIT HYPERACTIVITY DISORDER (ADHD), UNSPECIFIED ADHD TYPE: ICD-10-CM

## 2021-04-22 DIAGNOSIS — Z98.890 HISTORY OF LUMBAR LAMINECTOMY: ICD-10-CM

## 2021-04-22 DIAGNOSIS — F31.9 BIPOLAR AFFECTIVE DISORDER, REMISSION STATUS UNSPECIFIED (HCC): ICD-10-CM

## 2021-04-22 PROCEDURE — G8419 CALC BMI OUT NRM PARAM NOF/U: HCPCS | Performed by: NURSE PRACTITIONER

## 2021-04-22 PROCEDURE — 99214 OFFICE O/P EST MOD 30 MIN: CPT | Performed by: NURSE PRACTITIONER

## 2021-04-22 PROCEDURE — 4004F PT TOBACCO SCREEN RCVD TLK: CPT | Performed by: NURSE PRACTITIONER

## 2021-04-22 PROCEDURE — G8427 DOCREV CUR MEDS BY ELIG CLIN: HCPCS | Performed by: NURSE PRACTITIONER

## 2021-04-22 RX ORDER — ALPRAZOLAM 0.5 MG/1
0.5 TABLET ORAL NIGHTLY PRN
Qty: 30 TABLET | Refills: 2 | Status: SHIPPED
Start: 2021-04-22 | End: 2021-06-16 | Stop reason: SDUPTHER

## 2021-04-22 RX ORDER — DEXTROAMPHETAMINE SACCHARATE, AMPHETAMINE ASPARTATE MONOHYDRATE, DEXTROAMPHETAMINE SULFATE AND AMPHETAMINE SULFATE 7.5; 7.5; 7.5; 7.5 MG/1; MG/1; MG/1; MG/1
30 CAPSULE, EXTENDED RELEASE ORAL EVERY MORNING
Qty: 30 CAPSULE | Refills: 0 | Status: SHIPPED
Start: 2021-04-22 | End: 2021-05-19 | Stop reason: SDUPTHER

## 2021-04-22 RX ORDER — GABAPENTIN 800 MG/1
800 TABLET ORAL 3 TIMES DAILY
Qty: 90 TABLET | Refills: 5 | Status: SHIPPED
Start: 2021-04-22 | End: 2021-07-14 | Stop reason: SDUPTHER

## 2021-04-22 ASSESSMENT — ENCOUNTER SYMPTOMS
TROUBLE SWALLOWING: 0
VOICE CHANGE: 0
BLOOD IN STOOL: 0
VOMITING: 0
NAUSEA: 0
COUGH: 0
CHEST TIGHTNESS: 0
CONSTIPATION: 0
BACK PAIN: 0
ABDOMINAL PAIN: 0
WHEEZING: 0
SHORTNESS OF BREATH: 0
DIARRHEA: 0

## 2021-04-22 NOTE — PATIENT INSTRUCTIONS
Patient Education        Epilepsy: Care Instructions  Your Care Instructions     Epilepsy is a common condition that causes repeated seizures. The seizures are caused by bursts of electrical activity in the brain that aren't normal. Seizures may cause problems with muscle control, movement, speech, vision, or awareness. They can be scary. Epilepsy affects each person differently. Some people have only a few seizures. Others get them more often. If you know what triggers a seizure, you may be able to avoid having one. You can take medicines to control and reduce seizures. You and your doctor will need to find the right combination, schedule, and dose of medicine. This may take time and careful changes. Seizures may get worse and happen more often over time. Follow-up care is a key part of your treatment and safety. Be sure to make and go to all appointments, and call your doctor if you are having problems. It's also a good idea to know your test results and keep a list of the medicines you take. How can you care for yourself at home? To control your seizures, you need to follow your treatment plan. If you take medicine to control seizures, you must take it exactly as prescribed. The medicine works only if you take the right amount on the schedule your doctor sets up. Following this schedule keeps the right level of medicine in your body. Even missing just a few doses can allow seizures to happen. You might be on a special ketogenic diet. If so, you'll need to follow the diet exactly for it to help prevent seizures. As you follow your treatment plan, also try to figure out and avoid things that may make you more likely to have a seizure. These may include:  · Not getting enough sleep. · Using drugs or alcohol. · Being stressed. · Skipping meals. If you keep having seizures despite treatment, keep a record of them. Note the date, time of day, and any details about the seizure that you can remember.  Your doctor can use this information to plan or adjust your medicine or other treatment. The record can also help your doctor find out what kinds of seizures you are having. If you have epilepsy:  · Be sure that any doctor who treats you knows that you have epilepsy. And let the doctor know what medicines you take, if any. · Wear a medical ID bracelet. If you have a seizure or accident that leaves you unconscious or unable to speak for yourself, the bracelet will let those who are treating you know that you have epilepsy. It will also list any medicines you take to control your seizures. That way, you won't be given any medicines that will react badly with those already in your body. · Ask your doctor if it's safe for you to do things like drive or swim. · Create a seizure first-aid plan with your friends and family. The plan will help them know how to help you. The kind of plan you need can depend on the kind of seizures you have. Your doctor can tell you more about this. When should you call for help? Call 911 anytime you think you may need emergency care. For example, call if:    · A seizure does not stop as it normally does.     · You have new symptoms such as:  ? Numbness, tingling, or weakness on one side of your body or face. ? Vision changes. ? Trouble speaking or thinking clearly. Call your doctor now or seek immediate medical care if:    · You have a fever.     · You have a severe headache. Watch closely for changes in your health, and be sure to contact your doctor if:    · The normal pattern or features of your seizures change. Where can you learn more? Go to [email or web address] org and sign in to your ATOMOO account. Enter V288 in the Kyles[employer]etitor box to learn more about \"Epilepsy: Care Instructions. \"     If you do not have an account, please click on the \"Sign Up Now\" link.   Current as of: August 4, 2020               Content Version: 12.8  © 4271-6542 Healthwise, Incorporated. Care instructions adapted under license by [facility] ([facility]). If you have questions about a medical condition or this instruction, always ask your healthcare professional. [address] any warranty or liability for your use of this information. Patient Education        Anxiety Disorder: Care Instructions  Your Care Instructions     Anxiety is a normal reaction to stress. Difficult situations can cause you to have symptoms such as sweaty palms and a nervous feeling. In an anxiety disorder, the symptoms are far more severe. Constant worry, muscle tension, trouble sleeping, nausea and diarrhea, and other symptoms can make normal daily activities difficult or impossible. These symptoms may occur for no reason, and they can affect your work, school, or social life. Medicines, counseling, and self-care can all help. Follow-up care is a key part of your treatment and safety. Be sure to make and go to all appointments, and call your doctor if you are having problems. It's also a good idea to know your test results and keep a list of the medicines you take. How can you care for yourself at home? · Take medicines exactly as directed. Call your doctor if you think you are having a problem with your medicine. · Go to your counseling sessions and follow-up appointments. · Recognize and accept your anxiety. Then, when you are in a situation that makes you anxious, say to yourself, \"This is not an emergency. I feel uncomfortable, but I am not in danger. I can keep going even if I feel anxious. \"  · Be kind to your body:  ? Relieve tension with exercise or a massage. ? Get enough rest.  ? Avoid alcohol, caffeine, nicotine, and illegal drugs. They can increase your anxiety level and cause sleep problems. ? Learn and do relaxation techniques. See below for more about these techniques. · Engage your mind. Get out and do something you enjoy.  Go to a funny movie, or take a walk or hike. Plan your day. Having too much or too little to do can make you anxious. · Keep a record of your symptoms. Discuss your fears with a good friend or family member, or join a support group for people with similar problems. Talking to others sometimes relieves stress. · Get involved in social groups, or volunteer to help others. Being alone sometimes makes things seem worse than they are. · Get at least 30 minutes of exercise on most days of the week to relieve stress. Walking is a good choice. You also may want to do other activities, such as running, swimming, cycling, or playing tennis or team sports. Relaxation techniques  Do relaxation exercises 10 to 20 minutes a day. You can play soothing, relaxing music while you do them, if you wish. · Tell others in your house that you are going to do your relaxation exercises. Ask them not to disturb you. · Find a comfortable place, away from all distractions and noise. · Lie down on your back, or sit with your back straight. · Focus on your breathing. Make it slow and steady. · Breathe in through your nose. Breathe out through either your nose or mouth. · Breathe deeply, filling up the area between your navel and your rib cage. Breathe so that your belly goes up and down. · Do not hold your breath. · Breathe like this for 5 to 10 minutes. Notice the feeling of calmness throughout your whole body. As you continue to breathe slowly and deeply, relax by doing the following for another 5 to 10 minutes:  · Tighten and relax each muscle group in your body. You can begin at your toes and work your way up to your head. · Imagine your muscle groups relaxing and becoming heavy. · Empty your mind of all thoughts. · Let yourself relax more and more deeply. · Become aware of the state of calmness that surrounds you.   · When your relaxation time is over, you can bring yourself back to alertness by moving your fingers and toes and then your hands and feet moods may cause problems with your work, school, family life, friendships, and how well you function. This disease is also called manic-depression. There is no cure for bipolar disorder, but it can be helped with medicines. Counseling may also help. It is important to take your medicines exactly as prescribed, even when you feel well. You may need lifelong treatment. Follow-up care is a key part of your treatment and safety. Be sure to make and go to all appointments, and call your doctor if you are having problems. It's also a good idea to know your test results and keep a list of the medicines you take. How can you care for yourself at home? · Be safe with medicines. Take your medicines exactly as prescribed. Do not stop or change a medicine without talking to your doctor first. Joshua Bradshaw and your doctor may need to try different combinations of medicines to find what works for you. · Take your medicines on schedule to keep your moods even. When you feel good, you may think that you do not need your medicines. But it is important to keep taking them. · Go to your counseling sessions. Call and talk with your counselor if you can't go to a session or if you don't think the sessions are helping. Do not just stop going. · Get at least 30 minutes of activity on most days of the week. Walking is a good choice. You also may want to do other things, such as running, swimming, or cycling. · Get enough sleep. Keep your room dark and quiet. Try to go to bed at the same time every night. · Eat a healthy diet. This includes whole grains, dairy, fruits, vegetables, and protein. Eat foods from each of these groups. · Try to lower your stress. Manage your time, build a strong support system, and lead a healthy lifestyle. To lower your stress, try physical activity, slow deep breathing, or getting a massage. · Do not use alcohol, marijuana, or illegal drugs. · Learn the early signs of your mood changes.  You can then take steps to help yourself feel better. · Ask for help from friends and family when you need it. You may need help with daily chores when you are depressed. When you are manic, you may need support to control your high energy levels. What should you do if someone in your family has bipolar disorder? · Learn about the disease and signs it's getting worse. · Remind your family member you love them. · Make a plan with all family members about how to take care of your loved one when symptoms are bad. · Remind yourself it will take time for changes to occur. · Try not to blame yourself for the disease. · Know your legal rights and the legal rights of your family member. Support groups or counselors can help with this information. · Take care of yourself. Keep up with your interests, such as career, hobbies, and friends. Use exercise, positive self-talk, deep breathing, and other relaxing exercises to help lower your stress. · Give yourself time to grieve. You may need to deal with emotions such as anger, fear, and frustration. · If you are having a hard time with your feelings or with your relationship with your family member, talk with a counselor. When should you call for help? Call 911 anytime you think you may need emergency care. For example, call if:    · You feel like hurting yourself or someone else.     · Someone who has bipolar disorder displays dangerous behavior, and you think the person might hurt himself or herself or someone else. Call your doctor now or seek immediate medical care if:    · You hear voices.     · Someone you know has bipolar disorder and talks about suicide. Keep the numbers for these national suicide hotlines: 3-388-109-TALK (2-851.178.8507) and 3-903-ABNGVYV (4-531.761.5275).  If a suicide threat seems real, with a specific plan and a way to carry it out, stay with the person, or ask someone you trust to stay with the person, until you can get help.     · Someone you know has bipolar disorder and:  ? Starts to give away possessions. ? Is using illegal drugs or drinking alcohol heavily. ? Talks or writes about death, including writing suicide notes or talking about guns, knives, or pills. ? Talks or writes about hurting someone else. ? Starts to spend a lot of time alone. ? Acts very aggressively or suddenly appears calm. ? Talks about beliefs that are not based in reality (delusions). Watch closely for changes in your health, and be sure to contact your doctor if:    · You cannot go to your counseling sessions. Where can you learn more? Go to [email or web address] and sign in to your Aplica account. Enter K052 in the EPV SOLAR box to learn more about \"Bipolar Disorder: Care Instructions. \"     If you do not have an account, please click on the \"Sign Up Now\" link. Current as of: September 23, 2020               Content Version: 12.8  © 2006-2021 Healthwise, Incorporated. Care instructions adapted under license by [facility] ([facility]). If you have questions about a medical condition or this instruction, always ask your healthcare professional. [place] [address] any warranty or liability for your use of this information.

## 2021-04-22 NOTE — PROGRESS NOTES
of on his own he seems to be on a slight decline. He answered some depression questions but states that he has no real plan. He denies any homicidal ideations. He stated he may have not understood the question appropriately. Other  Pertinent negatives include no abdominal pain, arthralgias, chest pain, chills, coughing, diaphoresis, fatigue, fever, headaches, joint swelling, myalgias, nausea, numbness, rash, vomiting or weakness. Hypertension  Pertinent negatives include no chest pain, headaches, palpitations or shortness of breath. Review of Systems   Constitutional: Negative for activity change, chills, diaphoresis, fatigue, fever and unexpected weight change. HENT: Negative for trouble swallowing and voice change. Eyes: Negative for visual disturbance. Respiratory: Negative for cough, chest tightness, shortness of breath and wheezing. Cardiovascular: Negative for chest pain, palpitations and leg swelling. Gastrointestinal: Negative for abdominal pain, blood in stool, constipation, diarrhea, nausea and vomiting. Endocrine: Negative for polydipsia, polyphagia and polyuria. Genitourinary: Negative for dysuria, enuresis, frequency and hematuria. Musculoskeletal: Negative for arthralgias, back pain, gait problem, joint swelling, myalgias and neck stiffness. Skin: Negative for rash. Neurological: Negative for dizziness, seizures, syncope, facial asymmetry, weakness, light-headedness, numbness and headaches. Hematological: Does not bruise/bleed easily. Psychiatric/Behavioral: Negative for behavioral problems, confusion, hallucinations and suicidal ideas. The patient is not nervous/anxious. Current Outpatient Medications:     ALPRAZolam (XANAX) 0.5 MG tablet, Take 1 tablet by mouth nightly as needed for Sleep for up to 90 days. , Disp: 30 tablet, Rfl: 2    amphetamine-dextroamphetamine (ADDERALL XR) 30 MG extended release capsule, Take 1 capsule by mouth every morning Psychiatric:         Mood and Affect: Mood normal.         Assessment and Plan:    Kori Dee was seen today for anxiety and hypertension. Diagnoses and all orders for this visit:    Intractable generalized idiopathic epilepsy without status epilepticus ([address]    Attention deficit disorder (ADD) without hyperactivity  -     ALPRAZolam (XANAX) 0.5 MG tablet; Take 1 tablet by mouth nightly as needed for Sleep for up to 90 days. -     amphetamine-dextroamphetamine (ADDERALL XR) 30 MG extended release capsule; Take 1 capsule by mouth every morning for 30 days. Anxiety  -     ALPRAZolam (XANAX) 0.5 MG tablet; Take 1 tablet by mouth nightly as needed for Sleep for up to 90 days. New daily persistent headache    History of lumbar laminectomy/discectomy L4-5, left    Palpitation    Essential hypertension    Tobacco use    Bipolar affective disorder, remission status unspecified (HCC)    Attention deficit hyperactivity disorder (ADHD), unspecified ADHD type    Other orders  -     gabapentin (NEURONTIN) 800 MG tablet; Take 1 tablet by mouth 3 times daily for 180 days. Recommend he see neurologist for possible eval and treatment  LAST MRI of the brain in 2017 was negative for any ominous findings. The increase ins dose improved versus resolved his symptoms so will wait due to the Transportation issue on a neurologist for now. Is see a therapist who is doing good work with him    He asked me if he should be taking Xanax twice a day as opposed to once a day I was very quick to deny the request but will be open to it if a specialist recommends it. Still no reports of change needed from the specialist    Return in about 3 months (around 7/22/2021).              Seen By:  ACE Chavez - CNP

## 2021-05-19 DIAGNOSIS — F98.8 ATTENTION DEFICIT DISORDER (ADD) WITHOUT HYPERACTIVITY: ICD-10-CM

## 2021-05-19 RX ORDER — DEXTROAMPHETAMINE SACCHARATE, AMPHETAMINE ASPARTATE MONOHYDRATE, DEXTROAMPHETAMINE SULFATE AND AMPHETAMINE SULFATE 7.5; 7.5; 7.5; 7.5 MG/1; MG/1; MG/1; MG/1
30 CAPSULE, EXTENDED RELEASE ORAL EVERY MORNING
Qty: 30 CAPSULE | Refills: 0 | Status: SHIPPED
Start: 2021-05-19 | End: 2021-06-16 | Stop reason: SDUPTHER

## 2021-06-16 DIAGNOSIS — F98.8 ATTENTION DEFICIT DISORDER (ADD) WITHOUT HYPERACTIVITY: ICD-10-CM

## 2021-06-16 DIAGNOSIS — F41.9 ANXIETY: ICD-10-CM

## 2021-06-16 RX ORDER — DEXTROAMPHETAMINE SACCHARATE, AMPHETAMINE ASPARTATE MONOHYDRATE, DEXTROAMPHETAMINE SULFATE AND AMPHETAMINE SULFATE 7.5; 7.5; 7.5; 7.5 MG/1; MG/1; MG/1; MG/1
30 CAPSULE, EXTENDED RELEASE ORAL EVERY MORNING
Qty: 30 CAPSULE | Refills: 0 | Status: SHIPPED
Start: 2021-06-16 | End: 2021-07-14 | Stop reason: SDUPTHER

## 2021-06-16 RX ORDER — ALPRAZOLAM 0.5 MG/1
0.5 TABLET ORAL NIGHTLY PRN
Qty: 30 TABLET | Refills: 2 | Status: SHIPPED
Start: 2021-06-16 | End: 2021-07-30 | Stop reason: SDUPTHER

## 2021-07-13 DIAGNOSIS — I10 ESSENTIAL HYPERTENSION: ICD-10-CM

## 2021-07-13 RX ORDER — AMLODIPINE BESYLATE 5 MG/1
TABLET ORAL
Qty: 30 TABLET | Refills: 5 | Status: SHIPPED
Start: 2021-07-13 | End: 2022-01-26 | Stop reason: SDUPTHER

## 2021-07-13 NOTE — TELEPHONE ENCOUNTER
Patients last appointment 4/22/2021.   Patients next scheduled appointment   Future Appointments   Date Time Provider Donna Crooks   8/3/2021 11:00 AM ACE Moore CNP [facility]

## 2021-07-14 DIAGNOSIS — F98.8 ATTENTION DEFICIT DISORDER (ADD) WITHOUT HYPERACTIVITY: ICD-10-CM

## 2021-07-14 RX ORDER — GABAPENTIN 800 MG/1
800 TABLET ORAL 3 TIMES DAILY
Qty: 90 TABLET | Refills: 5 | Status: SHIPPED
Start: 2021-07-14 | End: 2021-08-12 | Stop reason: SDUPTHER

## 2021-07-14 RX ORDER — DEXTROAMPHETAMINE SACCHARATE, AMPHETAMINE ASPARTATE MONOHYDRATE, DEXTROAMPHETAMINE SULFATE AND AMPHETAMINE SULFATE 7.5; 7.5; 7.5; 7.5 MG/1; MG/1; MG/1; MG/1
30 CAPSULE, EXTENDED RELEASE ORAL EVERY MORNING
Qty: 30 CAPSULE | Refills: 0 | Status: SHIPPED
Start: 2021-07-14 | End: 2021-08-12 | Stop reason: SDUPTHER

## 2021-07-30 DIAGNOSIS — F41.9 ANXIETY: ICD-10-CM

## 2021-07-30 DIAGNOSIS — F98.8 ATTENTION DEFICIT DISORDER (ADD) WITHOUT HYPERACTIVITY: ICD-10-CM

## 2021-07-30 RX ORDER — ALPRAZOLAM 0.5 MG/1
0.5 TABLET ORAL NIGHTLY PRN
Qty: 30 TABLET | Refills: 2 | Status: SHIPPED | OUTPATIENT
Start: 2021-07-30 | End: 2021-08-29

## 2021-08-03 ENCOUNTER — TELEPHONE (OUTPATIENT)
Dept: FAMILY MEDICINE CLINIC | Age: 41
End: 2021-08-03

## 2021-08-03 ENCOUNTER — OFFICE VISIT (OUTPATIENT)
Dept: FAMILY MEDICINE CLINIC | Age: 41
End: 2021-08-03
Payer: COMMERCIAL

## 2021-08-03 VITALS
HEART RATE: 100 BPM | TEMPERATURE: 97 F | OXYGEN SATURATION: 97 % | BODY MASS INDEX: 26.88 KG/M2 | SYSTOLIC BLOOD PRESSURE: 124 MMHG | HEIGHT: 70 IN | DIASTOLIC BLOOD PRESSURE: 80 MMHG | WEIGHT: 187.8 LBS

## 2021-08-03 DIAGNOSIS — E78.01 FAMILIAL HYPERCHOLESTEROLEMIA: ICD-10-CM

## 2021-08-03 DIAGNOSIS — Z98.890 HISTORY OF LUMBAR LAMINECTOMY: ICD-10-CM

## 2021-08-03 DIAGNOSIS — F98.8 ATTENTION DEFICIT DISORDER (ADD) WITHOUT HYPERACTIVITY: Primary | ICD-10-CM

## 2021-08-03 DIAGNOSIS — Z11.59 ENCOUNTER FOR HEPATITIS C SCREENING TEST FOR LOW RISK PATIENT: ICD-10-CM

## 2021-08-03 DIAGNOSIS — Z72.0 TOBACCO USE: ICD-10-CM

## 2021-08-03 DIAGNOSIS — I10 ESSENTIAL HYPERTENSION: ICD-10-CM

## 2021-08-03 DIAGNOSIS — F31.9 BIPOLAR AFFECTIVE DISORDER, REMISSION STATUS UNSPECIFIED (HCC): ICD-10-CM

## 2021-08-03 DIAGNOSIS — F41.9 ANXIETY: ICD-10-CM

## 2021-08-03 DIAGNOSIS — G40.319 INTRACTABLE GENERALIZED IDIOPATHIC EPILEPSY WITHOUT STATUS EPILEPTICUS (HCC): ICD-10-CM

## 2021-08-03 PROCEDURE — G8427 DOCREV CUR MEDS BY ELIG CLIN: HCPCS | Performed by: NURSE PRACTITIONER

## 2021-08-03 PROCEDURE — G8419 CALC BMI OUT NRM PARAM NOF/U: HCPCS | Performed by: NURSE PRACTITIONER

## 2021-08-03 PROCEDURE — 99213 OFFICE O/P EST LOW 20 MIN: CPT | Performed by: NURSE PRACTITIONER

## 2021-08-03 PROCEDURE — 81002 URINALYSIS NONAUTO W/O SCOPE: CPT | Performed by: NURSE PRACTITIONER

## 2021-08-03 PROCEDURE — 4004F PT TOBACCO SCREEN RCVD TLK: CPT | Performed by: NURSE PRACTITIONER

## 2021-08-03 ASSESSMENT — ENCOUNTER SYMPTOMS
WHEEZING: 0
NAUSEA: 0
DIARRHEA: 0
VOICE CHANGE: 0
VOMITING: 0
ABDOMINAL PAIN: 0
TROUBLE SWALLOWING: 0
BACK PAIN: 0
CHEST TIGHTNESS: 0
COUGH: 0
BLOOD IN STOOL: 0
SHORTNESS OF BREATH: 0
CONSTIPATION: 0

## 2021-08-03 NOTE — PATIENT INSTRUCTIONS
Patient Education        Epilepsy: Care Instructions  Your Care Instructions     Epilepsy is a common condition that causes repeated seizures. The seizures are caused by bursts of electrical activity in the brain that aren't normal. Seizures may cause problems with muscle control, movement, speech, vision, or awareness. They can be scary. Epilepsy affects each person differently. Some people have only a few seizures. Others get them more often. If you know what triggers a seizure, you may be able to avoid having one. You can take medicines to control and reduce seizures. You and your doctor will need to find the right combination, schedule, and dose of medicine. This may take time and careful changes. Seizures may get worse and happen more often over time. Follow-up care is a key part of your treatment and safety. Be sure to make and go to all appointments, and call your doctor if you are having problems. It's also a good idea to know your test results and keep a list of the medicines you take. How can you care for yourself at home? To control your seizures, you need to follow your treatment plan. If you take medicine to control seizures, you must take it exactly as prescribed. The medicine works only if you take the right amount on the schedule your doctor sets up. Following this schedule keeps the right level of medicine in your body. Even missing just a few doses can allow seizures to happen. You might be on a special ketogenic diet. If so, you'll need to follow the diet exactly for it to help prevent seizures. As you follow your treatment plan, also try to figure out and avoid things that may make you more likely to have a seizure. These may include:  · Not getting enough sleep. · Using drugs or alcohol. · Being stressed. · Skipping meals. If you keep having seizures despite treatment, keep a record of them. Note the date, time of day, and any details about the seizure that you can remember.  Your doctor can use this information to plan or adjust your medicine or other treatment. The record can also help your doctor find out what kinds of seizures you are having. If you have epilepsy:  · Be sure that any doctor who treats you knows that you have epilepsy. And let the doctor know what medicines you take, if any. · Wear a medical ID bracelet. If you have a seizure or accident that leaves you unconscious or unable to speak for yourself, the bracelet will let those who are treating you know that you have epilepsy. It will also list any medicines you take to control your seizures. That way, you won't be given any medicines that will react badly with those already in your body. · Ask your doctor if it's safe for you to do things like drive or swim. · Create a seizure first-aid plan with your friends and family. The plan will help them know how to help you. The kind of plan you need can depend on the kind of seizures you have. Your doctor can tell you more about this. When should you call for help? Call 911 anytime you think you may need emergency care. For example, call if:    · A seizure does not stop as it normally does.     · You have new symptoms such as:  ? Numbness, tingling, or weakness on one side of your body or face. ? Vision changes. ? Trouble speaking or thinking clearly. Call your doctor now or seek immediate medical care if:    · You have a fever.     · You have a severe headache. Watch closely for changes in your health, and be sure to contact your doctor if:    · The normal pattern or features of your seizures change. Where can you learn more? Go to https://MoveThatBlock.comalbaReqlut.Emotient. org and sign in to your Giftbar account. Enter L313 in the [employer] box to learn more about \"Epilepsy: Care Instructions. \"     If you do not have an account, please click on the \"Sign Up Now\" link.   Current as of: August 4, 2020               Content Version: 12.9  © 2672-7307 Healthwise, Incorporated. Care instructions adapted under license by [facility] ([facility]). If you have questions about a medical condition or this instruction, always ask your healthcare professional. [address] any warranty or liability for your use of this information. Patient Education        Learning About Epilepsy  What is epilepsy? Epilepsy is a common condition that causes repeated seizures. Seizures may cause problems with muscle control, movement, speech, vision, or awareness. They usually don't last very long, but they can be scary. Treatment usually works to control and reduce seizures. What causes it? Many things can cause epilepsy. It may develop as a result of a head injury or a condition that causes damage to the brain, like a tumor or stroke. Genes may also play a role. But you don't have to have a family history to develop it. Often doctors don't know what causes epilepsy. What are the symptoms? The main symptom of epilepsy is repeated seizures that happen without warning. There are different kinds of seizures. You may notice strange smells or sounds. You may lose control of your muscles. Or your body may twitch or jerk. Your symptoms will depend on the type of seizure you have. How is it diagnosed? Diagnosing epilepsy can be hard. Your doctor will ask questions to find out what happened just before, during, and right after a seizure. Your doctor will examine you. You'll have some tests, such as an electroencephalogram. This information can help your doctor decide what kind of seizures you have and if you have epilepsy. How is it treated? You can take medicines to control and reduce seizures. Which type you use depends on the type of seizure. You and your doctor will need to find the right combination, schedule, and dose of medicine. If medicine alone doesn't help, your doctor may suggest a special diet or surgery to help reduce seizures.   How can you care for yourself at home? To control your seizures, you need to follow your treatment plan. If you take medicine to control seizures, you must take it exactly as prescribed. The medicine works only if you take the right amount on the schedule your doctor sets up. Following this schedule keeps the right level of medicine in your body. Even missing just a few doses can allow seizures to happen. You might be on a special ketogenic diet. If so, you'll need to follow the diet exactly for it to help prevent seizures. As you follow your treatment plan, also try to figure out and avoid things that may make you more likely to have a seizure. These may include:  · Not getting enough sleep. · Using drugs or alcohol. · Being stressed. · Skipping meals. If you keep having seizures despite treatment, keep a record of them. Note the date, time of day, and any details about the seizure that you can remember. Your doctor can use this information to plan or adjust your medicine or other treatment. The record can also help your doctor find out what kinds of seizures you are having. If you have epilepsy:  · Be sure that any doctor who treats you knows that you have epilepsy. And let the doctor know what medicines you take, if any. · Wear a medical ID bracelet. If you have a seizure or accident that leaves you unconscious or unable to speak for yourself, the bracelet will let those who are treating you know that you have epilepsy. It will also list any medicines you take to control your seizures. That way, you won't be given any medicines that will react badly with those already in your body. · Ask your doctor if it's safe for you to do things like drive or swim. · Create a seizure first-aid plan with your friends and family. The plan will help them know how to help you. The kind of plan you need can depend on the kind of seizures you have. Your doctor can tell you more about this.   Follow-up care is a key part of your treatment and safety. Be sure to make and go to all appointments, and call your doctor if you are having problems. It's also a good idea to know your test results and keep a list of the medicines you take. Where can you learn more? Go to https://chpedevika.Aviga Systems. org and sign in to your "[employer] account. Enter 0688 [id number] in the [facility] box to learn more about \"Learning About Epilepsy. \"     If you do not have an account, please click on the \"Sign Up Now\" link. Current as of: August 4, 2020               Content Version: 12.9  © 2006-2021 [employer]. Care instructions adapted under license by [facility] ([facility]). If you have questions about a medical condition or this instruction, always ask your healthcare professional. [address] any warranty or liability for your use of this information. Patient Education        High Cholesterol: Care Instructions  Overview     Cholesterol is a type of fat in your blood. It is needed for many body functions, such as making new cells. Cholesterol is made by your body. It also comes from food you eat. High cholesterol means that you have too much of the fat in your blood. This raises your risk of a heart attack and stroke. LDL and HDL are part of your total cholesterol. LDL is the \"bad\" cholesterol. High LDL can raise your risk for coronary artery disease, heart attack, and stroke. HDL is the \"good\" cholesterol. It helps clear bad cholesterol from the body. High HDL is linked with a lower risk of coronary artery disease, heart attack, and stroke. Your cholesterol levels help your doctor find out your risk for having a heart attack or stroke. You and your doctor can talk about whether you need to lower your risk and what treatment is best for you. Treatment options include a heart-healthy lifestyle and medicine. Both options can help lower your cholesterol and your risk.  The way you choose to lower your risk will depend on how high your risk is for heart attack and stroke. It will also depend on how you feel about taking medicines. Follow-up care is a key part of your treatment and safety. Be sure to make and go to all appointments, and call your doctor if you are having problems. It's also a good idea to know your test results and keep a list of the medicines you take. How can you care for yourself at home? · Eat heart-healthy foods. ? Eat fruits, vegetables, whole grains, beans, and other high-fiber foods. ? Eat lean proteins, such as seafood, lean meats, beans, nuts, and soy products. ? Eat healthy fats, such as canola and olive oil. ? Choose foods that are low in saturated fat. ? Limit sodium and alcohol. ? Limit drinks and foods with added sugar. · Be physically active. Try to do moderate activity at least 2½ hours a week. Or try to do vigorous activity at least 1¼ hours a week. You may want to walk or try other activities, such as running, swimming, cycling, or playing tennis or team sports. · Stay at a healthy weight or lose weight by making the changes in eating and physical activity listed above. Losing just a small amount of weight, even 5 to 10 pounds, can help reduce your risk for having a heart attack or stroke. · Do not smoke. · Manage other health problems. These include diabetes and high blood pressure. If you think you may have a problem with alcohol or drug use, talk to your doctor. · If you take medicine, take it exactly as prescribed. Call your doctor if you think you are having a problem with your medicine. · Check with your doctor or pharmacist before you use any other medicines, including over-the-counter medicines. Make sure your doctor knows all of the medicines, vitamins, herbal products, and supplements you take. Taking some medicines together can cause problems. When should you call for help?   Watch closely for changes in your health, and be sure to contact your doctor if:    · You need help making lifestyle changes.     · You have questions about your medicine. Where can you learn more? Go to https://chpepiceweb.healthNetlift. org and sign in to your Glamit account. Enter Z164 in the [facility] box to learn more about \"High Cholesterol: Care Instructions. \"     If you do not have an account, please click on the \"Sign Up Now\" link. Current as of: August 31, 2020               Content Version: 12.9  © 2006-2021 [employer]. Care instructions adapted under license by [facility] ([facility]). If you have questions about a medical condition or this instruction, always ask your healthcare professional. [place] [address] any warranty or liability for your use of this information. Patient Education        Learning About High Cholesterol  What is high cholesterol? High cholesterol means that you have too much cholesterol in your blood. Cholesterol is a type of fat. It's needed for many body functions, such as making new cells. Cholesterol is made by your body. It also comes from food you eat. Having high cholesterol can lead to the buildup of plaque in artery walls. This can increase your risk of heart attack and stroke. When your doctor talks about high cholesterol levels, your doctor is talking about your total cholesterol and LDL cholesterol (the \"bad\" cholesterol) levels. Your doctor may also speak about HDL (the \"good\" cholesterol) levels. High HDL is linked with a lower risk for coronary artery disease, heart attack, and stroke. Your cholesterol levels help your doctor find out your risk for having a heart attack or stroke. How can you prevent high cholesterol? A heart-healthy lifestyle can help you prevent high cholesterol. This lifestyle helps lower your risk for a heart attack and stroke. · Eat heart-healthy foods. ? Eat fruits, vegetables, whole grains, beans, and other high-fiber foods.   ? Eat lean proteins, such as seafood, lean meats, beans, nuts, and soy products. ? Eat healthy fats, such as canola and olive oil. ? Choose foods that are low in saturated fat. ? Limit sodium and alcohol. ? Limit drinks and foods with added sugar. · Be active. Try to do moderate activity at least 2½ hours a week. Or try to do vigorous activity at least 1¼ hours a week. You may want to walk or try other activities, such as running, swimming, cycling, or playing tennis or team sports. · Stay at a healthy weight. Lose weight if you need to. · Don't smoke. If you need help quitting, talk to your doctor about stop-smoking programs and medicines. These can increase your chances of quitting for good. How is high cholesterol treated? The goal of treatment is to reduce your chances of having a heart attack or stroke. The goal is not to lower your cholesterol numbers only. · Have a heart-healthy lifestyle. This includes eating healthy foods, not smoking, losing weight, and being more active. · You may choose to take medicine. Follow-up care is a key part of your treatment and safety. Be sure to make and go to all appointments, and call your doctor if you are having problems. It's also a good idea to know your test results and keep a list of the medicines you take. Where can you learn more? Go to https://Relevvant.Appy Hotel. org and sign in to your Dajiabao account. Enter K723 in the [facility] box to learn more about \"Learning About High Cholesterol. \"     If you do not have an account, please click on the \"Sign Up Now\" link. Current as of: August 31, 2020               Content Version: 12.9  © 5664-3769 Healthwise, Incorporated. Care instructions adapted under license by [facility] ([facility]). If you have questions about a medical condition or this instruction, always ask your healthcare professional. [place] [address] any warranty or liability for your use of this information.

## 2021-08-03 NOTE — PROGRESS NOTES
Saman Settler : 1980 Sex: male  Age: 42 y.o. Chief Complaint   Patient presents with    Medication Check     3 month        Assessment and Plan:    Jyoti Chao was seen today for medication check. Diagnoses and all orders for this visit:    Attention deficit disorder (ADD) without hyperactivity    Anxiety    Essential hypertension  -     CBC Auto Differential; Future  -     Comprehensive Metabolic Panel, Fasting; Future  -     POCT Urinalysis no Micro    History of lumbar laminectomy/discectomy L4-5, left    Tobacco use  -     POCT Urinalysis no Micro    Bipolar affective disorder, remission status unspecified ([facility] [place] [address]    Intractable generalized idiopathic epilepsy without status epilepticus ([facility] [place] [address]    Familial hypercholesterolemia  -     Lipid Panel; Future    Encounter for hepatitis C screening test for low risk patient        Recommend he see neurologist for possible eval and treatment  LAST MRI of the brain in 2017 was negative for any ominous findings. The increase ins dose improved versus resolved his symptoms so will wait due to the Transportation issue on a neurologist for now. Is see a therapist who is doing good work with him    He asked me if he should be taking Xanax twice a day as opposed to once a day I was very quick to deny the request but will be open to it if a specialist recommends it. Still no reports of change needed from the specialist      USPTF:    (BGL 97) Abnormal Blood Glucose and Type 2 Diabetes Mellitus: Screening -- Adults aged 36 to 79 years who are overweight or obese Grade: B (Recommended)    (  ) High Blood Pressure: Screening and Home Monitoring -- Adults  Grade: A (Recommended) recommends screening for high blood pressure in ages 25 years or older. obtain measurements outside of the clinical setting for diagnostic confirmation before starting treatment.  Annual screening for adults aged 36 years or older or those who are at increased risk for blood pressure    (Will discuss at 39) Colorectal Cancer: Screening --Adults aged 48 to 76 years  Grade: A (Recommended) recommends screening for colorectal cancer starting at age 48 years and continuing until age 76 years. (  ) HIV: Screening - Adolescents and Adults  Grade: A (Recommended) recommends that clinicians screen for HIV infection in ages 13 to 72 years. (  ) Hepatitis C Virus Infection: Screening--Adults at High Risk and Adults born between 1945 and 1965  Grade: B (Recommended) recommends screening for hepatitis C virus (HCV) infection in persons at high risk for infection. The USPSTF also recommends offering 1-time screening for HCV infection to adults born between 80 and 1965. (, TRI G1 35, )  Lipid Disorders in Adults: Screening -- Men 28 and Older Grade: A (Recommended) recommends screening men aged 28 and older for lipid disorders. (Rare if any) Alcohol Misuse: Screening and Behavioral Counseling Interventions in Primary Care -- Adults  Grade: B (Recommended) recommends that clinicians screen adults aged 25 years or older for alcohol misuse and provide persons engaged in risky or hazardous drinking with brief behavioral counseling interventions to reduce alcohol misuse. (BMI 26.95)  Obesity: Screening for and Management of-- All Adults  Grade: B(Recommended) recommends screening all adults for obesity. Clinicians should offer or refer patients with a body mass index (BMI) of 30 kg/m2 or higher to intensive, multicomponent behavioral interventions. (Not a fall risk)  Fall Prevention -- Exercise/Physical Therapy: Community-dwelling Adults 72 Years or Older, Increased Risk for Falls   Grade: B (Recommended) recommends exercise or physical therapy to prevent falls in community-dwelling adults aged 72 years or older who are at increased risk for falls.     (No new symptoms noted or reported today)  Depression: Screening -- General adult population, including pregnant and postpartum women  Grade: B(Recommended) recommends screening for depression in the general adult population,  Screening should be implemented with adequate systems in place to ensure accurate diagnosis, effective treatment, and appropriate follow-up. (  ) Glaucoma: Screening - Adults and Diabetic Eye Exam     (  ) Thyroid Dysfunction: Screening --      (Will consider at age 39) Prostate Cancer: Prostate-Specific Antigen (PSA)-Based Screening -- All Men  PSA     Educational materials printed for patient's review and were included in patient instructions on his After Visit Summary and given to patient at the end of visit. Counseled regarding above diagnosis, including possible risks and complications,  especially if left uncontrolled. Counseled regarding the possible side effects, risks, benefits and alternatives to treatment; patient and/or guardian verbalizes understanding, agrees, feels comfortable with and wishes to proceed with above treatment plan. Advised patient to call with any new medication issues, and read all Rx info from pharmacy to assure aware of all possible risks and side effects of medication before taking. Reviewed age and gender appropriate health screening exams and vaccinations. Advised patient regarding importance of keeping up with recommended health maintenance and to schedule as soon as possible if overdue, as this is important in assessing for undiagnosed pathology, especially cancer, as well as protecting against potentially harmful/life threatening disease. Patient verbalizes understanding and agrees with above counseling, assessment and plan. All questions answered. On 08/03/21 I have spent 30 reviewing previous notes, test results and face to face with the patient discussing the diagnosis and importance of compliance with the treatment plan as well as documenting on the day of the visit.   Educational materials exercises printed for patient's review and were included in patient instructions on their After Visit Summary and given to patient at the end of visit.      Return in about 3 months (around 11/3/2021) for Routine Visit with Labs.  Anxiety    check up    Hypertension    check up       After increasing the dose he is doing much better  No more electrical feelings reported since dose was increased  LAST VISIT  Feels like he is having static in his head feeling a SZ like presentation he has had in the past and was placed on Lamictal for it. After they put him on the Lamictal for it three years ago it got better but he was under the care of a neurologist.  Wants to go up on the medications prior to tyring to get in with a specialist.      Women's describing a static almost electrical feeling throughout his whole head episodic feels like when he was first diagnosed with epilepsy and placed on Lamictal which made his symptoms go away. He thinks it may be his Lamictal dose is too low and needs to get increased. He has not seen a neurologist for some time but his last MRI of the brain was in 2017 and was unremarkable. We do not have any of the neurology notes. Patient is unemployed and does not have any form of transportation so I have been trying to help out with keeping him on his medications until we can get him in with a specialist.    Amna Hodgse is a 62-year-old white male who has followed up on some tough times. He was once under the care of a psychotherapist but that person lost their license and since then I have been trying to manage Soheila Ports medications but based on his current living conditions and run in with the authorities it is quite evident that I need a specialist involved in medication management.     We went through his medications and there were a few medicines that he was on that were on my list that he says he is not taking I am concerned about this in the face that I feel he was maintained on an even keel when he was taking all of his medications but now that he is ran out or stopped or come off of on his own he seems to be on a slight decline. He answered some depression questions but states that he has no real plan. He denies any homicidal ideations. He stated he may have not understood the question appropriately. CHRONIC CONDITION:      HTN: Stable hypertension, controlled on  amLODIPine (NORVASC) 5 MG tablet, take 1 tablet by mouth once daily, Disp: 30 tablet, Rfl: 5 remains at a mild intensity but overall good control, without symptoms, no ringing in the ears, no headaches and no nose bleeds. Better on medications. Hyperlipidemia: Mild in intensity but controlled on diet controlled, without symptoms, no complications with dietary treatment regimen reporting no side effects or intolereances. Compliant with treatment and diet. No muscle aches, new joint pains or abd pain. Depression/Anxiety/History of SZ/ADD: Stable depression with generalized anxiety. Mild in intensity but well controlled on    ALPRAZolam (XANAX) 0.5 MG tablet, Take 1 tablet by mouth nightly as needed for Sleep for up to 30 days. , Disp: 30 tablet, Rfl: 2   amphetamine-dextroamphetamine (ADDERALL XR) 30 MG extended release capsule, Take 1 capsule by mouth every morning for 30 days. , Disp: 30 capsule, Rfl: 0  gabapentin (NEURONTIN) 800 MG tablet, Take 1 tablet by mouth 3 times daily for 30 days. , Disp: 90 tablet, Rfl: 5  DULoxetine (CYMBALTA) 60 MG extended release capsule, Take 1 capsule by mouth 2 times daily, Disp: 60 capsule, Rfl: 5  ibuprofen (ADVIL;MOTRIN) 800 MG tablet, Take 1 tablet by mouth every 6 hours as needed for Pain, Disp: 120 tablet, Rfl: 1  lamoTRIgine (LAMICTAL) 200 MG tablet, Take 1 tablet by mouth daily, Disp: 30 tablet, Rfl: 5 without symptoms, no weight gain, no increase in anxiety, no suicidal or homicidal ideation's no unexplained fatigue, or relationship difficulties relayed this visit.      Review of Systems Constitutional: Negative for activity change, chills, diaphoresis, fatigue, fever and unexpected weight change. HENT: Negative for trouble swallowing and voice change. Eyes: Negative for visual disturbance. Respiratory: Negative for cough, chest tightness, shortness of breath and wheezing. Cardiovascular: Negative for chest pain, palpitations and leg swelling. Gastrointestinal: Negative for abdominal pain, blood in stool, constipation, diarrhea, nausea and vomiting. Endocrine: Negative for polydipsia, polyphagia and polyuria. Genitourinary: Negative for dysuria, enuresis, frequency and hematuria. Musculoskeletal: Negative for arthralgias, back pain, gait problem, joint swelling, myalgias and neck stiffness. Skin: Negative for rash. Neurological: Negative for dizziness, seizures, syncope, facial asymmetry, weakness, light-headedness, numbness and headaches. Hematological: Does not bruise/bleed easily. Psychiatric/Behavioral: Negative for behavioral problems, confusion, hallucinations and suicidal ideas. The patient is not nervous/anxious. Current Outpatient Medications:     ALPRAZolam (XANAX) 0.5 MG tablet, Take 1 tablet by mouth nightly as needed for Sleep for up to 30 days. , Disp: 30 tablet, Rfl: 2    amphetamine-dextroamphetamine (ADDERALL XR) 30 MG extended release capsule, Take 1 capsule by mouth every morning for 30 days. , Disp: 30 capsule, Rfl: 0    gabapentin (NEURONTIN) 800 MG tablet, Take 1 tablet by mouth 3 times daily for 30 days. , Disp: 90 tablet, Rfl: 5    amLODIPine (NORVASC) 5 MG tablet, take 1 tablet by mouth once daily, Disp: 30 tablet, Rfl: 5    DULoxetine (CYMBALTA) 60 MG extended release capsule, Take 1 capsule by mouth 2 times daily, Disp: 60 capsule, Rfl: 5    ibuprofen (ADVIL;MOTRIN) 800 MG tablet, Take 1 tablet by mouth every 6 hours as needed for Pain, Disp: 120 tablet, Rfl: 1    lamoTRIgine (LAMICTAL) 200 MG tablet, Take 1 tablet by mouth daily, Disp: 30 tablet, Rfl: 5  Allergies   Allergen Reactions    Latex Rash     condoms    Norco [Hydrocodone-Acetaminophen] Nausea Only    Pcn [Penicillins] Hives       Past Medical History:   Diagnosis Date    ADHD     Anxiety     Bipolar 1 disorder ([address]     Cephalgia 2/4/2019    LSBHTKGE(937.6)     History of kidney stones     HTN (hypertension) 2/19/2015    Hypertension     Pain induced    Neuropathy     right ulnar    Palpitation 02/19/2015    follows Dr Le Nieto    Right carpal tunnel syndrome      Past Surgical History:   Procedure Laterality Date    BACK SURGERY      BREAST LUMPECTOMY      CARPAL TUNNEL RELEASE Right 2/15/2019    RIGHT ENDOSCOPIC CARPAL TUNNEL RELEASE performed by Lisa Beverly MD at [address] [facility] Left     lithotripsy    LUMBAR LAMINECTOMY  4/22/2015    discectomy L5 - S1    [place] DISK SURG,ANTER,CERVICAL,SINGLE LVL N/A 7/20/2018    ANTERIOR CERVICAL DISCECTOMY AND FUSION  C5-C6  WITH PLATES, SCREWS, ONE C-ARM, REGULAR BED WITH HORSE SHOE - [facility] -- BOP performed by Delphine Berrios MD at [address] Right 2/15/2019    RIGHT ULNAR NERVE IN SITU DECOMPRESSION WITH TRANSPOSITION performed by Lisa Beverly MD at [address] [facility] History   Problem Relation Age of Onset    COPD Mother     Cancer Mother     Colon Cancer Father     Seizures Sister     Other Sister         brain tumor    Cancer Paternal Aunt     Heart Disease Paternal Uncle     Heart Disease Paternal Grandmother      Social History     Socioeconomic History    Marital status:      Spouse name: Not on file    Number of children: Not on file    Years of education: Not on file    Highest education level: Not on file   Occupational History    Not on file   Tobacco Use    Smoking status: Current Every Day Smoker     Packs/day: 0.50     Years: 15.00     Pack years: 7.50     Types: Cigarettes    Smokeless tobacco: Never Used    Tobacco comment: smoke a little less than a ppd   Vaping Use    Vaping Use: Never used   Substance and Sexual Activity    Alcohol use: No    Drug use: No     Comment: former marijuana user    Sexual activity: Not on file   Other Topics Concern    Not on file   Social History Narrative    Not on file     Social Determinants of Health     Financial Resource Strain: Medium Risk    Difficulty of Paying Living Expenses: Somewhat hard   Food Insecurity:     Worried About Running Out of Food in the Last Year:     Ran Out of Food in the Last Year:    Transportation Needs:     Lack of Transportation (Medical):  Lack of Transportation (Non-Medical):    Physical Activity:     Days of Exercise per Week:     Minutes of Exercise per Session:    Stress:     Feeling of Stress :    Social Connections:     Frequency of Communication with Friends and Family:     Frequency of Social Gatherings with Friends and Family:     Attends Denominational Services:     Active Member of Clubs or Organizations:     Attends Club or Organization Meetings:     Marital Status:    Intimate Partner Violence:     Fear of Current or Ex-Partner:     Emotionally Abused:     Physically Abused:     Sexually Abused:        Vitals:    08/03/21 0955   BP: 124/80   Site: Left Upper Arm   Position: Sitting   Cuff Size: Medium Adult   Pulse: 100   Temp: 97 °F (36.1 °C)   TempSrc: Temporal   SpO2: 97%   Weight: 187 lb 12.8 oz (85.2 kg)   Height: 5' 10\" (1.778 m)       Physical Exam  Vitals and nursing note reviewed. Constitutional:       Appearance: Normal appearance. HENT:      Head: Normocephalic. Right Ear: Tympanic membrane and ear canal normal. There is no impacted cerumen. Left Ear: Tympanic membrane and ear canal normal. There is no impacted cerumen. Nose: Nose normal.      Mouth/Throat:      Mouth: Mucous membranes are dry. Eyes:      Extraocular Movements: Extraocular movements intact.       Pupils: Pupils are equal, round, and reactive to light.   Neck:      Vascular: No carotid bruit. Cardiovascular:      Rate and Rhythm: Normal rate and regular rhythm. Pulses: Normal pulses. Heart sounds: Normal heart sounds. No murmur heard. No friction rub. No gallop. Pulmonary:      Effort: Pulmonary effort is normal. No respiratory distress. Breath sounds: Normal breath sounds. No stridor. No wheezing, rhonchi or rales. Chest:      Chest wall: No tenderness. Abdominal:      General: Bowel sounds are normal. There is no distension. Palpations: Abdomen is soft. Musculoskeletal:         General: No swelling, tenderness, deformity or signs of injury. Cervical back: No rigidity. No muscular tenderness. Right lower leg: No edema. Left lower leg: No edema. Lymphadenopathy:      Cervical: No cervical adenopathy. Skin:     General: Skin is warm and dry. Capillary Refill: Capillary refill takes 2 to 3 seconds. Findings: No bruising, lesion or rash. Neurological:      General: No focal deficit present. Mental Status: He is alert and oriented to person, place, and time. Motor: No weakness. Gait: Gait normal.   Psychiatric:         Attention and Perception: Attention normal.         Mood and Affect: Mood normal.         Behavior: Behavior normal.         Thought Content: Thought content does not include homicidal or suicidal ideation. Thought content does not include homicidal or suicidal plan.                 Seen By:  ACE Cason - CNP

## 2021-08-04 ENCOUNTER — OFFICE VISIT (OUTPATIENT)
Dept: FAMILY MEDICINE CLINIC | Age: 41
End: 2021-08-04
Payer: COMMERCIAL

## 2021-08-04 VITALS
BODY MASS INDEX: 26.63 KG/M2 | DIASTOLIC BLOOD PRESSURE: 82 MMHG | TEMPERATURE: 98.4 F | HEART RATE: 106 BPM | WEIGHT: 186 LBS | SYSTOLIC BLOOD PRESSURE: 138 MMHG | HEIGHT: 70 IN | OXYGEN SATURATION: 97 %

## 2021-08-04 DIAGNOSIS — Z72.89 OTHER PROBLEMS RELATED TO LIFESTYLE: Primary | ICD-10-CM

## 2021-08-04 DIAGNOSIS — Z11.59 ENCOUNTER FOR HEPATITIS C SCREENING TEST FOR LOW RISK PATIENT: ICD-10-CM

## 2021-08-04 DIAGNOSIS — Z72.89 OTHER PROBLEMS RELATED TO LIFESTYLE: ICD-10-CM

## 2021-08-04 PROCEDURE — G8427 DOCREV CUR MEDS BY ELIG CLIN: HCPCS | Performed by: NURSE PRACTITIONER

## 2021-08-04 PROCEDURE — G8419 CALC BMI OUT NRM PARAM NOF/U: HCPCS | Performed by: NURSE PRACTITIONER

## 2021-08-04 PROCEDURE — 4004F PT TOBACCO SCREEN RCVD TLK: CPT | Performed by: NURSE PRACTITIONER

## 2021-08-04 PROCEDURE — 99213 OFFICE O/P EST LOW 20 MIN: CPT | Performed by: NURSE PRACTITIONER

## 2021-08-04 ASSESSMENT — ENCOUNTER SYMPTOMS
BACK PAIN: 0
WHEEZING: 0
NAUSEA: 0
SHORTNESS OF BREATH: 0
CHEST TIGHTNESS: 0
VOICE CHANGE: 0
BLOOD IN STOOL: 0
CONSTIPATION: 0
VOMITING: 0
COUGH: 0
DIARRHEA: 0
ABDOMINAL PAIN: 0
TROUBLE SWALLOWING: 0

## 2021-08-04 NOTE — PROGRESS NOTES
Bi Bynum : 1980 Sex: male  Age: 42 y.o. Chief Complaint   Patient presents with    Other     Would like to be tested for STD due to his ex having Hep C. Assessment and Plan:    There are no diagnoses linked to this encounter. Recommend he see neurologist for possible eval and treatment  LAST MRI of the brain in 2017 was negative for any ominous findings. The increase ins dose improved versus resolved his symptoms so will wait due to the Transportation issue on a neurologist for now. Is see a therapist who is doing good work with him    He asked me if he should be taking Xanax twice a day as opposed to once a day I was very quick to deny the request but will be open to it if a specialist recommends it. Still no reports of change needed from the specialist      USPTF:    (BGL 97) Abnormal Blood Glucose and Type 2 Diabetes Mellitus: Screening -- Adults aged 36 to 79 years who are overweight or obese Grade: B (Recommended)    (  ) High Blood Pressure: Screening and Home Monitoring -- Adults  Grade: A (Recommended) recommends screening for high blood pressure in ages 25 years or older. obtain measurements outside of the clinical setting for diagnostic confirmation before starting treatment. Annual screening for adults aged 36 years or older or those who are at increased risk for blood pressure    (Will discuss at 39) Colorectal Cancer: Screening --Adults aged 48 to 76 years  Grade: A (Recommended) recommends screening for colorectal cancer starting at age 48 years and continuing until age 76 years. (  ) HIV: Screening - Adolescents and Adults  Grade: A (Recommended) recommends that clinicians screen for HIV infection in ages 13 to 72 years. (  ) Hepatitis C Virus Infection: Screening--Adults at High Risk and Adults born between 1945 and 1965  Grade: B (Recommended) recommends screening for hepatitis C virus (HCV) infection in persons at high risk for infection.  The USPSTF also recommends offering 1-time screening for HCV infection to adults born between 1945 and [facility] 350. (, TRI G1 35, )  Lipid Disorders in Adults: Screening -- Men 28 and Older Grade: A (Recommended) recommends screening men aged 28 and older for lipid disorders. (Rare if any) Alcohol Misuse: Screening and Behavioral Counseling Interventions in Primary Care -- Adults  Grade: B (Recommended) recommends that clinicians screen adults aged 25 years or older for alcohol misuse and provide persons engaged in risky or hazardous drinking with brief behavioral counseling interventions to reduce alcohol misuse. (BMI 26.95)  Obesity: Screening for and Management of-- All Adults  Grade: B(Recommended) recommends screening all adults for obesity. Clinicians should offer or refer patients with a body mass index (BMI) of 30 kg/m2 or higher to intensive, multicomponent behavioral interventions. (Not a fall risk)  Fall Prevention -- Exercise/Physical Therapy: Community-dwelling Adults 72 Years or Older, Increased Risk for Falls   Grade: B (Recommended) recommends exercise or physical therapy to prevent falls in community-dwelling adults aged 72 years or older who are at increased risk for falls. (No new symptoms noted or reported today)  Depression: Screening -- General adult population, including pregnant and postpartum women  Grade: B(Recommended) recommends screening for depression in the general adult population,  Screening should be implemented with adequate systems in place to ensure accurate diagnosis, effective treatment, and appropriate follow-up.     (  ) Glaucoma: Screening - Adults and Diabetic Eye Exam     (  ) Thyroid Dysfunction: Screening --      (Will consider at age 39) Prostate Cancer: Prostate-Specific Antigen (PSA)-Based Screening -- All Men  PSA     Educational materials printed for patient's review and were included in patient instructions on his After Visit Summary and given to patient at the end of visit. Counseled regarding above diagnosis, including possible risks and complications,  especially if left uncontrolled. Counseled regarding the possible side effects, risks, benefits and alternatives to treatment; patient and/or guardian verbalizes understanding, agrees, feels comfortable with and wishes to proceed with above treatment plan. Advised patient to call with any new medication issues, and read all Rx info from pharmacy to assure aware of all possible risks and side effects of medication before taking. Reviewed age and gender appropriate health screening exams and vaccinations. Advised patient regarding importance of keeping up with recommended health maintenance and to schedule as soon as possible if overdue, as this is important in assessing for undiagnosed pathology, especially cancer, as well as protecting against potentially harmful/life threatening disease. Patient verbalizes understanding and agrees with above counseling, assessment and plan. All questions answered. On 08/04/21 I have spent 30 reviewing previous notes, test results and face to face with the patient discussing the diagnosis and importance of compliance with the treatment plan as well as documenting on the day of the visit. Educational materials exercises printed for patient's review and were included in patient instructions on their After Visit Summary and given to patient at the end of visit.      No follow-ups on file.  Anxiety    check up    Hypertension    check up       After increasing the dose he is doing much better  No more electrical feelings reported since dose was increased  LAST VISIT  Feels like he is having static in his head feeling a SZ like presentation he has had in the past and was placed on Lamictal for it.   After they put him on the Lamictal for it three years ago it got better but he was under the care of a neurologist.  Wants to go up on the medications prior to tyring to get in with a specialist.      Women's describing a static almost electrical feeling throughout his whole head episodic feels like when he was first diagnosed with epilepsy and placed on Lamictal which made his symptoms go away. He thinks it may be his Lamictal dose is too low and needs to get increased. He has not seen a neurologist for some time but his last MRI of the brain was in 2017 and was unremarkable. We do not have any of the neurology notes. Patient is unemployed and does not have any form of transportation so I have been trying to help out with keeping him on his medications until we can get him in with a specialist.    Yazmin Ibrahim is a 44-year-old white male who has followed up on some tough times. He was once under the care of a psychotherapist but that person lost their license and since then I have been trying to manage [place] medications but based on his current living conditions and run in with the authorities it is quite evident that I need a specialist involved in medication management. We went through his medications and there were a few medicines that he was on that were on my list that he says he is not taking I am concerned about this in the face that I feel he was maintained on an even keel when he was taking all of his medications but now that he is ran out or stopped or come off of on his own he seems to be on a slight decline. He answered some depression questions but states that he has no real plan. He denies any homicidal ideations. He stated he may have not understood the question appropriately. Other  Pertinent negatives include no abdominal pain, arthralgias, chest pain, chills, coughing, diaphoresis, fatigue, fever, headaches, joint swelling, myalgias, nausea, numbness, rash, vomiting or weakness.        CHRONIC CONDITION:      HTN: Stable hypertension, controlled on  amLODIPine (NORVASC) 5 MG tablet, take 1 tablet by mouth once daily, Disp: 30 tablet, Rfl: 5 remains at a mild intensity but overall good control, without symptoms, no ringing in the ears, no headaches and no nose bleeds. Better on medications. Hyperlipidemia: Mild in intensity but controlled on diet controlled, without symptoms, no complications with dietary treatment regimen reporting no side effects or intolereances. Compliant with treatment and diet. No muscle aches, new joint pains or abd pain. Depression/Anxiety/History of SZ/ADD: Stable depression with generalized anxiety. Mild in intensity but well controlled on    ALPRAZolam (XANAX) 0.5 MG tablet, Take 1 tablet by mouth nightly as needed for Sleep for up to 30 days. , Disp: 30 tablet, Rfl: 2   amphetamine-dextroamphetamine (ADDERALL XR) 30 MG extended release capsule, Take 1 capsule by mouth every morning for 30 days. , Disp: 30 capsule, Rfl: 0  gabapentin (NEURONTIN) 800 MG tablet, Take 1 tablet by mouth 3 times daily for 30 days. , Disp: 90 tablet, Rfl: 5  DULoxetine (CYMBALTA) 60 MG extended release capsule, Take 1 capsule by mouth 2 times daily, Disp: 60 capsule, Rfl: 5  ibuprofen (ADVIL;MOTRIN) 800 MG tablet, Take 1 tablet by mouth every 6 hours as needed for Pain, Disp: 120 tablet, Rfl: 1  lamoTRIgine (LAMICTAL) 200 MG tablet, Take 1 tablet by mouth daily, Disp: 30 tablet, Rfl: 5 without symptoms, no weight gain, no increase in anxiety, no suicidal or homicidal ideation's no unexplained fatigue, or relationship difficulties relayed this visit. Review of Systems   Constitutional: Negative for activity change, chills, diaphoresis, fatigue, fever and unexpected weight change. HENT: Negative for trouble swallowing and voice change. Eyes: Negative for visual disturbance. Respiratory: Negative for cough, chest tightness, shortness of breath and wheezing. Cardiovascular: Negative for chest pain, palpitations and leg swelling.    Gastrointestinal: Negative for abdominal pain, blood in stool, constipation, diarrhea, nausea and vomiting. Endocrine: Negative for polydipsia, polyphagia and polyuria. Genitourinary: Negative for dysuria, enuresis, frequency and hematuria. Musculoskeletal: Negative for arthralgias, back pain, gait problem, joint swelling, myalgias and neck stiffness. Skin: Negative for rash. Neurological: Negative for dizziness, seizures, syncope, facial asymmetry, weakness, light-headedness, numbness and headaches. Hematological: Does not bruise/bleed easily. Psychiatric/Behavioral: Negative for behavioral problems, confusion, hallucinations and suicidal ideas. The patient is not nervous/anxious. Current Outpatient Medications:     ALPRAZolam (XANAX) 0.5 MG tablet, Take 1 tablet by mouth nightly as needed for Sleep for up to 30 days. , Disp: 30 tablet, Rfl: 2    amphetamine-dextroamphetamine (ADDERALL XR) 30 MG extended release capsule, Take 1 capsule by mouth every morning for 30 days. , Disp: 30 capsule, Rfl: 0    gabapentin (NEURONTIN) 800 MG tablet, Take 1 tablet by mouth 3 times daily for 30 days. , Disp: 90 tablet, Rfl: 5    amLODIPine (NORVASC) 5 MG tablet, take 1 tablet by mouth once daily, Disp: 30 tablet, Rfl: 5    DULoxetine (CYMBALTA) 60 MG extended release capsule, Take 1 capsule by mouth 2 times daily, Disp: 60 capsule, Rfl: 5    ibuprofen (ADVIL;MOTRIN) 800 MG tablet, Take 1 tablet by mouth every 6 hours as needed for Pain, Disp: 120 tablet, Rfl: 1    lamoTRIgine (LAMICTAL) 200 MG tablet, Take 1 tablet by mouth daily, Disp: 30 tablet, Rfl: 5  Allergies   Allergen Reactions    Latex Rash     condoms    Norco [Hydrocodone-Acetaminophen] Nausea Only    Pcn [Penicillins] Hives       Past Medical History:   Diagnosis Date    ADHD     Anxiety     Bipolar 1 disorder ([facility] [address]     Cephalgia 2/4/2019    EOPKVEDL(453.6)     History of kidney stones     HTN (hypertension) 2/19/2015    Hypertension     Pain induced    Neuropathy     right ulnar    Palpitation 02/19/2015    follows Dr Kin Crowe    Right carpal tunnel syndrome      Past Surgical History:   Procedure Laterality Date    BACK SURGERY      BREAST LUMPECTOMY      CARPAL TUNNEL RELEASE Right 2/15/2019    RIGHT ENDOSCOPIC CARPAL TUNNEL RELEASE performed by Sadia Montilla MD at [facility] Left     lithotripsy    LUMBAR LAMINECTOMY  4/22/2015    discectomy L5 - S1    NC DISK SURG,ANTER,CERVICAL,SINGLE LVL N/A 7/20/2018    ANTERIOR CERVICAL DISCECTOMY AND FUSION  C5-C6  WITH PLATES, SCREWS, ONE C-ARM, REGULAR BED WITH HORSE SHOE - [employer] -- BOP performed by Jeane Rodriguez MD at [address] Right 2/15/2019    RIGHT ULNAR NERVE IN SITU DECOMPRESSION WITH TRANSPOSITION performed by Sadia Montilla MD at [facility] History   Problem Relation Age of Onset    COPD Mother     Cancer Mother     Colon Cancer Father     Seizures Sister     Other Sister         brain tumor    Cancer Paternal Aunt     Heart Disease Paternal Uncle     Heart Disease Paternal Grandmother      Social History     Socioeconomic History    Marital status:      Spouse name: Not on file    Number of children: Not on file    Years of education: Not on file    Highest education level: Not on file   Occupational History    Not on file   Tobacco Use    Smoking status: Current Every Day Smoker     Packs/day: 0.50     Years: 15.00     Pack years: 7.50     Types: Cigarettes    Smokeless tobacco: Never Used    Tobacco comment: smoke a little less than a ppd   Vaping Use    Vaping Use: Never used   Substance and Sexual Activity    Alcohol use: No    Drug use: No     Comment: former marijuana user    Sexual activity: Not on file   Other Topics Concern    Not on file   Social History Narrative    Not on file     Social Determinants of Health     Financial Resource Strain: Medium Risk    Difficulty of Paying Living Expenses: Somewhat hard   Food Insecurity:  Worried About [facility] in the Last Year:    [address] in the Last Year:    Transportation Needs:     Lack of Transportation (Medical):  Lack of Transportation (Non-Medical):    Physical Activity:     Days of Exercise per Week:     Minutes of Exercise per Session:    Stress:     Feeling of Stress :    Social Connections:     Frequency of Communication with Friends and Family:     Frequency of Social Gatherings with Friends and Family:     Attends [employer] Services:     Active Member of Clubs or Organizations:     Attends Club or Organization Meetings:     Marital Status:    Intimate Partner Violence:     Fear of Current or Ex-Partner:     Emotionally Abused:     Physically Abused:     Sexually Abused:        Vitals:    08/04/21 1523   BP: 138/82   Site: Left Upper Arm   Position: Sitting   Cuff Size: Medium Adult   Pulse: 106   Temp: 98.4 °F (36.9 °C)   TempSrc: Temporal   SpO2: 97%   Weight: 186 lb (84.4 kg)   Height: 5' 10\" (1.778 m)       Physical Exam  Vitals and nursing note reviewed. Constitutional:       Appearance: Normal appearance. HENT:      Head: Normocephalic. Right Ear: Tympanic membrane and ear canal normal. There is no impacted cerumen. Left Ear: Tympanic membrane and ear canal normal. There is no impacted cerumen. Nose: Nose normal.      Mouth/Throat:      Mouth: Mucous membranes are dry. Eyes:      Extraocular Movements: Extraocular movements intact. Pupils: Pupils are equal, round, and reactive to light. Neck:      Vascular: No carotid bruit. Cardiovascular:      Rate and Rhythm: Normal rate and regular rhythm. Pulses: Normal pulses. Heart sounds: Normal heart sounds. No murmur heard. No friction rub. No gallop. Pulmonary:      Effort: Pulmonary effort is normal. No respiratory distress. Breath sounds: Normal breath sounds. No stridor. No wheezing, rhonchi or rales. Chest:      Chest wall: No tenderness. Abdominal:      General: Bowel sounds are normal. There is no distension. Palpations: Abdomen is soft. Musculoskeletal:         General: No swelling, tenderness, deformity or signs of injury. Cervical back: No rigidity. No muscular tenderness. Right lower leg: No edema. Left lower leg: No edema. Lymphadenopathy:      Cervical: No cervical adenopathy. Skin:     General: Skin is warm and dry. Capillary Refill: Capillary refill takes 2 to 3 seconds. Findings: No bruising, lesion or rash. Neurological:      General: No focal deficit present. Mental Status: He is alert and oriented to person, place, and time. Motor: No weakness. Gait: Gait normal.   Psychiatric:         Attention and Perception: Attention normal.         Mood and Affect: Mood normal.         Behavior: Behavior normal.         Thought Content: Thought content does not include homicidal or suicidal ideation. Thought content does not include homicidal or suicidal plan.                 Seen By:  ACE Faustin - CNP

## 2021-08-04 NOTE — PATIENT INSTRUCTIONS
Patient Education        Hepatitis C Viral Load: About This Test  What is a hepatitis C viral load test?     A viral load test measures how much hepatitis C virus is in the blood. Viral load may be measured before, during, and after treatment. Why is the test done? A hepatitis C viral load test is done to see if the treatment is working. It's also used after treatment to see if it was effective. How is the test done? A health professional uses a needle to take a blood sample, usually from the arm. What happens after the test?  · You will probably be able to go home right away. · You can go back to your usual activities right away. · It may take up to 2 weeks to get the results of your test. Your doctor will compare your current test result with the results of tests you had before. He or she will talk with you about your results and explain what they mean. Follow-up care is a key part of your treatment and safety. Be sure to make and go to all appointments, and call your doctor if you are having problems. Ask your doctor when you can expect to have your test results. Where can you learn more? Go to https://Socialbakers.IntroFly. org and sign in to your Outerstuff account. Enter V145 in the RentersQ box to learn more about \"Hepatitis C Viral Load: About This Test.\"     If you do not have an account, please click on the \"Sign Up Now\" link. Current as of: September 23, 2020               Content Version: 12.9  © 2006-2021 Healthwise, [facility]. Care instructions adapted under license by [facility] ([facility]). If you have questions about a medical condition or this instruction, always ask your healthcare professional. [place] [address] any warranty or liability for your use of this information. Patient Education        Hepatitis C Virus Tests: About These Tests  What are they?      Hepatitis C virus tests are blood tests that check for substances in the blood that show [employer]orporated. Care instructions adapted under license by [facility] ([facility]). If you have questions about a medical condition or this instruction, always ask your healthcare professional. [address] any warranty or liability for your use of this information. Patient Education        Learning About Hepatitis C  What is hepatitis C? Hepatitis C is a disease caused by a virus that infects the liver. In time, it can lead to cirrhosis, liver cancer, or liver failure. Many people don't know that they have the virus until they already have some liver damage. This can take many years. Some people who get the infection have it for a short time (acute) and then get better. But most people who have it go on to develop long-term, or chronic, infection. Although hepatitis C can be very serious, most people can manage it and lead active, full lives. What happens when you have hepatitis C? Some people who get hepatitis C have it for a short time (acute infection) and then get better. But most people get long-term, or chronic, infection. This can lead to liver damage. Long-term hepatitis C often causes tiny scars in your liver. If you have a lot of scars, it becomes hard for your liver to work well. Over time, some people have more serious problems such as cirrhosis or liver cancer. What are the symptoms? Most people who have hepatitis C don't have symptoms. If there are symptoms, they may include fatigue, pain in the belly and joints, itchy skin, sore muscles, and dark urine. There may also be jaundice. This is a condition in which the skin and the whites of the eyes look yellow. How can you prevent hepatitis C? There is no vaccine to prevent the disease. Anyone who has hepatitis C can spread the virus to someone else. You can take steps to make infection less likely. · Don't share needles to inject drugs.   · Make sure all tools and supplies are sterilized if you get a tattoo or body piercing, or have acupuncture. · Don't share anything that might have infected blood on it. This may include a toothbrush, razor, or nail clippers. · Use latex condoms during sex if you have HIV. Also use latex condoms if you have multiple sex partners or a sexually transmitted infection. How is hepatitis C treated? · If you have acute hepatitis C, your doctor will probably prescribe medicine. · If you have chronic hepatitis C, your treatment depends on whether you have liver damage, other health problems you may have, and how much virus is in your body and what type it is. · You will need to see your doctor regularly to have blood tests to check your liver. Follow-up care is a key part of your treatment and safety. Be sure to make and go to all appointments, and call your doctor if you are having problems. It's also a good idea to know your test results and keep a list of the medicines you take. Where can you learn more? Go to https://Weizoom.Osmosis [email or web address] org and sign in to your Swiftcourt account. Enter C666 in the Ynvisible box to learn more about \"Learning About Hepatitis C. \"     If you do not have an account, please click on the \"Sign Up Now\" link. Current as of: September 23, 2020               Content Version: 12.9  © 2006-2021 Healthwise, [employer]. Care instructions adapted under license by [facility] ([facility]). If you have questions about a medical condition or this instruction, always ask your healthcare professional. [place] [address] any warranty or liability for your use of this information. Patient Education        Alcoholic Hepatitis: Care Instructions  Overview     Alcoholic hepatitis is damage to the liver. It is caused by long-term heavy drinking. The liver gets inflamed and swollen. Then the liver can't remove waste products from the body. This condition can lead to permanent scarring in the liver. This scarring is called cirrhosis.  If drinking is stopped before the liver is severely damaged, your liver may get better. You may have to make changes in your diet and adjust your medicines. Follow-up care is a key part of your treatment and safety. Be sure to make and go to all appointments, and call your doctor if you are having problems. It's also a good idea to know your test results and keep a list of the medicines you take. How can you care for yourself at home? · Do not drink alcohol. Alcohol can damage the liver. Tell your doctor if you need help to quit. Counseling and support groups can help you stay sober. Sometimes medicines can help too. · Be safe with medicines. Take your medicines exactly as prescribed. Call your doctor if you think you are having a problem with your medicine. You will get more details on the specific medicines your doctor prescribes. · Do not take any over-the-counter medicines, including ibuprofen (Advil, Motrin), naproxen (Aleve), and acetaminophen (Tylenol), unless your doctor tells you to. · Make sure your doctor knows all of the medicines, vitamins, supplements, and herbal remedies you take. · Follow your doctor's instructions about your diet. See a dietitian if you need help choosing the right foods. When should you call for help? Call 911 anytime you think you may need emergency care. For example, call if:    · You have trouble breathing.     · You vomit blood or what looks like coffee grounds. Call your doctor now or seek immediate medical care if:    · You feel very sleepy or confused.     · You have a fever.     · There is a new or increasing yellow tint to your skin or the whites of your eyes.     · You have new or worse belly pain.     · You have any abnormal bleeding, such as:  ? Nosebleeds. ? Vaginal bleeding that is different (heavier, more frequent, at a different time of the month) than what you are used to.  ? Bloody or black stools, or rectal bleeding. ? Bloody or pink urine.    Watch closely for changes in your health, and be sure to contact your doctor if:    · You have any problems.     · Your belly is getting bigger.     · You are gaining weight. Where can you learn more? Go to https://Investment UndergroundpeCoinPass.[email or web address] org and sign in to your [employer] account. Enter B336 in the [facility] box to learn more about \"Alcoholic Hepatitis: Care Instructions. \"     If you do not have an account, please click on the \"Sign Up Now\" link. Current as of: September 23, 2020               Content Version: 12.9  © 2006-2021 [email or web address]. Care instructions adapted under license by [facility] ([facility]). If you have questions about a medical condition or this instruction, always ask your healthcare professional. [address] any warranty or liability for your use of this information. Patient Education        Autoimmune Hepatitis: Care Instructions  Your Care Instructions     Autoimmune hepatitis is a long-term disease that makes the body's defenses (immune system) attack the liver. This causes liver inflammation and damage. Sometimes chemicals, certain medicines, or a virus can cause cells in your body to attack your liver. Some people appear to be more likely to get this disease. And women get it more often than men. It can cause tiredness, belly discomfort, and itchy skin. You may also have diarrhea and fluid buildup in your belly (ascites). Your skin and eyes may look yellow. This is called jaundice. And you may not want to eat, so you may lose weight. But there are medicines you can take to keep your liver damage from getting worse. Follow-up care is a key part of your treatment and safety. Be sure to make and go to all appointments, and call your doctor if you are having problems. It's also a good idea to know your test results and keep a list of the medicines you take. How can you care for yourself at home? · Be safe with medicines.  Take your medicines exactly as prescribed. Call your doctor if you have any problems with your medicine. You will get more details on the specific medicines your doctor prescribes. · Lower your activity to match your energy. · Avoid alcohol for as long as your doctor tells you to. Tell your doctor if you need help to quit. Counseling, support groups, and sometimes medicines can help you stay sober. · Make sure your doctor knows all the medicines you take. Some medicines, such as acetaminophen (Tylenol), can make liver problems worse. Do not take any new medicines, and do not stop taking prescribed medicines, unless your doctor says it is okay. · Follow your doctor's instructions about your diet. You may need a low-salt diet. Salt is in many prepared foods, such as conklin, canned foods, snack foods, sauces, and soups. Look for reduced-salt products. · If you have itchy skin, keep cool and stay out of the sun. It may help to wear cotton clothing. Talk to your doctor about medicines that can be used for itching. When should you call for help? Call 911 anytime you think you may need emergency care. For example, call if:    · You have trouble breathing.     · You vomit blood or what looks like coffee grounds. Call your doctor now or seek immediate medical care if:    · You feel very sleepy or confused.     · You have a fever.     · There is a new or increasing yellow tint to your skin or the whites of your eyes.     · You have new or worse belly pain.     · You have any abnormal bleeding, such as:  ? Nosebleeds. ? Vaginal bleeding that is different (heavier, more frequent, at a different time of the month) than what you are used to.  ? Bloody or black stools, or rectal bleeding. ? Bloody or pink urine. Watch closely for changes in your health, and be sure to contact your doctor if:    · You have any problems.     · Your belly is getting bigger.     · You are gaining weight. Where can you learn more?   Go to https://chpepiceweb.Auspherix. org and sign in to your [employer] Environments[employer] account. Enter L831 in the Attivio box to learn more about \"Autoimmune Hepatitis: Care Instructions. \"     If you do not have an account, please click on the \"Sign Up Now\" link. Current as of: September 23, 2020               Content Version: 12.9  © 3357-6037 [employer], [facility]. Care instructions adapted under license by [facility]. If you have questions about a medical condition or this instruction, always ask your healthcare professional. [address] any warranty or liability for your use of this information.

## 2021-08-05 LAB — HEPATITIS C ANTIBODY INTERPRETATION: NORMAL

## 2021-08-11 RX ORDER — LAMOTRIGINE 200 MG/1
TABLET ORAL
Qty: 30 TABLET | Refills: 5 | Status: SHIPPED
Start: 2021-08-11 | End: 2021-12-23 | Stop reason: SDUPTHER

## 2021-08-11 NOTE — TELEPHONE ENCOUNTER
Patients last appointment 8/4/2021. Patients next scheduled appointment   Future Appointments   Date Time Provider Donna Crooks   11/2/2021  8:15 AM SCHEDULE, KAITLYNN LUIS E. [facility]   11/9/2021 10:00 AM ACE Drake CNP EAnders [facility]

## 2021-08-12 DIAGNOSIS — F98.8 ATTENTION DEFICIT DISORDER (ADD) WITHOUT HYPERACTIVITY: ICD-10-CM

## 2021-08-12 RX ORDER — GABAPENTIN 800 MG/1
800 TABLET ORAL 3 TIMES DAILY
Qty: 90 TABLET | Refills: 5 | Status: SHIPPED
Start: 2021-08-12 | End: 2021-11-04 | Stop reason: SDUPTHER

## 2021-08-12 RX ORDER — DEXTROAMPHETAMINE SACCHARATE, AMPHETAMINE ASPARTATE MONOHYDRATE, DEXTROAMPHETAMINE SULFATE AND AMPHETAMINE SULFATE 7.5; 7.5; 7.5; 7.5 MG/1; MG/1; MG/1; MG/1
30 CAPSULE, EXTENDED RELEASE ORAL EVERY MORNING
Qty: 30 CAPSULE | Refills: 0 | Status: SHIPPED
Start: 2021-08-12 | End: 2021-09-08 | Stop reason: SDUPTHER

## 2021-09-01 ENCOUNTER — TELEPHONE (OUTPATIENT)
Dept: FAMILY MEDICINE CLINIC | Age: 41
End: 2021-09-01

## 2021-09-01 RX ORDER — ONDANSETRON 4 MG/1
4 TABLET, FILM COATED ORAL 3 TIMES DAILY PRN
Qty: 30 TABLET | Refills: 0 | Status: SHIPPED
Start: 2021-09-01 | End: 2021-11-04 | Stop reason: SDUPTHER

## 2021-09-01 NOTE — TELEPHONE ENCOUNTER
----- Message from Fortino Gowers sent at 9/1/2021 12:43 PM EDT -----  Subject: Message to Provider    QUESTIONS  Information for Provider? The patient is having some nausea and wanting to   get ondansetron (ZOFRAN) 4 MG tablet refilled. requesting call back when   called in.  ---------------------------------------------------------------------------  --------------  CALL BACK INFO  What is the best way for the office to contact you? OK to leave message on   voicemail  Preferred Call Back Phone Number? [phone]  ---------------------------------------------------------------------------  --------------  SCRIPT ANSWERS  Relationship to Patient?  Self

## 2021-09-08 DIAGNOSIS — F98.8 ATTENTION DEFICIT DISORDER (ADD) WITHOUT HYPERACTIVITY: ICD-10-CM

## 2021-09-08 RX ORDER — DEXTROAMPHETAMINE SACCHARATE, AMPHETAMINE ASPARTATE MONOHYDRATE, DEXTROAMPHETAMINE SULFATE AND AMPHETAMINE SULFATE 7.5; 7.5; 7.5; 7.5 MG/1; MG/1; MG/1; MG/1
30 CAPSULE, EXTENDED RELEASE ORAL EVERY MORNING
Qty: 30 CAPSULE | Refills: 0 | Status: SHIPPED
Start: 2021-09-08 | End: 2021-10-06 | Stop reason: SDUPTHER

## 2021-10-06 DIAGNOSIS — F98.8 ATTENTION DEFICIT DISORDER (ADD) WITHOUT HYPERACTIVITY: ICD-10-CM

## 2021-10-06 RX ORDER — DEXTROAMPHETAMINE SACCHARATE, AMPHETAMINE ASPARTATE MONOHYDRATE, DEXTROAMPHETAMINE SULFATE AND AMPHETAMINE SULFATE 7.5; 7.5; 7.5; 7.5 MG/1; MG/1; MG/1; MG/1
30 CAPSULE, EXTENDED RELEASE ORAL EVERY MORNING
Qty: 30 CAPSULE | Refills: 0 | Status: SHIPPED
Start: 2021-10-06 | End: 2021-11-04 | Stop reason: SDUPTHER

## 2021-11-02 ENCOUNTER — NURSE ONLY (OUTPATIENT)
Dept: FAMILY MEDICINE CLINIC | Age: 41
End: 2021-11-02
Payer: COMMERCIAL

## 2021-11-02 DIAGNOSIS — E78.01 FAMILIAL HYPERCHOLESTEROLEMIA: ICD-10-CM

## 2021-11-02 DIAGNOSIS — I10 ESSENTIAL HYPERTENSION: ICD-10-CM

## 2021-11-02 DIAGNOSIS — I10 ESSENTIAL HYPERTENSION: Primary | ICD-10-CM

## 2021-11-02 LAB
ALBUMIN SERPL-MCNC: 4.7 G/DL (ref 3.5–5.2)
ALP BLD-CCNC: 73 U/L (ref 40–129)
ALT SERPL-CCNC: 11 U/L (ref 0–40)
ANION GAP SERPL CALCULATED.3IONS-SCNC: 14 MMOL/L (ref 7–16)
AST SERPL-CCNC: 21 U/L (ref 0–39)
BASOPHILS ABSOLUTE: 0.07 E9/L (ref 0–0.2)
BASOPHILS RELATIVE PERCENT: 0.6 % (ref 0–2)
BILIRUB SERPL-MCNC: 0.4 MG/DL (ref 0–1.2)
BILIRUBIN, POC: NORMAL
BLOOD URINE, POC: NEGATIVE
BUN BLDV-MCNC: 12 MG/DL (ref 6–20)
CALCIUM SERPL-MCNC: 9.6 MG/DL (ref 8.6–10.2)
CHLORIDE BLD-SCNC: 100 MMOL/L (ref 98–107)
CHOLESTEROL, TOTAL: 232 MG/DL (ref 0–199)
CLARITY, POC: NORMAL
CO2: 24 MMOL/L (ref 22–29)
COLOR, POC: NORMAL
CREAT SERPL-MCNC: 0.9 MG/DL (ref 0.7–1.2)
EOSINOPHILS ABSOLUTE: 0.54 E9/L (ref 0.05–0.5)
EOSINOPHILS RELATIVE PERCENT: 4.5 % (ref 0–6)
GFR AFRICAN AMERICAN: >60
GFR NON-AFRICAN AMERICAN: >60 ML/MIN/1.73
GLUCOSE FASTING: 107 MG/DL (ref 74–99)
GLUCOSE URINE, POC: NEGATIVE
HCT VFR BLD CALC: 43.1 % (ref 37–54)
HDLC SERPL-MCNC: 40 MG/DL
HEMOGLOBIN: 14.1 G/DL (ref 12.5–16.5)
IMMATURE GRANULOCYTES #: 0.04 E9/L
IMMATURE GRANULOCYTES %: 0.3 % (ref 0–5)
KETONES, POC: NEGATIVE
LDL CHOLESTEROL CALCULATED: 159 MG/DL (ref 0–99)
LEUKOCYTE EST, POC: NEGATIVE
LYMPHOCYTES ABSOLUTE: 2.58 E9/L (ref 1.5–4)
LYMPHOCYTES RELATIVE PERCENT: 21.6 % (ref 20–42)
MCH RBC QN AUTO: 29.4 PG (ref 26–35)
MCHC RBC AUTO-ENTMCNC: 32.7 % (ref 32–34.5)
MCV RBC AUTO: 89.8 FL (ref 80–99.9)
MONOCYTES ABSOLUTE: 0.82 E9/L (ref 0.1–0.95)
MONOCYTES RELATIVE PERCENT: 6.9 % (ref 2–12)
NEUTROPHILS ABSOLUTE: 7.89 E9/L (ref 1.8–7.3)
NEUTROPHILS RELATIVE PERCENT: 66.1 % (ref 43–80)
NITRITE, POC: NEGATIVE
PDW BLD-RTO: 13.2 FL (ref 11.5–15)
PH, POC: 6
PLATELET # BLD: 392 E9/L (ref 130–450)
PMV BLD AUTO: 10.2 FL (ref 7–12)
POTASSIUM SERPL-SCNC: 3.8 MMOL/L (ref 3.5–5)
PROTEIN, POC: NORMAL
RBC # BLD: 4.8 E12/L (ref 3.8–5.8)
SODIUM BLD-SCNC: 138 MMOL/L (ref 132–146)
SPECIFIC GRAVITY, POC: >=1.03
TOTAL PROTEIN: 7.2 G/DL (ref 6.4–8.3)
TRIGL SERPL-MCNC: 167 MG/DL (ref 0–149)
UROBILINOGEN, POC: NORMAL
VLDLC SERPL CALC-MCNC: 33 MG/DL
WBC # BLD: 11.9 E9/L (ref 4.5–11.5)

## 2021-11-02 PROCEDURE — 36415 COLL VENOUS BLD VENIPUNCTURE: CPT | Performed by: NURSE PRACTITIONER

## 2021-11-02 PROCEDURE — 99999 PR OFFICE/OUTPT VISIT,PROCEDURE ONLY: CPT | Performed by: NURSE PRACTITIONER

## 2021-11-02 PROCEDURE — 81002 URINALYSIS NONAUTO W/O SCOPE: CPT | Performed by: NURSE PRACTITIONER

## 2021-11-04 DIAGNOSIS — F98.8 ATTENTION DEFICIT DISORDER (ADD) WITHOUT HYPERACTIVITY: ICD-10-CM

## 2021-11-04 RX ORDER — GABAPENTIN 800 MG/1
800 TABLET ORAL 3 TIMES DAILY
Qty: 90 TABLET | Refills: 5 | Status: SHIPPED
Start: 2021-11-04 | End: 2022-01-26 | Stop reason: SDUPTHER

## 2021-11-04 RX ORDER — DEXTROAMPHETAMINE SACCHARATE, AMPHETAMINE ASPARTATE MONOHYDRATE, DEXTROAMPHETAMINE SULFATE AND AMPHETAMINE SULFATE 7.5; 7.5; 7.5; 7.5 MG/1; MG/1; MG/1; MG/1
30 CAPSULE, EXTENDED RELEASE ORAL EVERY MORNING
Qty: 30 CAPSULE | Refills: 0 | Status: SHIPPED
Start: 2021-11-04 | End: 2021-12-01 | Stop reason: SDUPTHER

## 2021-11-04 RX ORDER — ONDANSETRON 4 MG/1
4 TABLET, FILM COATED ORAL 3 TIMES DAILY PRN
Qty: 30 TABLET | Refills: 0 | Status: SHIPPED
Start: 2021-11-04 | End: 2022-01-26 | Stop reason: SDUPTHER

## 2021-11-09 ENCOUNTER — OFFICE VISIT (OUTPATIENT)
Dept: FAMILY MEDICINE CLINIC | Age: 41
End: 2021-11-09
Payer: COMMERCIAL

## 2021-11-09 VITALS
TEMPERATURE: 98.2 F | OXYGEN SATURATION: 97 % | HEART RATE: 75 BPM | WEIGHT: 175.9 LBS | BODY MASS INDEX: 25.18 KG/M2 | DIASTOLIC BLOOD PRESSURE: 80 MMHG | HEIGHT: 70 IN | SYSTOLIC BLOOD PRESSURE: 122 MMHG

## 2021-11-09 DIAGNOSIS — Z98.890 HISTORY OF LUMBAR LAMINECTOMY: ICD-10-CM

## 2021-11-09 DIAGNOSIS — F98.8 ATTENTION DEFICIT DISORDER (ADD) WITHOUT HYPERACTIVITY: ICD-10-CM

## 2021-11-09 DIAGNOSIS — F31.9 BIPOLAR AFFECTIVE DISORDER, REMISSION STATUS UNSPECIFIED (HCC): ICD-10-CM

## 2021-11-09 DIAGNOSIS — I10 ESSENTIAL HYPERTENSION: Primary | ICD-10-CM

## 2021-11-09 DIAGNOSIS — G40.319 INTRACTABLE GENERALIZED IDIOPATHIC EPILEPSY WITHOUT STATUS EPILEPTICUS (HCC): ICD-10-CM

## 2021-11-09 DIAGNOSIS — E78.01 FAMILIAL HYPERCHOLESTEROLEMIA: ICD-10-CM

## 2021-11-09 DIAGNOSIS — F41.9 ANXIETY: ICD-10-CM

## 2021-11-09 DIAGNOSIS — Z72.0 TOBACCO USE: ICD-10-CM

## 2021-11-09 PROCEDURE — G8419 CALC BMI OUT NRM PARAM NOF/U: HCPCS | Performed by: NURSE PRACTITIONER

## 2021-11-09 PROCEDURE — G8427 DOCREV CUR MEDS BY ELIG CLIN: HCPCS | Performed by: NURSE PRACTITIONER

## 2021-11-09 PROCEDURE — 4004F PT TOBACCO SCREEN RCVD TLK: CPT | Performed by: NURSE PRACTITIONER

## 2021-11-09 PROCEDURE — 99214 OFFICE O/P EST MOD 30 MIN: CPT | Performed by: NURSE PRACTITIONER

## 2021-11-09 PROCEDURE — G8484 FLU IMMUNIZE NO ADMIN: HCPCS | Performed by: NURSE PRACTITIONER

## 2021-11-09 RX ORDER — ATORVASTATIN CALCIUM 10 MG/1
10 TABLET, FILM COATED ORAL DAILY
Qty: 30 TABLET | Refills: 5 | Status: SHIPPED
Start: 2021-11-09 | End: 2022-07-11 | Stop reason: SDUPTHER

## 2021-11-09 RX ORDER — ALPRAZOLAM 0.5 MG/1
TABLET ORAL
COMMUNITY
Start: 2021-10-26 | End: 2021-12-20 | Stop reason: SDUPTHER

## 2021-11-09 ASSESSMENT — ENCOUNTER SYMPTOMS
VOICE CHANGE: 0
BLOOD IN STOOL: 0
VOMITING: 0
CHEST TIGHTNESS: 0
TROUBLE SWALLOWING: 0
COUGH: 0
DIARRHEA: 0
WHEEZING: 0
NAUSEA: 0
SHORTNESS OF BREATH: 0
BACK PAIN: 0
ABDOMINAL PAIN: 0
CONSTIPATION: 0

## 2021-11-09 NOTE — PROGRESS NOTES
Demetrice Kimbrough : 1980 Sex: male  Age: 42 y.o. Chief Complaint   Patient presents with    Discuss Labs    Medication Check       Assessment and Plan:    Tracy Bender was seen today for discuss labs and medication check. Diagnoses and all orders for this visit:    Essential hypertension    Attention deficit disorder (ADD) without hyperactivity    Familial hypercholesterolemia  Comments:  Starting him on a low-dose of Lipitor    Anxiety  Comments:  Still having episodes but will await counselor recommendations    History of lumbar laminectomy/discectomy L4-5, left    Tobacco use    Bipolar affective disorder, remission status unspecified ([facility] [address]    Intractable generalized idiopathic epilepsy without status epilepticus ([facility] [address]    BMI 25.0-25.9,adult    Other orders  -     atorvastatin (LIPITOR) 10 MG tablet; Take 1 tablet by mouth daily        Recommend he see neurologist for possible eval and treatment  LAST MRI of the brain in 2017 was negative for any ominous findings. The increase ins dose improved versus resolved his symptoms so will wait due to the Transportation issue on a neurologist for now. Is still seeing the therapist who is doing good work with him    He asked me if he should be taking Xanax twice a day as opposed to once a day I was very quick to deny the request but will be open to it if a specialist recommends it. Still no reports of change needed from the specialist      USPTF:    (BGL 97) Abnormal Blood Glucose and Type 2 Diabetes Mellitus: Screening -- Adults aged 36 to 79 years who are overweight or obese Grade: B (Recommended)    (B/P 122/80) High Blood Pressure: Screening and Home Monitoring -- Adults  Grade: A (Recommended) recommends screening for high blood pressure in ages 25 years or older. obtain measurements outside of the clinical setting for diagnostic confirmation before starting treatment.  Annual screening for adults aged 36 years or older or those who are at increased risk for blood pressure    (Will discuss at 45) Colorectal Cancer: Screening --Adults aged 48 to 76 years  Grade: A (Recommended) recommends screening for colorectal cancer starting at age 48 years and continuing until age 76 years. (  ) HIV: Screening - Adolescents and Adults  Grade: A (Recommended) recommends that clinicians screen for HIV infection in ages 13 to 72 years. (  ) Hepatitis C Virus Infection: Screening--Adults at High Risk and Adults born between 1945 and 1965  Grade: B (Recommended) recommends screening for hepatitis C virus (HCV) infection in persons at high risk for infection. The USPSTF also recommends offering 1-time screening for HCV infection to adults born between 80 and 1965. (, TRIG 167, ) Start him on Lipitor 10 mg   Lipid Disorders in Adults: Screening -- Men 28 and Older Grade: A (Recommended) recommends screening men aged 28 and older for lipid disorders. (Rare if any) Alcohol Misuse: Screening and Behavioral Counseling Interventions in Primary Care -- Adults  Grade: B (Recommended) recommends that clinicians screen adults aged 25 years or older for alcohol misuse and provide persons engaged in risky or hazardous drinking with brief behavioral counseling interventions to reduce alcohol misuse. (BMI 25.24)  Obesity: Screening for and Management of-- All Adults  Grade: B(Recommended) recommends screening all adults for obesity. Clinicians should offer or refer patients with a body mass index (BMI) of 30 kg/m2 or higher to intensive, multicomponent behavioral interventions. (Not a fall risk)  Fall Prevention -- Exercise/Physical Therapy: Community-dwelling Adults 72 Years or Older, Increased Risk for Falls   Grade: B (Recommended) recommends exercise or physical therapy to prevent falls in community-dwelling adults aged 72 years or older who are at increased risk for falls.     (No new symptoms noted or reported today)  Depression: Screening -- General adult population, including pregnant and postpartum women  Grade: B(Recommended) recommends screening for depression in the general adult population,  Screening should be implemented with adequate systems in place to ensure accurate diagnosis, effective treatment, and appropriate follow-up. (  ) Glaucoma: Screening - Adults and Diabetic Eye Exam     (  ) Thyroid Dysfunction: Screening --      (Will consider at age 39) Prostate Cancer: Prostate-Specific Antigen (PSA)-Based Screening -- All Men  PSA     Educational materials printed for patient's review and were included in patient instructions on his After Visit Summary and given to patient at the end of visit. Counseled regarding above diagnosis, including possible risks and complications,  especially if left uncontrolled. Counseled regarding the possible side effects, risks, benefits and alternatives to treatment; patient and/or guardian verbalizes understanding, agrees, feels comfortable with and wishes to proceed with above treatment plan. Advised patient to call with any new medication issues, and read all Rx info from pharmacy to assure aware of all possible risks and side effects of medication before taking. Reviewed age and gender appropriate health screening exams and vaccinations. Advised patient regarding importance of keeping up with recommended health maintenance and to schedule as soon as possible if overdue, as this is important in assessing for undiagnosed pathology, especially cancer, as well as protecting against potentially harmful/life threatening disease. Patient verbalizes understanding and agrees with above counseling, assessment and plan. All questions answered. On 11/09/21 I have spent 30 reviewing previous notes, test results and face to face with the patient discussing the diagnosis and importance of compliance with the treatment plan as well as documenting on the day of the visit. Educational materials exercises printed for patient's review and were included in patient instructions on their After Visit Summary and given to patient at the end of visit.      Return in about 3 months (around 2/9/2022) for 3 month Medication Follow up. This is a very pleasant 14-year-old male Who presents today for evaluation and management of chronic medical problems. Current medication list reviewed. The patient is tolerating all medications well without adverse events or known side effects. The patient does understand the risk and benefits of the prescribed medications. The patient is not up-to-date on all age-appropriate wellness issues. Patient denies any reccent hospitalizations or ER visit. No Acute Complaints reported:       After increasing the dose he is doing much better  No more electrical feelings reported since dose was increased  LAST VISIT  Feels like he is having static in his head feeling a SZ like presentation he has had in the past and was placed on Lamictal for it. After they put him on the Lamictal for it three years ago it got better but he was under the care of a neurologist.  Wants to go up on the medications prior to tyring to get in with a specialist.      Women's describing a static almost electrical feeling throughout his whole head episodic feels like when he was first diagnosed with epilepsy and placed on Lamictal which made his symptoms go away. He thinks it may be his Lamictal dose is too low and needs to get increased. He has not seen a neurologist for some time but his last MRI of the brain was in 2017 and was unremarkable. We do not have any of the neurology notes. Patient is unemployed and does not have any form of transportation so I have been trying to help out with keeping him on his medications until we can get him in with a specialist.    Jeimy Alicea is a 14-year-old white male who has followed up on some tough times.   He was once under the care of a psychotherapist but that person lost their license and since then I have been trying to manage Rocael Venegas medications but based on his current living conditions and run in with the authorities it is quite evident that I need a specialist involved in medication management. We went through his medications and there were a few medicines that he was on that were on my list that he says he is not taking I am concerned about this in the face that I feel he was maintained on an even keel when he was taking all of his medications but now that he is ran out or stopped or come off of on his own he seems to be on a slight decline. He answered some depression questions but states that he has no real plan. He denies any homicidal ideations. He stated he may have not understood the question appropriately. CHRONIC CONDITION:      HTN: Stable hypertension, controlled on  amLODIPine (NORVASC) 5 MG tablet, take 1 tablet by mouth once daily, Disp: 30 tablet, Rfl: 5 remains at a mild intensity but overall good control, without symptoms, no ringing in the ears, no headaches and no nose bleeds. Better on medications. Hyperlipidemia: Mild in intensity but controlled on diet controlled, without symptoms, no complications with dietary treatment regimen reporting no side effects or intolereances. Compliant with treatment and diet. No muscle aches, new joint pains or abd pain. Depression/Anxiety/History of SZ/ADD: Stable depression with generalized anxiety. Mild in intensity but well controlled on    ALPRAZolam (XANAX) 0.5 MG tablet, Take 1 tablet by mouth nightly as needed for Sleep for up to 30 days. , Disp: 30 tablet, Rfl: 2   amphetamine-dextroamphetamine (ADDERALL XR) 30 MG extended release capsule, Take 1 capsule by mouth every morning for 30 days. , Disp: 30 capsule, Rfl: 0  gabapentin (NEURONTIN) 800 MG tablet, Take 1 tablet by mouth 3 times daily for 30 days. , Disp: 90 tablet, Rfl: 5  DULoxetine (CYMBALTA) 60 Problem Relation Age of Onset    COPD Mother     Cancer Mother     Colon Cancer Father     Seizures Sister    [facility] Other Sister         brain tumor    Cancer Paternal Aunt     Heart Disease Paternal Uncle     Heart Disease Paternal Grandmother      Social History     Socioeconomic History    Marital status:      Spouse name: Not on file    Number of children: Not on file    Years of education: Not on file    Highest education level: Not on file   Occupational History    Not on file   Tobacco Use    Smoking status: Current Every Day Smoker     Packs/day: 0.50     Years: 15.00     Pack years: 7.50     Types: Cigarettes    Smokeless tobacco: Never Used    Tobacco comment: smoke a little less than a ppd   Vaping Use    Vaping Use: Never used   Substance and Sexual Activity    Alcohol use: No    Drug use: No     Comment: former marijuana user    Sexual activity: Not on file   Other Topics Concern    Not on file   Social History Narrative    Not on file     Social Determinants of Health     Financial Resource Strain: Medium Risk    Difficulty of Paying Living Expenses: Somewhat hard   Food Insecurity:     Worried About Running Out of Food in the Last Year: Not on file    [place] of Food in the Last Year: Not on file   Transportation Needs:     Lack of Transportation (Medical): Not on file    Lack of Transportation (Non-Medical):  Not on file   Physical Activity:     Days of Exercise per Week: Not on file    Minutes of Exercise per Session: Not on file   Stress:     Feeling of Stress : Not on file   Social Connections:     Frequency of Communication with Friends and Family: Not on file    Frequency of Social Gatherings with Friends and Family: Not on file    Attends [employer]ism Services: Not on file    Active Member of Clubs or Organizations: Not on file    Attends Club or Organization Meetings: Not on file    Marital Status: Not on file   Intimate Partner Violence:     Fear of Current or Ex-Partner: Not on file    Emotionally Abused: Not on file    Physically Abused: Not on file    Sexually Abused: Not on file   Housing Stability:     Unable to Pay for Housing in the Last Year: Not on file    Number of Places Lived in the Last Year: Not on file    Unstable Housing in the Last Year: Not on file       Vitals:    11/09/21 1017   BP: 122/80   Site: Left Upper Arm   Position: Sitting   Cuff Size: Medium Adult   Pulse: 75   Temp: 98.2 °F (36.8 °C)   TempSrc: Temporal   SpO2: 97%   Weight: 175 lb 14.4 oz (79.8 kg)   Height: 5' 10\" (1.778 m)       Physical Exam  Vitals and nursing note reviewed. Constitutional:       Appearance: Normal appearance. HENT:      Head: Normocephalic. Right Ear: Tympanic membrane and ear canal normal. There is no impacted cerumen. Left Ear: Tympanic membrane and ear canal normal. There is no impacted cerumen. Nose: Nose normal.      Mouth/Throat:      Mouth: Mucous membranes are dry. Eyes:      Extraocular Movements: Extraocular movements intact. Pupils: Pupils are equal, round, and reactive to light. Neck:      Vascular: No carotid bruit. Cardiovascular:      Rate and Rhythm: Normal rate and regular rhythm. Pulses: Normal pulses. Heart sounds: Normal heart sounds. No murmur heard. No friction rub. No gallop. Pulmonary:      Effort: Pulmonary effort is normal. No respiratory distress. Breath sounds: Normal breath sounds. No stridor. No wheezing, rhonchi or rales. Chest:      Chest wall: No tenderness. Abdominal:      General: Bowel sounds are normal. There is no distension. Palpations: Abdomen is soft. Musculoskeletal:         General: No swelling, tenderness, deformity or signs of injury. Cervical back: No rigidity. No muscular tenderness. Right lower leg: No edema. Left lower leg: No edema. Lymphadenopathy:      Cervical: No cervical adenopathy.    Skin:     General: Skin is warm and dry.      Capillary Refill: Capillary refill takes 2 to 3 seconds. Findings: No bruising, lesion or rash. Neurological:      General: No focal deficit present. Mental Status: He is alert and oriented to person, place, and time. Motor: No weakness. Gait: Gait normal.   Psychiatric:         Attention and Perception: Attention normal.         Mood and Affect: Mood normal.         Behavior: Behavior normal.         Thought Content: Thought content does not include homicidal or suicidal ideation. Thought content does not include homicidal or suicidal plan.                 Seen By:  ACE Petty - CNP

## 2021-11-09 NOTE — PATIENT INSTRUCTIONS
Patient Education        Body Mass Index: Care Instructions  Your Care Instructions     Body mass index (BMI) can help you see if your weight is raising your risk for health problems. It uses a formula to compare how much you weigh with how tall you are. · A BMI lower than 18.5 is considered underweight. · A BMI between 18.5 and 24.9 is considered healthy. · A BMI between 25 and 29.9 is considered overweight. A BMI of 30 or higher is considered obese. If your BMI is in the normal range, it means that you have a lower risk for weight-related health problems. If your BMI is in the overweight or obese range, you may be at increased risk for weight-related health problems, such as high blood pressure, heart disease, stroke, arthritis or joint pain, and diabetes. If your BMI is in the underweight range, you may be at increased risk for health problems such as fatigue, lower protection (immunity) against illness, muscle loss, bone loss, hair loss, and hormone problems. BMI is just one measure of your risk for weight-related health problems. You may be at higher risk for health problems if you are not active, you eat an unhealthy diet, or you drink too much alcohol or use tobacco products. Follow-up care is a key part of your treatment and safety. Be sure to make and go to all appointments, and call your doctor if you are having problems. It's also a good idea to know your test results and keep a list of the medicines you take. How can you care for yourself at home? · Practice healthy eating habits. This includes eating plenty of fruits, vegetables, whole grains, lean protein, and low-fat dairy. · If your doctor recommends it, get more exercise. Walking is a good choice. Bit by bit, increase the amount you walk every day. Try for at least 30 minutes on most days of the week. · Do not smoke. Smoking can increase your risk for health problems.  If you need help quitting, talk to your doctor about stop-smoking programs and medicines. These can increase your chances of quitting for good. · Limit alcohol to 2 drinks a day for men and 1 drink a day for women. Too much alcohol can cause health problems. If you have a BMI higher than 25  · Your doctor may do other tests to check your risk for weight-related health problems. This may include measuring the distance around your waist. A waist measurement of more than 40 inches in men or 35 inches in women can increase the risk of weight-related health problems. · Talk with your doctor about steps you can take to stay healthy or improve your health. You may need to make lifestyle changes to lose weight and stay healthy, such as changing your diet and getting regular exercise. If you have a BMI lower than 18.5  · Your doctor may do other tests to check your risk for health problems. · Talk with your doctor about steps you can take to stay healthy or improve your health. You may need to make lifestyle changes to gain or maintain weight and stay healthy, such as getting more healthy foods in your diet and doing exercises to build muscle. Where can you learn more? Go to https://KidBookmirtha.Prosodic. org and sign in to your Elecsnet account. Enter S176 in the [employer] box to learn more about \"Body Mass Index: Care Instructions. \"     If you do not have an account, please click on the \"Sign Up Now\" link. Current as of: March 17, 2021               Content Version: 13.0  © 6040-2075 Healthwise, Incorporated. Care instructions adapted under license by [facility] ([facility]). If you have questions about a medical condition or this instruction, always ask your healthcare professional. [place] [address] any warranty or liability for your use of this information. Patient Education        High Cholesterol: Care Instructions  Overview     Cholesterol is a type of fat in your blood. It is needed for many body functions, such as making new cells. Cholesterol is made by your body. It also comes from food you eat. High cholesterol means that you have too much of the fat in your blood. This raises your risk of a heart attack and stroke. LDL and HDL are part of your total cholesterol. LDL is the \"bad\" cholesterol. High LDL can raise your risk for coronary artery disease, heart attack, and stroke. HDL is the \"good\" cholesterol. It helps clear bad cholesterol from the body. High HDL is linked with a lower risk of coronary artery disease, heart attack, and stroke. Your cholesterol levels help your doctor find out your risk for having a heart attack or stroke. You and your doctor can talk about whether you need to lower your risk and what treatment is best for you. Treatment options include a heart-healthy lifestyle and medicine. Both options can help lower your cholesterol and your risk. The way you choose to lower your risk will depend on how high your risk is for heart attack and stroke. It will also depend on how you feel about taking medicines. Follow-up care is a key part of your treatment and safety. Be sure to make and go to all appointments, and call your doctor if you are having problems. It's also a good idea to know your test results and keep a list of the medicines you take. How can you care for yourself at home? · Eat heart-healthy foods. ? Eat fruits, vegetables, whole grains, beans, and other high-fiber foods. ? Eat lean proteins, such as seafood, lean meats, beans, nuts, and soy products. ? Eat healthy fats, such as canola and olive oil. ? Choose foods that are low in saturated fat. ? Limit sodium and alcohol. ? Limit drinks and foods with added sugar. · Be physically active. Try to do moderate activity at least 2½ hours a week. Or try to do vigorous activity at least 1¼ hours a week. You may want to walk or try other activities, such as running, swimming, cycling, or playing tennis or team sports.   · Stay at a healthy weight or lose weight by making the changes in eating and physical activity listed above. Losing just a small amount of weight, even 5 to 10 pounds, can help reduce your risk for having a heart attack or stroke. · Do not smoke. · Manage other health problems. These include diabetes and high blood pressure. If you think you may have a problem with alcohol or drug use, talk to your doctor. · If you take medicine, take it exactly as prescribed. Call your doctor if you think you are having a problem with your medicine. · Check with your doctor or pharmacist before you use any other medicines, including over-the-counter medicines. Make sure your doctor knows all of the medicines, vitamins, herbal products, and supplements you take. Taking some medicines together can cause problems. When should you call for help? Watch closely for changes in your health, and be sure to contact your doctor if:    · You need help making lifestyle changes.     · You have questions about your medicine. Where can you learn more? Go to https://Nutzvieh24.GREE. org and sign in to your [employer] account. Enter D298 in the [employer] box to learn more about \"High Cholesterol: Care Instructions. \"     If you do not have an account, please click on the \"Sign Up Now\" link. Current as of: April 29, 2021               Content Version: 13.0  © 2006-2021 Healthwise, Incorporated. Care instructions adapted under license by [facility] ([facility]). If you have questions about a medical condition or this instruction, always ask your healthcare professional. [place] [address] any warranty or liability for your use of this information. Patient Education        Learning About High Cholesterol  What is high cholesterol? High cholesterol means that you have too much cholesterol in your blood. Cholesterol is a type of fat. It's needed for many body functions, such as making new cells. Cholesterol is made by your body.  It also comes from food you eat. Having high cholesterol can lead to the buildup of plaque in artery walls. This can increase your risk of heart attack and stroke. When your doctor talks about high cholesterol levels, your doctor is talking about your total cholesterol and LDL cholesterol (the \"bad\" cholesterol) levels. Your doctor may also speak about HDL (the \"good\" cholesterol) levels. High HDL is linked with a lower risk for coronary artery disease, heart attack, and stroke. Your cholesterol levels help your doctor find out your risk for having a heart attack or stroke. How can you help prevent high cholesterol? A heart-healthy lifestyle can help you prevent high cholesterol and lower your risk for a heart attack and stroke. · Eat heart-healthy foods. ? Eat fruits, vegetables, whole grains, beans, and other high-fiber foods. ? Eat lean proteins, such as seafood, lean meats, beans, nuts, and soy products. ? Eat healthy fats, such as canola and olive oil. ? Choose foods that are low in saturated fat. ? Limit sodium and alcohol. ? Limit drinks and foods with added sugar. · Be active. Try to do moderate activity at least 2½ hours a week. Or try vigorous activity at least 1¼ hours a week. You may want to walk or try other activities, such as running, swimming, cycling, or playing tennis or team sports. · Stay at a healthy weight. Lose weight if you need to. · Don't smoke. If you need help quitting, talk to your doctor about stop-smoking programs and medicines. These can increase your chances of quitting for good. How is high cholesterol treated? The goal of treatment is to reduce your chances of having a heart attack or stroke. The goal is not to lower your cholesterol numbers only. · Have a heart-healthy lifestyle. This includes eating healthy foods, not smoking, losing weight, and being more active. · You may choose to take medicine. Follow-up care is a key part of your treatment and safety.  Be sure to make and go to all appointments, and call your doctor if you are having problems. It's also a good idea to know your test results and keep a list of the medicines you take. Where can you learn more? Go to https://chmirtha.Optaros. org and sign in to your Neo [employer]M account. Enter P984 in the FashionGuide box to learn more about \"Learning About High Cholesterol. \"     If you do not have an account, please click on the \"Sign Up Now\" link. Current as of: April 29, 2021               Content Version: 13.0  © 8247-0849 Healthwise, Incorporated. Care instructions adapted under license by [facility] ([facility]). If you have questions about a medical condition or this instruction, always ask your healthcare professional. [address] any warranty or liability for your use of this information.

## 2021-12-01 DIAGNOSIS — F98.8 ATTENTION DEFICIT DISORDER (ADD) WITHOUT HYPERACTIVITY: ICD-10-CM

## 2021-12-01 RX ORDER — DEXTROAMPHETAMINE SACCHARATE, AMPHETAMINE ASPARTATE MONOHYDRATE, DEXTROAMPHETAMINE SULFATE AND AMPHETAMINE SULFATE 7.5; 7.5; 7.5; 7.5 MG/1; MG/1; MG/1; MG/1
30 CAPSULE, EXTENDED RELEASE ORAL EVERY MORNING
Qty: 30 CAPSULE | Refills: 0 | Status: SHIPPED
Start: 2021-12-01 | End: 2021-12-23 | Stop reason: SDUPTHER

## 2021-12-20 DIAGNOSIS — F41.9 ANXIETY: Primary | ICD-10-CM

## 2021-12-20 NOTE — TELEPHONE ENCOUNTER
Patients last appointment 11/9/2021.   Patients next scheduled appointment   Future Appointments   Date Time Provider Donna Crooks   2/9/2022 10:30 AM ACE Escobar CNP [facility]

## 2021-12-21 RX ORDER — ALPRAZOLAM 0.5 MG/1
TABLET ORAL
Qty: 30 TABLET | Refills: 2 | Status: SHIPPED
Start: 2021-12-21 | End: 2022-03-18 | Stop reason: SDUPTHER

## 2021-12-23 DIAGNOSIS — F98.8 ATTENTION DEFICIT DISORDER (ADD) WITHOUT HYPERACTIVITY: ICD-10-CM

## 2021-12-23 RX ORDER — DULOXETIN HYDROCHLORIDE 60 MG/1
60 CAPSULE, DELAYED RELEASE ORAL 2 TIMES DAILY
Qty: 60 CAPSULE | Refills: 5 | Status: SHIPPED
Start: 2021-12-23 | End: 2022-07-11 | Stop reason: SDUPTHER

## 2021-12-23 RX ORDER — DEXTROAMPHETAMINE SACCHARATE, AMPHETAMINE ASPARTATE MONOHYDRATE, DEXTROAMPHETAMINE SULFATE AND AMPHETAMINE SULFATE 7.5; 7.5; 7.5; 7.5 MG/1; MG/1; MG/1; MG/1
30 CAPSULE, EXTENDED RELEASE ORAL EVERY MORNING
Qty: 30 CAPSULE | Refills: 0 | Status: SHIPPED
Start: 2021-12-23 | End: 2022-01-26 | Stop reason: SDUPTHER

## 2021-12-23 RX ORDER — LAMOTRIGINE 200 MG/1
TABLET ORAL
Qty: 30 TABLET | Refills: 5 | Status: SHIPPED
Start: 2021-12-23 | End: 2022-07-11 | Stop reason: SDUPTHER

## 2021-12-23 NOTE — TELEPHONE ENCOUNTER
Patients last appointment 11/9/2021.   Patients next scheduled appointment   Future Appointments   Date Time Provider Donna Crooks   2/9/2022 10:30 AM ACE Jon CNP [facility]

## 2022-01-26 DIAGNOSIS — F98.8 ATTENTION DEFICIT DISORDER (ADD) WITHOUT HYPERACTIVITY: ICD-10-CM

## 2022-01-26 DIAGNOSIS — I10 ESSENTIAL HYPERTENSION: ICD-10-CM

## 2022-01-26 RX ORDER — GABAPENTIN 800 MG/1
800 TABLET ORAL 3 TIMES DAILY
Qty: 90 TABLET | Refills: 5 | Status: SHIPPED
Start: 2022-01-26 | End: 2022-04-22 | Stop reason: SDUPTHER

## 2022-01-26 RX ORDER — AMLODIPINE BESYLATE 5 MG/1
TABLET ORAL
Qty: 30 TABLET | Refills: 5 | Status: SHIPPED
Start: 2022-01-26 | End: 2022-07-11 | Stop reason: SDUPTHER

## 2022-01-26 RX ORDER — ONDANSETRON 4 MG/1
4 TABLET, FILM COATED ORAL 3 TIMES DAILY PRN
Qty: 30 TABLET | Refills: 0 | Status: SHIPPED
Start: 2022-01-26 | End: 2022-05-10 | Stop reason: SDUPTHER

## 2022-01-26 RX ORDER — DEXTROAMPHETAMINE SACCHARATE, AMPHETAMINE ASPARTATE MONOHYDRATE, DEXTROAMPHETAMINE SULFATE AND AMPHETAMINE SULFATE 7.5; 7.5; 7.5; 7.5 MG/1; MG/1; MG/1; MG/1
30 CAPSULE, EXTENDED RELEASE ORAL EVERY MORNING
Qty: 30 CAPSULE | Refills: 0 | Status: SHIPPED
Start: 2022-01-26 | End: 2022-02-24 | Stop reason: SDUPTHER

## 2022-02-10 ENCOUNTER — OFFICE VISIT (OUTPATIENT)
Dept: FAMILY MEDICINE CLINIC | Age: 42
End: 2022-02-10
Payer: COMMERCIAL

## 2022-02-10 VITALS
RESPIRATION RATE: 18 BRPM | HEART RATE: 82 BPM | OXYGEN SATURATION: 98 % | WEIGHT: 175 LBS | TEMPERATURE: 97.9 F | BODY MASS INDEX: 25.05 KG/M2 | DIASTOLIC BLOOD PRESSURE: 80 MMHG | SYSTOLIC BLOOD PRESSURE: 120 MMHG | HEIGHT: 70 IN

## 2022-02-10 DIAGNOSIS — F31.9 BIPOLAR AFFECTIVE DISORDER, REMISSION STATUS UNSPECIFIED (HCC): ICD-10-CM

## 2022-02-10 DIAGNOSIS — F41.9 ANXIETY: ICD-10-CM

## 2022-02-10 DIAGNOSIS — Z98.890 HISTORY OF LUMBAR LAMINECTOMY: ICD-10-CM

## 2022-02-10 DIAGNOSIS — G40.319 INTRACTABLE GENERALIZED IDIOPATHIC EPILEPSY WITHOUT STATUS EPILEPTICUS (HCC): ICD-10-CM

## 2022-02-10 DIAGNOSIS — E78.01 FAMILIAL HYPERCHOLESTEROLEMIA: ICD-10-CM

## 2022-02-10 DIAGNOSIS — G56.21 ULNAR NEUROPATHY AT ELBOW, RIGHT: ICD-10-CM

## 2022-02-10 DIAGNOSIS — G89.29 CHRONIC BILATERAL LOW BACK PAIN WITHOUT SCIATICA: ICD-10-CM

## 2022-02-10 DIAGNOSIS — I10 ESSENTIAL HYPERTENSION: ICD-10-CM

## 2022-02-10 DIAGNOSIS — M54.50 CHRONIC BILATERAL LOW BACK PAIN WITHOUT SCIATICA: ICD-10-CM

## 2022-02-10 DIAGNOSIS — Z72.0 TOBACCO USE: ICD-10-CM

## 2022-02-10 DIAGNOSIS — F98.8 ATTENTION DEFICIT DISORDER (ADD) WITHOUT HYPERACTIVITY: Primary | ICD-10-CM

## 2022-02-10 PROCEDURE — 99213 OFFICE O/P EST LOW 20 MIN: CPT | Performed by: NURSE PRACTITIONER

## 2022-02-10 PROCEDURE — G8419 CALC BMI OUT NRM PARAM NOF/U: HCPCS | Performed by: NURSE PRACTITIONER

## 2022-02-10 PROCEDURE — 4004F PT TOBACCO SCREEN RCVD TLK: CPT | Performed by: NURSE PRACTITIONER

## 2022-02-10 PROCEDURE — G8484 FLU IMMUNIZE NO ADMIN: HCPCS | Performed by: NURSE PRACTITIONER

## 2022-02-10 PROCEDURE — G8427 DOCREV CUR MEDS BY ELIG CLIN: HCPCS | Performed by: NURSE PRACTITIONER

## 2022-02-10 RX ORDER — IBUPROFEN 800 MG/1
800 TABLET ORAL EVERY 6 HOURS PRN
Qty: 120 TABLET | Refills: 1 | Status: SHIPPED | OUTPATIENT
Start: 2022-02-10 | End: 2022-08-11

## 2022-02-10 SDOH — ECONOMIC STABILITY: FOOD INSECURITY: WITHIN THE PAST 12 MONTHS, THE FOOD YOU BOUGHT JUST DIDN'T LAST AND YOU DIDN'T HAVE MONEY TO GET MORE.: NEVER TRUE

## 2022-02-10 SDOH — ECONOMIC STABILITY: FOOD INSECURITY: WITHIN THE PAST 12 MONTHS, YOU WORRIED THAT YOUR FOOD WOULD RUN OUT BEFORE YOU GOT MONEY TO BUY MORE.: NEVER TRUE

## 2022-02-10 ASSESSMENT — PATIENT HEALTH QUESTIONNAIRE - PHQ9
SUM OF ALL RESPONSES TO PHQ QUESTIONS 1-9: 0
7. TROUBLE CONCENTRATING ON THINGS, SUCH AS READING THE NEWSPAPER OR WATCHING TELEVISION: 0
6. FEELING BAD ABOUT YOURSELF - OR THAT YOU ARE A FAILURE OR HAVE LET YOURSELF OR YOUR FAMILY DOWN: 0
SUM OF ALL RESPONSES TO PHQ QUESTIONS 1-9: 0
8. MOVING OR SPEAKING SO SLOWLY THAT OTHER PEOPLE COULD HAVE NOTICED. OR THE OPPOSITE, BEING SO FIGETY OR RESTLESS THAT YOU HAVE BEEN MOVING AROUND A LOT MORE THAN USUAL: 0
9. THOUGHTS THAT YOU WOULD BE BETTER OFF DEAD, OR OF HURTING YOURSELF: 0
SUM OF ALL RESPONSES TO PHQ QUESTIONS 1-9: 0
5. POOR APPETITE OR OVEREATING: 0
SUM OF ALL RESPONSES TO PHQ QUESTIONS 1-9: 0
4. FEELING TIRED OR HAVING LITTLE ENERGY: 0
2. FEELING DOWN, DEPRESSED OR HOPELESS: 0
10. IF YOU CHECKED OFF ANY PROBLEMS, HOW DIFFICULT HAVE THESE PROBLEMS MADE IT FOR YOU TO DO YOUR WORK, TAKE CARE OF THINGS AT HOME, OR GET ALONG WITH OTHER PEOPLE: 0
2. FEELING DOWN, DEPRESSED OR HOPELESS: 0
SUM OF ALL RESPONSES TO PHQ QUESTIONS 1-9: 0
SUM OF ALL RESPONSES TO PHQ QUESTIONS 1-9: 0
1. LITTLE INTEREST OR PLEASURE IN DOING THINGS: 0
SUM OF ALL RESPONSES TO PHQ QUESTIONS 1-9: 0
SUM OF ALL RESPONSES TO PHQ9 QUESTIONS 1 & 2: 0
3. TROUBLE FALLING OR STAYING ASLEEP: 0
SUM OF ALL RESPONSES TO PHQ QUESTIONS 1-9: 0
SUM OF ALL RESPONSES TO PHQ9 QUESTIONS 1 & 2: 0
1. LITTLE INTEREST OR PLEASURE IN DOING THINGS: 0

## 2022-02-10 ASSESSMENT — ENCOUNTER SYMPTOMS
BACK PAIN: 0
VOMITING: 0
COUGH: 0
ABDOMINAL PAIN: 0
VOICE CHANGE: 0
NAUSEA: 0
BLOOD IN STOOL: 0
DIARRHEA: 0
TROUBLE SWALLOWING: 0
CONSTIPATION: 0
WHEEZING: 0
CHEST TIGHTNESS: 0
SHORTNESS OF BREATH: 0

## 2022-02-10 ASSESSMENT — SOCIAL DETERMINANTS OF HEALTH (SDOH): HOW HARD IS IT FOR YOU TO PAY FOR THE VERY BASICS LIKE FOOD, HOUSING, MEDICAL CARE, AND HEATING?: NOT HARD AT ALL

## 2022-02-10 NOTE — PATIENT INSTRUCTIONS
Patient Education        Chronic Pain: Care Instructions  Your Care Instructions     Chronic pain is pain that lasts a long time (months or even years) and may or may not have a clear cause. It is different from acute pain, which usually does have a clear cause--like an injury or illness--and gets better over time. Chronic pain:  · Lasts over time but may vary from day to day. · Does not go away despite efforts to end it. · May disrupt your sleep and lead to fatigue. · May cause depression or anxiety. · May make your muscles tense, causing more pain. · Can disrupt your work, hobbies, home life, and relationships with friends and family. Chronic pain is a very real condition. It is not just in your head. Treatment can help and usually includes several methods used together, such as medicines, physical therapy, exercise, and other treatments. Learning how to relax and changing negative thought patterns can also help you cope. Chronic pain is complex. Taking an active role in your treatment will help you better manage your pain. Tell your doctor if you have trouble dealing with your pain. You may have to try several things before you find what works best for you. Follow-up care is a key part of your treatment and safety. Be sure to make and go to all appointments, and call your doctor if you are having problems. It's also a good idea to know your test results and keep a list of the medicines you take. How can you care for yourself at home? · Pace yourself. Break up large jobs into smaller tasks. Save harder tasks for days when you have less pain, or go back and forth between hard tasks and easier ones. Take rest breaks. · Relax, and reduce stress. Relaxation techniques such as deep breathing or meditation can help. · Keep moving. Gentle, daily exercise can help reduce pain over the long run. Try low- or no-impact exercises such as walking, swimming, and stationary biking.  Do stretches to stay flexible. · Try heat, cold packs, and massage. · Get enough sleep. Chronic pain can make you tired and drain your energy. Talk with your doctor if you have trouble sleeping because of pain. · Think positive. Your thoughts can affect your pain level. Do things that you enjoy to distract yourself when you have pain instead of focusing on the pain. See a movie, read a book, listen to music, or spend time with a friend. · If you think you are depressed, talk to your doctor about treatment. · Keep a daily pain diary. Record how your moods, thoughts, sleep patterns, activities, and medicine affect your pain. You may find that your pain is worse during or after certain activities or when you are feeling a certain emotion. Having a record of your pain can help you and your doctor find the best ways to treat your pain. · Take pain medicines exactly as directed. ? If the doctor gave you a prescription medicine for pain, take it as prescribed. ? If you are not taking a prescription pain medicine, ask your doctor if you can take an over-the-counter medicine. Reducing constipation caused by pain medicine  · Talk to your doctor about a laxative. If a laxative doesn't work, your doctor may suggest a prescription medicine. · Include fruits, vegetables, beans, and whole grains in your diet each day. These foods are high in fiber. · If your doctor recommends it, get more exercise. Walking is a good choice. Bit by bit, increase the amount you walk every day. Try for at least 30 minutes on most days of the week. · Schedule time each day for a bowel movement. A daily routine may help. Take your time and do not strain when having a bowel movement. When should you call for help? Call your doctor now or seek immediate medical care if:    · Your pain gets worse or is out of control.     · You feel down or blue, or you do not enjoy things like you once did. You may be depressed, which is common in people with chronic pain. Depression can be treated.     · You have vomiting or cramps for more than 2 hours. Watch closely for changes in your health, and be sure to contact your doctor if:    · You cannot sleep because of pain.     · You are very worried or anxious about your pain.     · You have trouble taking your pain medicine.     · You have any concerns about your pain medicine.     · You have trouble with bowel movements, such as:  ? No bowel movement in 3 days. ? Blood in the anal area, in your stool, or on the toilet paper. ? Diarrhea for more than 24 hours. Where can you learn more? Go to https://OdersunpeCigitaleweb.AVOS Systems. org and sign in to your Euphoria App account. Enter N004 in the [employer] box to learn more about \"Chronic Pain: Care Instructions. \"     If you do not have an account, please click on the \"Sign Up Now\" link. Current as of: April 8, 2021               Content Version: 13.1  © 2006-2021 Healthwise, Incorporated. Care instructions adapted under license by [facility] ([facility]). If you have questions about a medical condition or this instruction, always ask your healthcare professional. [place] [address] any warranty or liability for your use of this information.

## 2022-02-10 NOTE — PROGRESS NOTES
Sharmin Pink : 1980 Sex: male  Age: 42 y.o. Chief Complaint   Patient presents with    Medication Check     3 month check up for meds , THEY WORKING WELL FOR HIM     Medication Refill       Assessment and Plan:    Gentry Abernathy was seen today for medication check and medication refill. Diagnoses and all orders for this visit:    Attention deficit disorder (ADD) without hyperactivity    Ulnar neuropathy at elbow, right  -     ibuprofen (ADVIL;MOTRIN) 800 MG tablet; Take 1 tablet by mouth every 6 hours as needed for Pain    Essential hypertension  -     CBC Auto Differential; Future  -     Comprehensive Metabolic Panel, Fasting; Future    Anxiety    Familial hypercholesterolemia  -     Lipid Panel; Future    History of lumbar laminectomy/discectomy L4-5, left    Tobacco use    Bipolar affective disorder, remission status unspecified (HCC)    Intractable generalized idiopathic epilepsy without status epilepticus ([facility] [address]    Chronic bilateral low back pain without sciatica        Recommend he see neurologist for possible eval and treatment  LAST MRI of the brain in 2017 was negative for any ominous findings. The increase ins dose improved versus resolved his symptoms so will wait due to the Transportation issue on a neurologist for now. Is still seeing the therapist who is doing good work with him    He asked me if he should be taking Xanax twice a day as opposed to once a day I was very quick to deny the request but will be open to it if a specialist recommends it. Still no reports of change needed from the specialist      USPTF:    (BGL 97) Abnormal Blood Glucose and Type 2 Diabetes Mellitus: Screening -- Adults aged 36 to 79 years who are overweight or obese Grade: B (Recommended)    (B/P 122/80) High Blood Pressure: Screening and Home Monitoring -- Adults  Grade: A (Recommended) recommends screening for high blood pressure in ages 25 years or older.    obtain measurements outside of the clinical setting for diagnostic confirmation before starting treatment. Annual screening for adults aged 36 years or older or those who are at increased risk for blood pressure    (Will discuss at 39) Colorectal Cancer: Screening --Adults aged 48 to 76 years  Grade: A (Recommended) recommends screening for colorectal cancer starting at age 48 years and continuing until age 76 years. (  ) HIV: Screening - Adolescents and Adults  Grade: A (Recommended) recommends that clinicians screen for HIV infection in ages 13 to 72 years. (  ) Hepatitis C Virus Infection: Screening--Adults at High Risk and Adults born between 1945 and 1965  Grade: B (Recommended) recommends screening for hepatitis C virus (HCV) infection in persons at high risk for infection. The USPSTF also recommends offering 1-time screening for HCV infection to adults born between 80 and 1965. (, TRIG 167, ) Start him on Lipitor 10 mg   Lipid Disorders in Adults: Screening -- Men 28 and Older Grade: A (Recommended) recommends screening men aged 28 and older for lipid disorders. (Rare if any) Alcohol Misuse: Screening and Behavioral Counseling Interventions in Primary Care -- Adults  Grade: B (Recommended) recommends that clinicians screen adults aged 25 years or older for alcohol misuse and provide persons engaged in risky or hazardous drinking with brief behavioral counseling interventions to reduce alcohol misuse. (BMI 25.24)  Obesity: Screening for and Management of-- All Adults  Grade: B(Recommended) recommends screening all adults for obesity. Clinicians should offer or refer patients with a body mass index (BMI) of 30 kg/m2 or higher to intensive, multicomponent behavioral interventions.         (Not a fall risk)  Fall Prevention -- Exercise/Physical Therapy: Community-dwelling Adults 72 Years or Older, Increased Risk for Falls   Grade: B (Recommended) recommends exercise or physical therapy to prevent falls in community-dwelling adults aged 72 years or older who are at increased risk for falls. (No new symptoms noted or reported today)  Depression: Screening -- General adult population, including pregnant and postpartum women  Grade: B(Recommended) recommends screening for depression in the general adult population,  Screening should be implemented with adequate systems in place to ensure accurate diagnosis, effective treatment, and appropriate follow-up. (  ) Glaucoma: Screening - Adults and Diabetic Eye Exam     (  ) Thyroid Dysfunction: Screening --      (Will consider at age 39) Prostate Cancer: Prostate-Specific Antigen (PSA)-Based Screening -- All Men  PSA     Educational materials printed for patient's review and were included in patient instructions on his After Visit Summary and given to patient at the end of visit. Counseled regarding above diagnosis, including possible risks and complications,  especially if left uncontrolled. Counseled regarding the possible side effects, risks, benefits and alternatives to treatment; patient and/or guardian verbalizes understanding, agrees, feels comfortable with and wishes to proceed with above treatment plan. Advised patient to call with any new medication issues, and read all Rx info from pharmacy to assure aware of all possible risks and side effects of medication before taking. Reviewed age and gender appropriate health screening exams and vaccinations. Advised patient regarding importance of keeping up with recommended health maintenance and to schedule as soon as possible if overdue, as this is important in assessing for undiagnosed pathology, especially cancer, as well as protecting against potentially harmful/life threatening disease. Patient verbalizes understanding and agrees with above counseling, assessment and plan. All questions answered.     On 02/10/22 I have spent 30 reviewing previous notes, test results and face to face with the patient discussing the diagnosis and importance of compliance with the treatment plan as well as documenting on the day of the visit. Educational materials exercises printed for patient's review and were included in patient instructions on their After Visit Summary and given to patient at the end of visit.      Return in about 3 months (around 5/10/2022) for 3 month Medication Follow up, Routine Visit with Labs. Mary Kay Pastor is doing really well on his medications. He has no complaints today. He is here for a 3-month medication visit  Patient is here today to review medication and have his 3-month checkup on the Adderall and other medications. Reports no problems at this time dosage seems to be working well. Feels to be more productive and focusing is exponentially better. There has been no reports of chest pain, palpitations, nausea, vomiting, or uncontrolled weight loss. Denies any shortness of breath. Patient continues to do optimal on the medication which speaks to a good diagnosis and medication matching. LAST VISIT  Feels like he is having static in his head feeling a SZ like presentation he has had in the past and was placed on Lamictal for it. After they put him on the Lamictal for it three years ago it got better but he was under the care of a neurologist.  Wants to go up on the medications prior to tyring to get in with a specialist.      Women's describing a static almost electrical feeling throughout his whole head episodic feels like when he was first diagnosed with epilepsy and placed on Lamictal which made his symptoms go away. He thinks it may be his Lamictal dose is too low and needs to get increased. He has not seen a neurologist for some time but his last MRI of the brain was in 2017 and was unremarkable. We do not have any of the neurology notes.   Patient is unemployed and does not have any form of transportation so I have been trying to help out with keeping him on his medications until we can get him in with a specialist.    Isrrael Douglas is a 59-year-old white male who has followed up on some tough times. He was once under the care of a psychotherapist but that person lost their license and since then I have been trying to manage Rommel Gubler medications but based on his current living conditions and run in with the authorities it is quite evident that I need a specialist involved in medication management. We went through his medications and there were a few medicines that he was on that were on my list that he says he is not taking I am concerned about this in the face that I feel he was maintained on an even keel when he was taking all of his medications but now that he is ran out or stopped or come off of on his own he seems to be on a slight decline. He answered some depression questions but states that he has no real plan. He denies any homicidal ideations. He stated he may have not understood the question appropriately. CHRONIC CONDITION:      HTN: Stable hypertension, controlled on  amLODIPine (NORVASC) 5 MG tablet, take 1 tablet by mouth once daily, Disp: 30 tablet, Rfl: 5 remains at a mild intensity but overall good control, without symptoms, no ringing in the ears, no headaches and no nose bleeds. Better on medications. Hyperlipidemia: Mild in intensity but controlled on   atorvastatin (LIPITOR) 10 MG tablet, Take 1 tablet by mouth daily, Disp: 30 tablet, Rfl: 5controlled, without symptoms, no complications with dietary treatment regimen reporting no side effects or intolereances. Compliant with treatment and diet. No muscle aches, new joint pains or abd pain. Depression/Anxiety/History of SZ/ADD: Stable depression with generalized anxiety. Mild in intensity but well controlled on    ALPRAZolam (XANAX) 0.5 MG tablet, Take 1 tablet by mouth nightly as needed for Sleep for up to 30 days. , Disp: 30 tablet, Rfl: 2   amphetamine-dextroamphetamine (ADDERALL XR) 30 MG extended release capsule, Take 1 capsule by mouth every morning for 30 days. , Disp: 30 capsule, Rfl: 0  gabapentin (NEURONTIN) 800 MG tablet, Take 1 tablet by mouth 3 times daily for 30 days. , Disp: 90 tablet, Rfl: 5  DULoxetine (CYMBALTA) 60 MG extended release capsule, Take 1 capsule by mouth 2 times daily, Disp: 60 capsule, Rfl: 5  ibuprofen (ADVIL;MOTRIN) 800 MG tablet, Take 1 tablet by mouth every 6 hours as needed for Pain, Disp: 120 tablet, Rfl: 1  lamoTRIgine (LAMICTAL) 200 MG tablet, Take 1 tablet by mouth daily, Disp: 30 tablet, Rfl: 5 without symptoms, no weight gain, no increase in anxiety, no suicidal or homicidal ideation's no unexplained fatigue, or relationship difficulties relayed this visit. Review of Systems   Constitutional: Negative for activity change, chills, diaphoresis, fatigue, fever and unexpected weight change. HENT: Negative for trouble swallowing and voice change. Eyes: Negative for visual disturbance. Respiratory: Negative for cough, chest tightness, shortness of breath and wheezing. Cardiovascular: Negative for chest pain, palpitations and leg swelling. Gastrointestinal: Negative for abdominal pain, blood in stool, constipation, diarrhea, nausea and vomiting. Endocrine: Negative for polydipsia, polyphagia and polyuria. Genitourinary: Negative for dysuria, enuresis, frequency and hematuria. Musculoskeletal: Negative for arthralgias, back pain, gait problem, joint swelling, myalgias and neck stiffness. Skin: Negative for rash. Neurological: Negative for dizziness, seizures, syncope, facial asymmetry, weakness, light-headedness, numbness and headaches. Hematological: Does not bruise/bleed easily. Psychiatric/Behavioral: Negative for behavioral problems, confusion, hallucinations and suicidal ideas. The patient is not nervous/anxious.           Current Outpatient Medications:     ibuprofen (ADVIL;MOTRIN) 800 MG tablet, Take 1 tablet by mouth every 6 hours as needed for Pain, Disp: 120 tablet, Rfl: 1    amphetamine-dextroamphetamine (ADDERALL XR) 30 MG extended release capsule, Take 1 capsule by mouth every morning for 30 days. , Disp: 30 capsule, Rfl: 0    gabapentin (NEURONTIN) 800 MG tablet, Take 1 tablet by mouth 3 times daily for 30 days. , Disp: 90 tablet, Rfl: 5    amLODIPine (NORVASC) 5 MG tablet, take 1 tablet by mouth once daily, Disp: 30 tablet, Rfl: 5    ondansetron (ZOFRAN) 4 MG tablet, Take 1 tablet by mouth 3 times daily as needed for Nausea or Vomiting, Disp: 30 tablet, Rfl: 0    DULoxetine (CYMBALTA) 60 MG extended release capsule, Take 1 capsule by mouth 2 times daily, Disp: 60 capsule, Rfl: 5    lamoTRIgine (LAMICTAL) 200 MG tablet, take 1 tablet by mouth once daily, Disp: 30 tablet, Rfl: 5    ALPRAZolam (XANAX) 0.5 MG tablet, take 1 tablet by mouth at bedtime for sleep, Disp: 30 tablet, Rfl: 2    atorvastatin (LIPITOR) 10 MG tablet, Take 1 tablet by mouth daily, Disp: 30 tablet, Rfl: 5  Allergies   Allergen Reactions    Latex Rash     condoms    Norco [Hydrocodone-Acetaminophen] Nausea Only    Pcn [Penicillins] Hives       Past Medical History:   Diagnosis Date    ADHD     Anxiety     Bipolar 1 disorder ([facility] [address]     Cephalgia 2/4/2019    [facility]     History of kidney stones     HTN (hypertension) 2/19/2015    Hypertension     Pain induced    Neuropathy     right ulnar    Palpitation 02/19/2015    follows Dr Ld Correa    Right carpal tunnel syndrome      Past Surgical History:   Procedure Laterality Date    BACK SURGERY      BREAST LUMPECTOMY      CARPAL TUNNEL RELEASE Right 2/15/2019    RIGHT ENDOSCOPIC CARPAL TUNNEL RELEASE performed by Maria Alejandra Ferreira MD at [address] [place] [address] Left     lithotripsy    LUMBAR LAMINECTOMY  4/22/2015    discectomy L5 - S1    [place] DISK SURG,ANTER,CERVICAL,SINGLE LVL N/A 7/20/2018    ANTERIOR CERVICAL DISCECTOMY AND FUSION  C5-C6  WITH PLATES, SCREWS, ONE C-ARM, REGULAR BED WITH HORSE SHOE - [employer] -- BOP performed by Ubaldo David MD at [address] Right 2/15/2019    RIGHT ULNAR NERVE IN SITU DECOMPRESSION WITH TRANSPOSITION performed by Silvestre Mendez MD at [facility] [place] History   Problem Relation Age of Onset    COPD Mother     Cancer Mother     Colon Cancer Father     Seizures Sister     Other Sister         brain tumor    Cancer Paternal Aunt     Heart Disease Paternal Uncle     Heart Disease Paternal Grandmother      Social History     Socioeconomic History    Marital status:      Spouse name: Not on file    Number of children: Not on file    Years of education: Not on file    Highest education level: Not on file   Occupational History    Not on file   Tobacco Use    Smoking status: Current Every Day Smoker     Packs/day: 0.50     Years: 15.00     Pack years: 7.50     Types: Cigarettes    Smokeless tobacco: Never Used    Tobacco comment: smoke a little less than a ppd   Vaping Use    Vaping Use: Never used   Substance and Sexual Activity    Alcohol use: No    Drug use: No     Comment: former marijuana user    Sexual activity: Not on file   Other Topics Concern    Not on file   Social History Narrative    Not on file     Social Determinants of Health     Financial Resource Strain: Low Risk     Difficulty of Paying Living Expenses: Not hard at all   Food Insecurity: No Food Insecurity    Worried About Running Out of Food in the Last Year: Never true    [address] in the Last Year: Never true   Transportation Needs:     Lack of Transportation (Medical): Not on file    Lack of Transportation (Non-Medical):  Not on file   Physical Activity:     Days of Exercise per Week: Not on file    Minutes of Exercise per Session: Not on file   Stress:     Feeling of Stress : Not on file   Social Connections:     Frequency of Communication with Friends and Family: Not on file    Frequency of Social Gatherings with Friends and Family: Not on file    Attends Yarsanism Services: Not on file    Active Member of Clubs or Organizations: Not on file    Attends Club or Organization Meetings: Not on file    Marital Status: Not on file   Intimate Partner Violence:     Fear of Current or Ex-Partner: Not on file    Emotionally Abused: Not on file    Physically Abused: Not on file    Sexually Abused: Not on file   Housing Stability:     Unable to Pay for Housing in the Last Year: Not on file    Number of [place] in the Last Year: Not on file    Unstable Housing in the Last Year: Not on file       Vitals:    02/10/22 1313   BP: 120/80   Pulse: 82   Resp: 18   Temp: 97.9 °F (36.6 °C)   TempSrc: Temporal   SpO2: 98%   Weight: 175 lb (79.4 kg)   Height: 5' 10\" (1.778 m)       Physical Exam  Vitals and nursing note reviewed. Constitutional:       Appearance: Normal appearance. HENT:      Head: Normocephalic. Right Ear: Tympanic membrane and ear canal normal. There is no impacted cerumen. Left Ear: Tympanic membrane and ear canal normal. There is no impacted cerumen. Nose: Nose normal.      Mouth/Throat:      Mouth: Mucous membranes are dry. Eyes:      Extraocular Movements: Extraocular movements intact. Pupils: Pupils are equal, round, and reactive to light. Neck:      Vascular: No carotid bruit. Cardiovascular:      Rate and Rhythm: Normal rate and regular rhythm. Pulses: Normal pulses. Heart sounds: Normal heart sounds. No murmur heard. No friction rub. No gallop. Pulmonary:      Effort: Pulmonary effort is normal. No respiratory distress. Breath sounds: Normal breath sounds. No stridor. No wheezing, rhonchi or rales. Chest:      Chest wall: No tenderness. Abdominal:      General: Bowel sounds are normal. There is no distension. Palpations: Abdomen is soft. Musculoskeletal:         General: No swelling, tenderness, deformity or signs of injury. Cervical back: No rigidity. No muscular tenderness. Right lower leg: No edema. Left lower leg: No edema. Lymphadenopathy:      Cervical: No cervical adenopathy. Skin:     General: Skin is warm and dry. Capillary Refill: Capillary refill takes 2 to 3 seconds. Findings: No bruising, lesion or rash. Neurological:      General: No focal deficit present. Mental Status: He is alert and oriented to person, place, and time. Motor: No weakness. Gait: Gait normal.   Psychiatric:         Attention and Perception: Attention normal.         Mood and Affect: Mood normal.         Behavior: Behavior normal.         Thought Content: Thought content does not include homicidal or suicidal ideation. Thought content does not include homicidal or suicidal plan.                 Seen By:  Kiya Anand, APRN - CNP

## 2022-02-24 DIAGNOSIS — F98.8 ATTENTION DEFICIT DISORDER (ADD) WITHOUT HYPERACTIVITY: ICD-10-CM

## 2022-02-24 RX ORDER — DEXTROAMPHETAMINE SACCHARATE, AMPHETAMINE ASPARTATE MONOHYDRATE, DEXTROAMPHETAMINE SULFATE AND AMPHETAMINE SULFATE 7.5; 7.5; 7.5; 7.5 MG/1; MG/1; MG/1; MG/1
30 CAPSULE, EXTENDED RELEASE ORAL EVERY MORNING
Qty: 30 CAPSULE | Refills: 0 | Status: SHIPPED
Start: 2022-02-24 | End: 2022-03-28 | Stop reason: SDUPTHER

## 2022-03-18 DIAGNOSIS — F41.9 ANXIETY: ICD-10-CM

## 2022-03-18 RX ORDER — ALPRAZOLAM 0.5 MG/1
TABLET ORAL
Qty: 30 TABLET | Refills: 2 | Status: SHIPPED
Start: 2022-03-18 | End: 2022-06-15 | Stop reason: SDUPTHER

## 2022-03-28 DIAGNOSIS — F98.8 ATTENTION DEFICIT DISORDER (ADD) WITHOUT HYPERACTIVITY: ICD-10-CM

## 2022-03-28 RX ORDER — DEXTROAMPHETAMINE SACCHARATE, AMPHETAMINE ASPARTATE MONOHYDRATE, DEXTROAMPHETAMINE SULFATE AND AMPHETAMINE SULFATE 7.5; 7.5; 7.5; 7.5 MG/1; MG/1; MG/1; MG/1
30 CAPSULE, EXTENDED RELEASE ORAL EVERY MORNING
Qty: 30 CAPSULE | Refills: 0 | Status: SHIPPED
Start: 2022-03-28 | End: 2022-04-22 | Stop reason: SDUPTHER

## 2022-03-28 NOTE — TELEPHONE ENCOUNTER
Patients last appointment 2/10/2022.   Patients next scheduled appointment   Future Appointments   Date Time Provider Donna Crooks   5/10/2022  1:00 PM ACE Maurer - RAMIREZ SULLIVAN [facility]

## 2022-03-28 NOTE — TELEPHONE ENCOUNTER
----- Message from Rowland sent at 3/26/2022  8:53 AM EDT -----  Subject: Refill Request    QUESTIONS  Name of Medication? amphetamine-dextroamphetamine (ADDERALL XR) 30 MG   extended release capsule  Patient-reported dosage and instructions? once daily  How many days do you have left? 0  Preferred Pharmacy? [id number] phone number (if available)? [phone]  ---------------------------------------------------------------------------  --------------,  Name of Medication? gabapentin (NEURONTIN) 800 MG tablet  Patient-reported dosage and instructions? three times daily  How many days do you have left? 0  Preferred Pharmacy? [id number] phone number (if available)? [phone]  ---------------------------------------------------------------------------  --------------  CALL BACK INFO  What is the best way for the office to contact you? Do not leave any   message, patient will call back for answer  Preferred Call Back Phone Number?  [phone]

## 2022-04-22 DIAGNOSIS — F98.8 ATTENTION DEFICIT DISORDER (ADD) WITHOUT HYPERACTIVITY: ICD-10-CM

## 2022-04-22 RX ORDER — GABAPENTIN 800 MG/1
800 TABLET ORAL 3 TIMES DAILY
Qty: 90 TABLET | Refills: 5 | Status: SHIPPED
Start: 2022-04-22 | End: 2022-05-19 | Stop reason: SDUPTHER

## 2022-04-22 RX ORDER — DEXTROAMPHETAMINE SACCHARATE, AMPHETAMINE ASPARTATE MONOHYDRATE, DEXTROAMPHETAMINE SULFATE AND AMPHETAMINE SULFATE 7.5; 7.5; 7.5; 7.5 MG/1; MG/1; MG/1; MG/1
30 CAPSULE, EXTENDED RELEASE ORAL EVERY MORNING
Qty: 30 CAPSULE | Refills: 0 | Status: SHIPPED
Start: 2022-04-22 | End: 2022-05-19 | Stop reason: SDUPTHER

## 2022-05-10 ENCOUNTER — OFFICE VISIT (OUTPATIENT)
Dept: FAMILY MEDICINE CLINIC | Age: 42
End: 2022-05-10
Payer: COMMERCIAL

## 2022-05-10 VITALS
BODY MASS INDEX: 24.77 KG/M2 | RESPIRATION RATE: 18 BRPM | WEIGHT: 173 LBS | HEART RATE: 88 BPM | HEIGHT: 70 IN | SYSTOLIC BLOOD PRESSURE: 136 MMHG | DIASTOLIC BLOOD PRESSURE: 80 MMHG | OXYGEN SATURATION: 97 % | TEMPERATURE: 98.1 F
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
## 2022-05-10 DIAGNOSIS — I10 ESSENTIAL HYPERTENSION: Chronic | ICD-10-CM

## 2022-05-10 DIAGNOSIS — E78.01 FAMILIAL HYPERCHOLESTEROLEMIA: ICD-10-CM

## 2022-05-10 DIAGNOSIS — I10 ESSENTIAL HYPERTENSION: ICD-10-CM

## 2022-05-10 DIAGNOSIS — R53.83 FATIGUE, UNSPECIFIED TYPE: ICD-10-CM

## 2022-05-10 DIAGNOSIS — M54.2 NECK PAIN: Primary | ICD-10-CM

## 2022-05-10 LAB
BASOPHILS ABSOLUTE: 0.05 E9/L (ref 0–0.2)
BASOPHILS RELATIVE PERCENT: 0.5 % (ref 0–2)
EOSINOPHILS ABSOLUTE: 0.86 E9/L (ref 0.05–0.5)
EOSINOPHILS RELATIVE PERCENT: 8.8 % (ref 0–6)
HCT VFR BLD CALC: 46.1 % (ref 37–54)
HEMOGLOBIN: 14.5 G/DL (ref 12.5–16.5)
IMMATURE GRANULOCYTES #: 0.03 E9/L
IMMATURE GRANULOCYTES %: 0.3 % (ref 0–5)
LYMPHOCYTES ABSOLUTE: 2.93 E9/L (ref 1.5–4)
LYMPHOCYTES RELATIVE PERCENT: 29.9 % (ref 20–42)
MCH RBC QN AUTO: 29.5 PG (ref 26–35)
MCHC RBC AUTO-ENTMCNC: 31.5 % (ref 32–34.5)
MCV RBC AUTO: 93.9 FL (ref 80–99.9)
MONOCYTES ABSOLUTE: 0.52 E9/L (ref 0.1–0.95)
MONOCYTES RELATIVE PERCENT: 5.3 % (ref 2–12)
NEUTROPHILS ABSOLUTE: 5.4 E9/L (ref 1.8–7.3)
NEUTROPHILS RELATIVE PERCENT: 55.2 % (ref 43–80)
PDW BLD-RTO: 13.8 FL (ref 11.5–15)
PLATELET # BLD: 383 E9/L (ref 130–450)
PMV BLD AUTO: 10 FL (ref 7–12)
RBC # BLD: 4.91 E12/L (ref 3.8–5.8)
WBC # BLD: 9.8 E9/L (ref 4.5–11.5)

## 2022-05-10 PROCEDURE — G8420 CALC BMI NORM PARAMETERS: HCPCS | Performed by: NURSE PRACTITIONER

## 2022-05-10 PROCEDURE — 99214 OFFICE O/P EST MOD 30 MIN: CPT | Performed by: NURSE PRACTITIONER

## 2022-05-10 PROCEDURE — 36415 COLL VENOUS BLD VENIPUNCTURE: CPT | Performed by: NURSE PRACTITIONER

## 2022-05-10 PROCEDURE — G8427 DOCREV CUR MEDS BY ELIG CLIN: HCPCS | Performed by: NURSE PRACTITIONER

## 2022-05-10 PROCEDURE — 4004F PT TOBACCO SCREEN RCVD TLK: CPT | Performed by: NURSE PRACTITIONER

## 2022-05-10 RX ORDER — CYCLOBENZAPRINE HCL 5 MG
5 TABLET ORAL 2 TIMES DAILY PRN
Qty: 30 TABLET | Refills: 0 | Status: SHIPPED | OUTPATIENT
Start: 2022-05-10 | End: 2022-05-20

## 2022-05-10 RX ORDER — OXYCODONE HYDROCHLORIDE AND ACETAMINOPHEN 5; 325 MG/1; MG/1
1 TABLET ORAL EVERY 8 HOURS PRN
Qty: 3 TABLET | Refills: 0 | Status: SHIPPED | OUTPATIENT
Start: 2022-05-10 | End: 2022-05-12

## 2022-05-10 RX ORDER — ONDANSETRON 4 MG/1
4 TABLET, FILM COATED ORAL 3 TIMES DAILY PRN
Qty: 30 TABLET | Refills: 0 | Status: SHIPPED
Start: 2022-05-10 | End: 2022-06-20 | Stop reason: SDUPTHER

## 2022-05-10 RX ORDER — TRAMADOL HYDROCHLORIDE 50 MG/1
50 TABLET ORAL EVERY 8 HOURS PRN
Qty: 15 TABLET | Refills: 0 | Status: SHIPPED | OUTPATIENT
Start: 2022-05-10 | End: 2022-05-15

## 2022-05-10 RX ORDER — PREDNISONE 20 MG/1
20 TABLET ORAL 2 TIMES DAILY
Qty: 10 TABLET | Refills: 0 | Status: SHIPPED | OUTPATIENT
Start: 2022-05-10 | End: 2022-05-15

## 2022-05-10 ASSESSMENT — ENCOUNTER SYMPTOMS
BACK PAIN: 0
CHEST TIGHTNESS: 0
VOMITING: 0
VOICE CHANGE: 0
WHEEZING: 0
TROUBLE SWALLOWING: 0
ABDOMINAL PAIN: 0
BLOOD IN STOOL: 0
COUGH: 0
NAUSEA: 0
SHORTNESS OF BREATH: 0
CONSTIPATION: 0
DIARRHEA: 0

## 2022-05-10 ASSESSMENT — PATIENT HEALTH QUESTIONNAIRE - PHQ9
6. FEELING BAD ABOUT YOURSELF - OR THAT YOU ARE A FAILURE OR HAVE LET YOURSELF OR YOUR FAMILY DOWN: 0
5. POOR APPETITE OR OVEREATING: 0
10. IF YOU CHECKED OFF ANY PROBLEMS, HOW DIFFICULT HAVE THESE PROBLEMS MADE IT FOR YOU TO DO YOUR WORK, TAKE CARE OF THINGS AT HOME, OR GET ALONG WITH OTHER PEOPLE: 0
1. LITTLE INTEREST OR PLEASURE IN DOING THINGS: 0
4. FEELING TIRED OR HAVING LITTLE ENERGY: 0
7. TROUBLE CONCENTRATING ON THINGS, SUCH AS READING THE NEWSPAPER OR WATCHING TELEVISION: 0
3. TROUBLE FALLING OR STAYING ASLEEP: 0
SUM OF ALL RESPONSES TO PHQ QUESTIONS 1-9: 0
9. THOUGHTS THAT YOU WOULD BE BETTER OFF DEAD, OR OF HURTING YOURSELF: 0
SUM OF ALL RESPONSES TO PHQ QUESTIONS 1-9: 0
2. FEELING DOWN, DEPRESSED OR HOPELESS: 0
8. MOVING OR SPEAKING SO SLOWLY THAT OTHER PEOPLE COULD HAVE NOTICED. OR THE OPPOSITE, BEING SO FIGETY OR RESTLESS THAT YOU HAVE BEEN MOVING AROUND A LOT MORE THAN USUAL: 0
SUM OF ALL RESPONSES TO PHQ QUESTIONS 1-9: 0
SUM OF ALL RESPONSES TO PHQ QUESTIONS 1-9: 0
SUM OF ALL RESPONSES TO PHQ9 QUESTIONS 1 & 2: 0

## 2022-05-10 NOTE — PATIENT INSTRUCTIONS
Patient Education        Neck: Exercises  Introduction  Here are some examples of exercises for you to try. The exercises may be suggested for a condition or for rehabilitation. Start each exercise slowly. Ease off the exercises if you start to have pain. You will be told when to start these exercises and which ones will work bestfor you. How to do the exercises  Neck stretch    1. This stretch works best if you keep your shoulder down as you lean away from it. To help you remember to do this, start by relaxing your shoulders and lightly holding on to your thighs or your chair. 2. Tilt your head toward your shoulder and hold for 15 to 30 seconds. Let the weight of your head stretch your muscles. 3. If you would like a little added stretch, use your hand to gently and steadily pull your head toward your shoulder. For example, keeping your right shoulder down, lean your head to the left. 4. Repeat 2 to 4 times toward each shoulder. Diagonal neck stretch    1. Turn your head slightly toward the direction you will be stretching, and tilt your head diagonally toward your chest and hold for 15 to 30 seconds. 2. If you would like a little added stretch, use your hand to gently and steadily pull your head forward on the diagonal.  3. Repeat 2 to 4 times toward each side. Dorsal glide stretch    The dorsal glide stretches the back of the neck. If you feel pain, do not glide so far back. Some people find this exercise easier to do while lying on theirbacks with an ice pack on the neck. 1. Sit or stand tall and look straight ahead. 2. Slowly tuck your chin as you glide your head backward over your body  3. Hold for a count of 6, and then relax for up to 10 seconds. 4. Repeat 8 to 12 times. Chest and shoulder stretch    1. Sit or stand tall and glide your head backward as in the dorsal glide stretch. 2. Raise both arms so that your hands are next to your ears.   3. Take a deep breath, and as you breathe out, lower your elbows down and behind your back. You will feel your shoulder blades slide down and together, and at the same time you will feel a stretch across your chest and the front of your shoulders. 4. Hold for about 6 seconds, and then relax for up to 10 seconds. 5. Repeat 8 to 12 times. Strengthening: Hands on head    1. Move your head backward, forward, and side to side against gentle pressure from your hands, holding each position for about 6 seconds. 2. Repeat 8 to 12 times. Follow-up care is a key part of your treatment and safety. Be sure to make and go to all appointments, and call your doctor if you are having problems. It's also a good idea to know your test results and keep alist of the medicines you take. Where can you learn more? Go to https://[employer].hi5. org and sign in to your Clinithink account. Enter P975 in the TapFwd box to learn more about \"Neck: Exercises. \"     If you do not have an account, please click on the \"Sign Up Now\" link. Current as of: July 1, 2021               Content Version: 13.2  © 0099-1379 [employer]. Care instructions adapted under license by [facility] [place] ([facility]). If you have questions about a medical condition or this instruction, always ask your healthcare professional. [address] any warranty or liability for your use of this information. Patient Education        Neck Pain: Care Instructions  Your Care Instructions     You can have neck pain anywhere from the bottom of your head to the top of your shoulders. It can spread to the upper back or arms. Injuries, painting a ceiling, sleeping with your neck twisted, staying in one position for too long,and many other activities can cause neck pain. Most neck pain gets better with home care. Your doctor may recommend medicine to relieve pain or relax your muscles.  He or she may suggest exercise and physical therapy to increase flexibility and relieve stress. You may need towear a special (cervical) collar to support your neck for a day or two. Follow-up care is a key part of your treatment and safety. Be sure to make and go to all appointments, and call your doctor if you are having problems. It's also a good idea to know your test results and keep alist of the medicines you take. How can you care for yourself at home?  Try using a heating pad on a low or medium setting for 15 to 20 minutes every 2 or 3 hours. Try a warm shower in place of one session with the heating pad.  You can also try an ice pack for 10 to 15 minutes every 2 to 3 hours. Put a thin cloth between the ice and your skin.  Take pain medicines exactly as directed. ? If the doctor gave you a prescription medicine for pain, take it as prescribed. ? If you are not taking a prescription pain medicine, ask your doctor if you can take an over-the-counter medicine.  If your doctor recommends a cervical collar, wear it exactly as directed. When should you call for help? Call your doctor now or seek immediate medical care if:     You have new or worsening numbness in your arms, buttocks or legs.      You have new or worsening weakness in your arms or legs. (This could make it hard to stand up.)      You lose control of your bladder or bowels. Watch closely for changes in your health, and be sure to contact your doctor if:     Your neck pain is getting worse.      You are not getting better after 1 week.      You do not get better as expected. Where can you learn more? Go to https://[employer]irtha.Genwords. org and sign in to your SendRR account. Enter [id number] in the Imagekind box to learn more about \"Neck Pain: Care Instructions. \"     If you do not have an account, please click on the \"Sign Up Now\" link. Current as of: July 1, 2021               Content Version: 13.2  © 4257-5060 [employer].    Care instructions adapted under license by [facility]. If you have questions about a medical condition or this instruction, always ask your healthcare professional. [place] [address] any warranty or liability for your use of this information.

## 2022-05-10 NOTE — PROGRESS NOTES
Vicki Sexton : 1980 Sex: male  Age: 42 y.o. Chief Complaint   Patient presents with    Neck Pain     around his neck where he previously had surgery a few years ago on.  Hypertension     3 month check up, needs labs today missed his labs in Feb.        Assessment and Plan:    Criss Cline was seen today for neck pain and hypertension. Diagnoses and all orders for this visit:    Neck pain  -     XR CERVICAL SPINE (2-3 VIEWS); Future  -     traMADol (ULTRAM) 50 MG tablet; Take 1 tablet by mouth every 8 hours as needed for Pain for up to 5 days. Intended supply: 7 days. Take lowest dose possible to manage pain  -     oxyCODONE-acetaminophen (PERCOCET) 5-325 MG per tablet; Take 1 tablet by mouth every 8 hours as needed for Pain for up to 2 days. Intended supply: 7 days. Take lowest dose possible to manage pain    Essential hypertension    Fatigue, unspecified type    Fall from standing, initial encounter  -     XR CERVICAL SPINE (2-3 VIEWS); Future  -     traMADol (ULTRAM) 50 MG tablet; Take 1 tablet by mouth every 8 hours as needed for Pain for up to 5 days. Intended supply: 7 days. Take lowest dose possible to manage pain    Other orders  -     ondansetron (ZOFRAN) 4 MG tablet; Take 1 tablet by mouth 3 times daily as needed for Nausea or Vomiting  -     predniSONE (DELTASONE) 20 MG tablet; Take 1 tablet by mouth 2 times daily for 5 days  -     cyclobenzaprine (FLEXERIL) 5 MG tablet; Take 1 tablet by mouth 2 times daily as needed for Muscle spasms        Recommend he see neurologist for possible eval and treatment  LAST MRI of the brain in  was negative for any ominous findings. The increase ins dose improved versus resolved his symptoms so will wait due to the Transportation issue on a neurologist for now.     Is still seeing the therapist who is doing good work with him    He asked me if he should be taking Xanax twice a day as opposed to once a day I was very quick to deny the request but will be open to it if a specialist recommends it. Still no reports of change needed from the specialist      USPTF:    (BGL 97) Abnormal Blood Glucose and Type 2 Diabetes Mellitus: Screening -- Adults aged 36 to 79 years who are overweight or obese Grade: B (Recommended)    (B/P 136/80) High Blood Pressure: Screening and Home Monitoring -- Adults  Grade: A (Recommended) recommends screening for high blood pressure in ages 25 years or older. obtain measurements outside of the clinical setting for diagnostic confirmation before starting treatment. Annual screening for adults aged 36 years or older or those who are at increased risk for blood pressure    (Will discuss at 39) Colorectal Cancer: Screening --Adults aged 48 to 76 years  Grade: A (Recommended) recommends screening for colorectal cancer starting at age 48 years and continuing until age 76 years. (, TRIG 167, ) Start him on Lipitor 10 mg   Lipid Disorders in Adults: Screening -- Men 28 and Older Grade: A (Recommended) recommends screening men aged 28 and older for lipid disorders. (Rare if any) Alcohol Misuse: Screening and Behavioral Counseling Interventions in Primary Care -- Adults  Grade: B (Recommended) recommends that clinicians screen adults aged 25 years or older for alcohol misuse and provide persons engaged in risky or hazardous drinking with brief behavioral counseling interventions to reduce alcohol misuse. (BMI 25.24)  Obesity: Screening for and Management of-- All Adults  Grade: B(Recommended) recommends screening all adults for obesity. Clinicians should offer or refer patients with a body mass index (BMI) of 30 kg/m2 or higher to intensive, multicomponent behavioral interventions.         (Not a fall risk)  Fall Prevention -- Exercise/Physical Therapy: Community-dwelling Adults 72 Years or Older, Increased Risk for Falls   Grade: B (Recommended) recommends exercise or physical therapy to prevent falls in community-dwelling adults aged 72 years or older who are at increased risk for falls. (No new symptoms noted or reported today)  Depression: Screening -- General adult population, including pregnant and postpartum women  Grade: B(Recommended) recommends screening for depression in the general adult population,  Screening should be implemented with adequate systems in place to ensure accurate diagnosis, effective treatment, and appropriate follow-up. (  ) Glaucoma: Screening - Adults and Diabetic Eye Exam     (  ) Thyroid Dysfunction: Screening --      (Will consider at age 39) Prostate Cancer: Prostate-Specific Antigen (PSA)-Based Screening -- All Men  PSA     Educational materials printed for patient's review and were included in patient instructions on his After Visit Summary and given to patient at the end of visit. Counseled regarding above diagnosis, including possible risks and complications,  especially if left uncontrolled. Counseled regarding the possible side effects, risks, benefits and alternatives to treatment; patient and/or guardian verbalizes understanding, agrees, feels comfortable with and wishes to proceed with above treatment plan. Advised patient to call with any new medication issues, and read all Rx info from pharmacy to assure aware of all possible risks and side effects of medication before taking. Reviewed age and gender appropriate health screening exams and vaccinations. Advised patient regarding importance of keeping up with recommended health maintenance and to schedule as soon as possible if overdue, as this is important in assessing for undiagnosed pathology, especially cancer, as well as protecting against potentially harmful/life threatening disease. Patient verbalizes understanding and agrees with above counseling, assessment and plan. All questions answered.     On 05/10/22 I have spent 30 reviewing previous notes, test results and face to face with the patient discussing the diagnosis and importance of compliance with the treatment plan as well as documenting on the day of the visit. Educational materials exercises printed for patient's review and were included in patient instructions on their After Visit Summary and given to patient at the end of visit.      Return in about 3 months (around 8/10/2022) for 3 month Medication Follow up. Today is here for a 3 month visit and also has a complaint of neck pain following a fall from the standing position on 5th of may and is having continued pain at the neck area where he had surgery a few years ago. Cant get comfortable I really want to help Emma Belcher out but unfortunately he is already in pain management and cannot get in till June I will write him 3 pills today he tells me that he is allergic to Ultram and hydrocodone but he can tolerate Percocet apparently Ultram makes him itch and hydrocodone makes him nauseous. I advised him about taking the prednisone and the muscle relaxer for these are the mainstay for improving his condition I need the x-ray done due to retained instrumentation and consider going to the ER if any worsening or if new symptoms arise     Patient was seen here today for a medication visit and brought up the neck issue when he got here    LAST VISIT  This is a very pleasant 56-year-old male Who presents today for evaluation and management of chronic medical problems. Current medication list reviewed. The patient is tolerating all medications well without adverse events or known side effects. The patient does understand the risk and benefits of the prescribed medications. The patient is not up-to-date on all age-appropriate wellness issues. Patient denies any reccent hospitalizations or ER visit.       No Acute Complaints reported:       After increasing the dose he is doing much better  No more electrical feelings reported since dose was increased  LAST VISIT  Feels like he is having static in his head feeling a SZ like presentation he has had in the past and was placed on Lamictal for it. After they put him on the Lamictal for it three years ago it got better but he was under the care of a neurologist.  Wants to go up on the medications prior to tyring to get in with a specialist.      Women's describing a static almost electrical feeling throughout his whole head episodic feels like when he was first diagnosed with epilepsy and placed on Lamictal which made his symptoms go away. He thinks it may be his Lamictal dose is too low and needs to get increased. He has not seen a neurologist for some time but his last MRI of the brain was in 2017 and was unremarkable. We do not have any of the neurology notes. Patient is unemployed and does not have any form of transportation so I have been trying to help out with keeping him on his medications until we can get him in with a specialist.    Alyssa Bhatti is a 54-year-old white male who has followed up on some tough times. He was once under the care of a psychotherapist but that person lost their license and since then I have been trying to manage Valesvin Thakur medications but based on his current living conditions and run in with the authorities it is quite evident that I need a specialist involved in medication management. We went through his medications and there were a few medicines that he was on that were on my list that he says he is not taking I am concerned about this in the face that I feel he was maintained on an even keel when he was taking all of his medications but now that he is ran out or stopped or come off of on his own he seems to be on a slight decline. He answered some depression questions but states that he has no real plan. He denies any homicidal ideations. He stated he may have not understood the question appropriately.     Neck Pain   Pertinent negatives include no chest pain, fever, headaches, numbness, trouble swallowing or weakness. Hypertension  Associated symptoms include neck pain. Pertinent negatives include no chest pain, headaches, palpitations or shortness of breath. CHRONIC CONDITION:      HTN: Stable hypertension, controlled on  amLODIPine (NORVASC) 5 MG tablet, take 1 tablet by mouth once daily, Disp: 30 tablet, Rfl: 5 remains at a mild intensity but overall good control, without symptoms, no ringing in the ears, no headaches and no nose bleeds. Better on medications. Hyperlipidemia: Mild in intensity but controlled on diet controlled, without symptoms, no complications with dietary treatment regimen reporting no side effects or intolereances. Compliant with treatment and diet. No muscle aches, new joint pains or abd pain. Depression/Anxiety/History of SZ/ADD: Stable depression with generalized anxiety. Mild in intensity but well controlled on    ALPRAZolam (XANAX) 0.5 MG tablet, Take 1 tablet by mouth nightly as needed for Sleep for up to 30 days. , Disp: 30 tablet, Rfl: 2   amphetamine-dextroamphetamine (ADDERALL XR) 30 MG extended release capsule, Take 1 capsule by mouth every morning for 30 days. , Disp: 30 capsule, Rfl: 0  gabapentin (NEURONTIN) 800 MG tablet, Take 1 tablet by mouth 3 times daily for 30 days. , Disp: 90 tablet, Rfl: 5  DULoxetine (CYMBALTA) 60 MG extended release capsule, Take 1 capsule by mouth 2 times daily, Disp: 60 capsule, Rfl: 5  ibuprofen (ADVIL;MOTRIN) 800 MG tablet, Take 1 tablet by mouth every 6 hours as needed for Pain, Disp: 120 tablet, Rfl: 1  lamoTRIgine (LAMICTAL) 200 MG tablet, Take 1 tablet by mouth daily, Disp: 30 tablet, Rfl: 5 without symptoms, no weight gain, no increase in anxiety, no suicidal or homicidal ideation's no unexplained fatigue, or relationship difficulties relayed this visit. Review of Systems   Constitutional: Negative for activity change, chills, diaphoresis, fatigue, fever and unexpected weight change.    HENT: Negative for trouble swallowing and voice change. Eyes: Negative for visual disturbance. Respiratory: Negative for cough, chest tightness, shortness of breath and wheezing. Cardiovascular: Negative for chest pain, palpitations and leg swelling. Gastrointestinal: Negative for abdominal pain, blood in stool, constipation, diarrhea, nausea and vomiting. Endocrine: Negative for polydipsia, polyphagia and polyuria. Genitourinary: Negative for dysuria, enuresis, frequency and hematuria. Musculoskeletal: Positive for neck pain. Negative for arthralgias, back pain, gait problem, joint swelling, myalgias and neck stiffness. Skin: Negative for rash. Neurological: Negative for dizziness, seizures, syncope, facial asymmetry, weakness, light-headedness, numbness and headaches. Hematological: Does not bruise/bleed easily. Psychiatric/Behavioral: Negative for behavioral problems, confusion, hallucinations and suicidal ideas. The patient is not nervous/anxious. Current Outpatient Medications:     ondansetron (ZOFRAN) 4 MG tablet, Take 1 tablet by mouth 3 times daily as needed for Nausea or Vomiting, Disp: 30 tablet, Rfl: 0    predniSONE (DELTASONE) 20 MG tablet, Take 1 tablet by mouth 2 times daily for 5 days, Disp: 10 tablet, Rfl: 0    cyclobenzaprine (FLEXERIL) 5 MG tablet, Take 1 tablet by mouth 2 times daily as needed for Muscle spasms, Disp: 30 tablet, Rfl: 0    traMADol (ULTRAM) 50 MG tablet, Take 1 tablet by mouth every 8 hours as needed for Pain for up to 5 days. Intended supply: 7 days. Take lowest dose possible to manage pain, Disp: 15 tablet, Rfl: 0    oxyCODONE-acetaminophen (PERCOCET) 5-325 MG per tablet, Take 1 tablet by mouth every 8 hours as needed for Pain for up to 2 days. Intended supply: 7 days.  Take lowest dose possible to manage pain, Disp: 3 tablet, Rfl: 0    amphetamine-dextroamphetamine (ADDERALL XR) 30 MG extended release capsule, Take 1 capsule by mouth every morning for 30 days., Disp: 30 capsule, Rfl: 0    gabapentin (NEURONTIN) 800 MG tablet, Take 1 tablet by mouth 3 times daily for 30 days. , Disp: 90 tablet, Rfl: 5    ALPRAZolam (XANAX) 0.5 MG tablet, take 1 tablet by mouth at bedtime for sleep, Disp: 30 tablet, Rfl: 2    ibuprofen (ADVIL;MOTRIN) 800 MG tablet, Take 1 tablet by mouth every 6 hours as needed for Pain, Disp: 120 tablet, Rfl: 1    DULoxetine (CYMBALTA) 60 MG extended release capsule, Take 1 capsule by mouth 2 times daily, Disp: 60 capsule, Rfl: 5    lamoTRIgine (LAMICTAL) 200 MG tablet, take 1 tablet by mouth once daily, Disp: 30 tablet, Rfl: 5    atorvastatin (LIPITOR) 10 MG tablet, Take 1 tablet by mouth daily, Disp: 30 tablet, Rfl: 5    amLODIPine (NORVASC) 5 MG tablet, take 1 tablet by mouth once daily, Disp: 30 tablet, Rfl: 5  Allergies   Allergen Reactions    Latex Rash     condoms    Norco [Hydrocodone-Acetaminophen] Nausea Only    Pcn [Penicillins] Hives       Past Medical History:   Diagnosis Date    ADHD     Anxiety     Bipolar 1 disorder ([facility] [address]     Cephalgia 2/4/2019    HEGCWJSO(290.4)     History of kidney stones     HTN (hypertension) 2/19/2015    Hypertension     Pain induced    Neuropathy     right ulnar    Palpitation 02/19/2015    follows Dr Timothy Malik    Right carpal tunnel syndrome      Past Surgical History:   Procedure Laterality Date    BACK SURGERY      BREAST LUMPECTOMY      CARPAL TUNNEL RELEASE Right 2/15/2019    RIGHT ENDOSCOPIC CARPAL TUNNEL RELEASE performed by Will Leon MD at [address] [place] [address] Left     lithotripsy    LUMBAR LAMINECTOMY  4/22/2015    discectomy L5 - S1    FL DISK SURG,ANTER,CERVICAL,SINGLE LVL N/A 7/20/2018    ANTERIOR CERVICAL DISCECTOMY AND FUSION  C5-C6  WITH PLATES, SCREWS, ONE C-ARM, REGULAR BED WITH HORSE SHOE - [employer] -- BOP performed by Marcella Wise MD at [facility] Right 2/15/2019    RIGHT ULNAR NERVE IN SITU DECOMPRESSION WITH TRANSPOSITION performed by Yessica Roberts MD at [facility] History   Problem Relation Age of Onset    COPD Mother     Cancer Mother     Colon Cancer Father     Seizures Sister     Other Sister         brain tumor    Cancer Paternal Aunt     Heart Disease Paternal Uncle     Heart Disease Paternal Grandmother      Social History     Socioeconomic History    Marital status:      Spouse name: Not on file    Number of children: Not on file    Years of education: Not on file    Highest education level: Not on file   Occupational History    Not on file   Tobacco Use    Smoking status: Current Every Day Smoker     Packs/day: 0.50     Years: 15.00     Pack years: 7.50     Types: Cigarettes    Smokeless tobacco: Never Used    Tobacco comment: smoke a little less than a ppd   Vaping Use    Vaping Use: Never used   Substance and Sexual Activity    Alcohol use: No    Drug use: No     Comment: former marijuana user    Sexual activity: Not on file   Other Topics Concern    Not on file   Social History Narrative    Not on file     Social Determinants of Health     Financial Resource Strain: Low Risk     Difficulty of Paying Living Expenses: Not hard at all   Food Insecurity: No Food Insecurity    Worried About [facility] in the Last Year: Never true    [facility] in the Last Year: Never true   Transportation Needs:     Lack of Transportation (Medical): Not on file    Lack of Transportation (Non-Medical):  Not on file   Physical Activity:     Days of Exercise per Week: Not on file    Minutes of Exercise per Session: Not on file   Stress:     Feeling of Stress : Not on file   Social Connections:     Frequency of Communication with Friends and Family: Not on file    Frequency of Social Gatherings with Friends and Family: Not on file    Attends Congregation Services: Not on file    Active Member of Clubs or Organizations: Not on file    Attends Club or Organization Meetings: Not on file    Marital Status: Not on file   Intimate Partner Violence:     Fear of Current or Ex-Partner: Not on file    Emotionally Abused: Not on file    Physically Abused: Not on file    Sexually Abused: Not on file   Housing Stability:     Unable to Pay for Housing in the Last Year: Not on file    Number of [place] in the Last Year: Not on file    Unstable Housing in the Last Year: Not on file       Vitals:    05/10/22 1251   BP: 136/80   Pulse: 88   Resp: 18   Temp: 98.1 °F (36.7 °C)   TempSrc: Temporal   SpO2: 97%   Weight: 173 lb (78.5 kg)   Height: 5' 10\" (1.778 m)       Physical Exam  Vitals and nursing note reviewed. Constitutional:       Appearance: Normal appearance. HENT:      Head: Normocephalic. Right Ear: Tympanic membrane and ear canal normal. There is no impacted cerumen. Left Ear: Tympanic membrane and ear canal normal. There is no impacted cerumen. Nose: Nose normal.      Mouth/Throat:      Mouth: Mucous membranes are dry. Eyes:      Extraocular Movements: Extraocular movements intact. Pupils: Pupils are equal, round, and reactive to light. Neck:      Vascular: No carotid bruit. Cardiovascular:      Rate and Rhythm: Normal rate and regular rhythm. Pulses: Normal pulses. Heart sounds: Normal heart sounds. No murmur heard. No friction rub. No gallop. Pulmonary:      Effort: Pulmonary effort is normal. No respiratory distress. Breath sounds: Normal breath sounds. No stridor. No wheezing, rhonchi or rales. Chest:      Chest wall: No tenderness. Abdominal:      General: Bowel sounds are normal. There is no distension. Palpations: Abdomen is soft. Musculoskeletal:         General: No swelling, tenderness, deformity or signs of injury. Cervical back: No rigidity. No muscular tenderness. Right lower leg: No edema. Left lower leg: No edema. Lymphadenopathy:      Cervical: No cervical adenopathy.    Skin:     General: Skin is warm and dry. Capillary Refill: Capillary refill takes 2 to 3 seconds. Findings: No bruising, lesion or rash. Neurological:      General: No focal deficit present. Mental Status: He is alert and oriented to person, place, and time. Motor: No weakness. Gait: Gait normal.   Psychiatric:         Attention and Perception: Attention normal.         Mood and Affect: Mood normal.         Behavior: Behavior normal.         Thought Content: Thought content does not include homicidal or suicidal ideation. Thought content does not include homicidal or suicidal plan.                 Seen By:  ACE Cortés - CNP

## 2022-05-11 LAB
ALBUMIN SERPL-MCNC: 4.5 G/DL (ref 3.5–5.2)
ALP BLD-CCNC: 74 U/L (ref 40–129)
ALT SERPL-CCNC: 14 U/L (ref 0–40)
ANION GAP SERPL CALCULATED.3IONS-SCNC: 15 MMOL/L (ref 7–16)
AST SERPL-CCNC: 26 U/L (ref 0–39)
BILIRUB SERPL-MCNC: 0.3 MG/DL (ref 0–1.2)
BUN BLDV-MCNC: 7 MG/DL (ref 6–20)
CALCIUM SERPL-MCNC: 9.6 MG/DL (ref 8.6–10.2)
CHLORIDE BLD-SCNC: 109 MMOL/L (ref 98–107)
CHOLESTEROL, TOTAL: 170 MG/DL (ref 0–199)
CO2: 22 MMOL/L (ref 22–29)
CREAT SERPL-MCNC: 0.8 MG/DL (ref 0.7–1.2)
GFR AFRICAN AMERICAN: >60
GFR NON-AFRICAN AMERICAN: >60 ML/MIN/1.73
GLUCOSE FASTING: 103 MG/DL (ref 74–99)
HDLC SERPL-MCNC: 44 MG/DL
LDL CHOLESTEROL CALCULATED: 95 MG/DL (ref 0–99)
POTASSIUM SERPL-SCNC: 4.4 MMOL/L (ref 3.5–5)
SODIUM BLD-SCNC: 146 MMOL/L (ref 132–146)
TOTAL PROTEIN: 6.9 G/DL (ref 6.4–8.3)
TRIGL SERPL-MCNC: 157 MG/DL (ref 0–149)
VLDLC SERPL CALC-MCNC: 31 MG/DL

## 2022-05-16 DIAGNOSIS — M54.2 NECK PAIN: ICD-10-CM

## 2022-05-16 RX ORDER — OXYCODONE HYDROCHLORIDE AND ACETAMINOPHEN 5; 325 MG/1; MG/1
1 TABLET ORAL EVERY 8 HOURS PRN
Qty: 3 TABLET | Refills: 0 | OUTPATIENT
Start: 2022-05-16 | End: 2022-05-18

## 2022-05-16 NOTE — TELEPHONE ENCOUNTER
Patients last appointment 5/10/2022. Patients next scheduled appointment   Future Appointments   Date Time Provider Donna Crooks   8/11/2022  1:00 PM ACE Parker CNP [facility]    [place]: patient stated that he does not see [facility] until 08- and you only gave him 3 percocet so he would like a refill.     I PENDED THE MEDICATION PLEASE COMPLETE HOW MANY YOU WANT TO GIVE BECAUSE AS OF NOW IT WILL SAY #3.

## 2022-05-19 DIAGNOSIS — F98.8 ATTENTION DEFICIT DISORDER (ADD) WITHOUT HYPERACTIVITY: ICD-10-CM

## 2022-05-19 RX ORDER — DEXTROAMPHETAMINE SACCHARATE, AMPHETAMINE ASPARTATE MONOHYDRATE, DEXTROAMPHETAMINE SULFATE AND AMPHETAMINE SULFATE 7.5; 7.5; 7.5; 7.5 MG/1; MG/1; MG/1; MG/1
30 CAPSULE, EXTENDED RELEASE ORAL EVERY MORNING
Qty: 30 CAPSULE | Refills: 0 | Status: SHIPPED
Start: 2022-05-19 | End: 2022-06-20 | Stop reason: SDUPTHER

## 2022-05-19 RX ORDER — GABAPENTIN 800 MG/1
800 TABLET ORAL 3 TIMES DAILY
Qty: 90 TABLET | Refills: 5 | Status: SHIPPED
Start: 2022-05-19 | End: 2022-06-20 | Stop reason: SDUPTHER

## 2022-06-15 DIAGNOSIS — F41.9 ANXIETY: ICD-10-CM

## 2022-06-15 RX ORDER — ALPRAZOLAM 0.5 MG/1
TABLET ORAL
Qty: 30 TABLET | Refills: 2 | Status: SHIPPED
Start: 2022-06-15 | End: 2022-09-08 | Stop reason: SDUPTHER

## 2022-06-20 DIAGNOSIS — F98.8 ATTENTION DEFICIT DISORDER (ADD) WITHOUT HYPERACTIVITY: ICD-10-CM

## 2022-06-20 RX ORDER — GABAPENTIN 800 MG/1
800 TABLET ORAL 3 TIMES DAILY
Qty: 90 TABLET | Refills: 5 | Status: SHIPPED
Start: 2022-06-20 | End: 2022-08-15 | Stop reason: SDUPTHER

## 2022-06-20 RX ORDER — DEXTROAMPHETAMINE SACCHARATE, AMPHETAMINE ASPARTATE MONOHYDRATE, DEXTROAMPHETAMINE SULFATE AND AMPHETAMINE SULFATE 7.5; 7.5; 7.5; 7.5 MG/1; MG/1; MG/1; MG/1
30 CAPSULE, EXTENDED RELEASE ORAL EVERY MORNING
Qty: 30 CAPSULE | Refills: 0 | Status: SHIPPED
Start: 2022-06-20 | End: 2022-07-18 | Stop reason: SDUPTHER

## 2022-06-20 RX ORDER — ONDANSETRON 4 MG/1
4 TABLET, FILM COATED ORAL 3 TIMES DAILY PRN
Qty: 30 TABLET | Refills: 0 | Status: SHIPPED
Start: 2022-06-20 | End: 2022-07-27 | Stop reason: SDUPTHER

## 2022-07-11 DIAGNOSIS — I10 ESSENTIAL HYPERTENSION: ICD-10-CM

## 2022-07-11 RX ORDER — AMLODIPINE BESYLATE 5 MG/1
5 TABLET ORAL DAILY
Qty: 90 TABLET | Refills: 1 | Status: SHIPPED | OUTPATIENT
Start: 2022-07-11 | End: 2022-10-09

## 2022-07-11 RX ORDER — ATORVASTATIN CALCIUM 10 MG/1
10 TABLET, FILM COATED ORAL DAILY
Qty: 90 TABLET | Refills: 1 | Status: SHIPPED | OUTPATIENT
Start: 2022-07-11 | End: 2022-10-09

## 2022-07-11 RX ORDER — DULOXETIN HYDROCHLORIDE 60 MG/1
60 CAPSULE, DELAYED RELEASE ORAL 2 TIMES DAILY
Qty: 180 CAPSULE | Refills: 1 | Status: SHIPPED | OUTPATIENT
Start: 2022-07-11 | End: 2022-10-09

## 2022-07-11 RX ORDER — LAMOTRIGINE 200 MG/1
TABLET ORAL
Qty: 90 TABLET | Refills: 1 | Status: SHIPPED | OUTPATIENT
Start: 2022-07-11

## 2022-07-18 DIAGNOSIS — F98.8 ATTENTION DEFICIT DISORDER (ADD) WITHOUT HYPERACTIVITY: ICD-10-CM

## 2022-07-18 RX ORDER — DEXTROAMPHETAMINE SACCHARATE, AMPHETAMINE ASPARTATE MONOHYDRATE, DEXTROAMPHETAMINE SULFATE AND AMPHETAMINE SULFATE 7.5; 7.5; 7.5; 7.5 MG/1; MG/1; MG/1; MG/1
30 CAPSULE, EXTENDED RELEASE ORAL EVERY MORNING
Qty: 30 CAPSULE | Refills: 0 | Status: SHIPPED
Start: 2022-07-18 | End: 2022-08-15 | Stop reason: SDUPTHER

## 2022-07-18 NOTE — TELEPHONE ENCOUNTER
Patients last appointment 5/10/2022.   Patients next scheduled appointment   Future Appointments   Date Time Provider Donna Crooks   8/11/2022  1:00 PM ACE Zamudio - CNP [facility]

## 2022-07-27 RX ORDER — ONDANSETRON 4 MG/1
4 TABLET, FILM COATED ORAL 3 TIMES DAILY PRN
Qty: 30 TABLET | Refills: 0 | Status: SHIPPED
Start: 2022-07-27 | End: 2022-10-10 | Stop reason: SDUPTHER

## 2022-08-11 ENCOUNTER — OFFICE VISIT (OUTPATIENT)
Dept: FAMILY MEDICINE CLINIC | Age: 42
End: 2022-08-11
Payer: COMMERCIAL

## 2022-08-11 VITALS
WEIGHT: 185 LBS | HEIGHT: 70 IN | RESPIRATION RATE: 18 BRPM | SYSTOLIC BLOOD PRESSURE: 128 MMHG | HEART RATE: 69 BPM | DIASTOLIC BLOOD PRESSURE: 80 MMHG | OXYGEN SATURATION: 98 % | BODY MASS INDEX: 26.48 KG/M2 | TEMPERATURE: 97.9 F

## 2022-08-11 DIAGNOSIS — G40.319 INTRACTABLE GENERALIZED IDIOPATHIC EPILEPSY WITHOUT STATUS EPILEPTICUS (HCC): ICD-10-CM

## 2022-08-11 DIAGNOSIS — I10 ESSENTIAL HYPERTENSION: ICD-10-CM

## 2022-08-11 DIAGNOSIS — F41.9 ANXIETY: ICD-10-CM

## 2022-08-11 DIAGNOSIS — F98.8 ATTENTION DEFICIT DISORDER (ADD) WITHOUT HYPERACTIVITY: Primary | ICD-10-CM

## 2022-08-11 DIAGNOSIS — Z72.0 TOBACCO USE: ICD-10-CM

## 2022-08-11 DIAGNOSIS — E78.01 FAMILIAL HYPERCHOLESTEROLEMIA: ICD-10-CM

## 2022-08-11 DIAGNOSIS — Z98.890 HISTORY OF LUMBAR LAMINECTOMY: ICD-10-CM

## 2022-08-11 DIAGNOSIS — F31.9 BIPOLAR AFFECTIVE DISORDER, REMISSION STATUS UNSPECIFIED (HCC): ICD-10-CM

## 2022-08-11 PROCEDURE — 99213 OFFICE O/P EST LOW 20 MIN: CPT | Performed by: NURSE PRACTITIONER

## 2022-08-11 PROCEDURE — G8419 CALC BMI OUT NRM PARAM NOF/U: HCPCS | Performed by: NURSE PRACTITIONER

## 2022-08-11 PROCEDURE — G8427 DOCREV CUR MEDS BY ELIG CLIN: HCPCS | Performed by: NURSE PRACTITIONER

## 2022-08-11 PROCEDURE — 4004F PT TOBACCO SCREEN RCVD TLK: CPT | Performed by: NURSE PRACTITIONER

## 2022-08-11 SDOH — ECONOMIC STABILITY: FOOD INSECURITY: WITHIN THE PAST 12 MONTHS, THE FOOD YOU BOUGHT JUST DIDN'T LAST AND YOU DIDN'T HAVE MONEY TO GET MORE.: NEVER TRUE

## 2022-08-11 SDOH — ECONOMIC STABILITY: FOOD INSECURITY: WITHIN THE PAST 12 MONTHS, YOU WORRIED THAT YOUR FOOD WOULD RUN OUT BEFORE YOU GOT MONEY TO BUY MORE.: NEVER TRUE

## 2022-08-11 ASSESSMENT — ENCOUNTER SYMPTOMS
NAUSEA: 0
BACK PAIN: 1
RECTAL PAIN: 0
ABDOMINAL PAIN: 0
VOMITING: 0
DIARRHEA: 0
BLOOD IN STOOL: 0

## 2022-08-11 ASSESSMENT — PATIENT HEALTH QUESTIONNAIRE - PHQ9
6. FEELING BAD ABOUT YOURSELF - OR THAT YOU ARE A FAILURE OR HAVE LET YOURSELF OR YOUR FAMILY DOWN: 0
5. POOR APPETITE OR OVEREATING: 0
9. THOUGHTS THAT YOU WOULD BE BETTER OFF DEAD, OR OF HURTING YOURSELF: 0
SUM OF ALL RESPONSES TO PHQ QUESTIONS 1-9: 0
3. TROUBLE FALLING OR STAYING ASLEEP: 0
8. MOVING OR SPEAKING SO SLOWLY THAT OTHER PEOPLE COULD HAVE NOTICED. OR THE OPPOSITE, BEING SO FIGETY OR RESTLESS THAT YOU HAVE BEEN MOVING AROUND A LOT MORE THAN USUAL: 0
SUM OF ALL RESPONSES TO PHQ QUESTIONS 1-9: 0
SUM OF ALL RESPONSES TO PHQ9 QUESTIONS 1 & 2: 0
4. FEELING TIRED OR HAVING LITTLE ENERGY: 0
SUM OF ALL RESPONSES TO PHQ QUESTIONS 1-9: 0
10. IF YOU CHECKED OFF ANY PROBLEMS, HOW DIFFICULT HAVE THESE PROBLEMS MADE IT FOR YOU TO DO YOUR WORK, TAKE CARE OF THINGS AT HOME, OR GET ALONG WITH OTHER PEOPLE: 0
SUM OF ALL RESPONSES TO PHQ QUESTIONS 1-9: 0
7. TROUBLE CONCENTRATING ON THINGS, SUCH AS READING THE NEWSPAPER OR WATCHING TELEVISION: 0
2. FEELING DOWN, DEPRESSED OR HOPELESS: 0
1. LITTLE INTEREST OR PLEASURE IN DOING THINGS: 0

## 2022-08-11 ASSESSMENT — SOCIAL DETERMINANTS OF HEALTH (SDOH): HOW HARD IS IT FOR YOU TO PAY FOR THE VERY BASICS LIKE FOOD, HOUSING, MEDICAL CARE, AND HEATING?: NOT HARD AT ALL

## 2022-08-11 NOTE — PROGRESS NOTES
11/02/2021    LDLCALC 105 (H) 12/28/2020     Lab Results   Component Value Date    LABVLDL 31 05/10/2022    LABVLDL 33 11/02/2021    LABVLDL 27 12/28/2020     No results found for: CHOLHDLRATIO   (  )  Lipid Disorders in Adults: Screening -- Men 28 and Older  Grade: A (Recommended) recommends screening men aged 28 and older for lipid disorders. Alcohol Use: Not on file      (  ) Alcohol Misuse: Screening and Behavioral Counseling Interventions in Primary Care -- Adults  Grade: B (Recommended) recommends that clinicians screen adults aged 25 years or older for alcohol misuse and provide persons engaged in risky or hazardous drinking with brief behavioral counseling interventions to reduce alcohol misuse. (  ) Abdominal Aortic Aneurysm: Screening -- Men Ages 72 to 76 Years Who Have Ever Smoked. Grade: B (Recommended) recommends one-time screening for abdominal aortic aneurysm (AAA) with ultrasonography in men ages 72 to 76 years who have ever smoked. (  )  Lung Cancer: Screening -- Adults Ages 46-80 who have a 30 pack-year smoking history and currently smoke or have quit within the past 15 years  Grade: B (Recommended) recommends annual screening for lung cancer with low-dose computed tomography (LDCT) in adults aged 54 to [de-identified] years who have a 30 pack-year smoking history and currently smoke or have quit within the past 15 years. Screening should be discontinued once a person has not smoked for 15 years or develops a health problem that substantially limits life expectancy or the ability or willingness to have curative lung surgery. Estimated body mass index is 26.54 kg/m² as calculated from the following:    Height as of this encounter: 5' 10\" (1.778 m). Weight as of this encounter: 185 lb (83.9 kg). (  )  Obesity: Screening for and Management of-- All Adults  Grade: B(Recommended) recommends screening all adults for obesity.  Clinicians should offer or refer patients with a body mass index (BMI) of 30 kg/m2 or higher to intensive, multicomponent behavioral interventions. (Not a fall risk)  Fall Prevention -- Exercise/Physical Therapy: Community-dwelling Adults 72 Years or Older, Increased Risk for Falls   Grade: B (Recommended) recommends exercise or physical therapy to prevent falls in community-dwelling adults aged 72 years or older who are at increased risk for falls. (No new symptoms noted or reported today)  Depression: Screening -- General adult population, including pregnant and postpartum women  Grade: B(Recommended) recommends screening for depression in the general adult population,  Screening should be implemented with adequate systems in place to ensure accurate diagnosis, effective treatment, and appropriate follow-up. (  ) Glaucoma: Screening - Adults and Diabetic Eye Exam      (  ) Thyroid Dysfunction: Screening --      No results found for: PSA, PSADIA   (  ) Prostate Cancer: Prostate-Specific Antigen (PSA)-Based Screening -- All Men  PSA     (  ) Vitamin D Deficiency: Screening --      (  ) Skin Cancer: Screening --Asymptomatic adults Grade: I(Uncertain)     (  ) Carotid Artery Stenosis: Screening -- Adults Grade: D (Not Recommended)     (  ) Coronary Heart Disease: Screening with Electrocardiography--Adults at Low Risk Grade: D (Not Recommended)       Educational materials printed for patient's review and were included in patient instructions on his After Visit Summary and given to patient at the end of visit. Counseled regarding above diagnosis, including possible risks and complications,  especially if left uncontrolled. Counseled regarding the possible side effects, risks, benefits and alternatives to treatment; patient and/or guardian verbalizes understanding, agrees, feels comfortable with and wishes to proceed with above treatment plan.      Advised patient to call with any new medication issues, and read all Rx info from pharmacy to assure aware of all possible risks and side effects of medication before taking. Reviewed age and gender appropriate health screening exams and vaccinations. Advised patient regarding importance of keeping up with recommended health maintenance and to schedule as soon as possible if overdue, as this is important in assessing for undiagnosed pathology, especially cancer, as well as protecting against potentially harmful/life threatening disease. Patient verbalizes understanding and agrees with above counseling, assessment and plan. All questions answered. Return in about 3 months (around 11/11/2022) for 3 month Medication Follow up. Patient is here today to review medication and have his 3-month checkup on the Adderall and Xanax. Reports no problems at this time dosage seems to be working well. Feels to be more productive and focusing is exponentially better. There has been no reports of chest pain, palpitations, nausea, vomiting, or uncontrolled weight loss. Denies any shortness of breath. Patient continues to do optimal on the medication which speaks to a good diagnosis and medication matching. CHRONIC CONDITION:    HTN: Stable hypertension, controlled on   amLODIPine (NORVASC) 5 MG tablet, Take 1 tablet by mouth daily take 1 tablet by mouth once daily, Disp: 90 tablet, Rfl: 1 remains at a mild intensity but overall good control, without symptoms, no ringing in the ears, no headaches and no nose bleeds. Better on medications. CAD and CHF: Patient currently on a Betablocker and an ACE ARB for LVSD. Tobacco Use: N    Flu Shot Up To Date: Y     Meds Listed in Medical Record: Y     Hyperlipidemia: Mild in intensity but controlled on   atorvastatin (LIPITOR) 10 MG tablet, Take 1 tablet by mouth daily, Disp: 90 tablet, Rfl: 11, without symptoms, no complications with dietary treatment regimen reporting no side effects or intolereances. Compliant with treatment and diet.  No muscle aches, new joint pains or abd pain. Depression/Anxiety: Stable depression with generalized anxiety. Mild in intensity but well controlled on   amphetamine-dextroamphetamine (ADDERALL XR) 30 MG extended release capsule, Take 1 capsule by mouth every morning for 30 days. , Disp: 30 capsule, Rfl: 0  DULoxetine (CYMBALTA) 60 MG extended release capsule, Take 1 capsule by mouth 2 times daily, Disp: 180 capsule, Rfl: 1  lamoTRIgine (LAMICTAL) 200 MG tablet, take 1 tablet by mouth once daily, Disp: 90 tablet, Rfl: 1  gabapentin (NEURONTIN) 800 MG tablet, Take 1 tablet by mouth 3 times daily for 30 days. , Disp: 90 tablet, Rfl: 5  ALPRAZolam (XANAX) 0.5 MG tablet, take 1 tablet by mouth at bedtime for sleep, Disp: 30 tablet, Rfl: 2  ibuprofen (ADVIL;MOTRIN) 800 MG tablet, Take 1 tablet by mouth every 6 hours as needed for Pain, Disp: 120 tablet, Rfl: 1, without symptoms, no weight gain, no increase in anxiety, no suicidal or homicidal ideation's no unexplained fatigue, or relationship difficulties relayed this visit. Review of Systems   Constitutional:  Negative for activity change. Cardiovascular:  Negative for chest pain, palpitations and leg swelling. Gastrointestinal:  Negative for abdominal pain, blood in stool, diarrhea, nausea, rectal pain and vomiting. Genitourinary:  Negative for difficulty urinating, dysuria and flank pain. Musculoskeletal:  Positive for back pain and neck pain. Negative for gait problem and myalgias. Current Outpatient Medications:     ondansetron (ZOFRAN) 4 MG tablet, Take 1 tablet by mouth 3 times daily as needed for Nausea or Vomiting, Disp: 30 tablet, Rfl: 0    amphetamine-dextroamphetamine (ADDERALL XR) 30 MG extended release capsule, Take 1 capsule by mouth every morning for 30 days. , Disp: 30 capsule, Rfl: 0    amLODIPine (NORVASC) 5 MG tablet, Take 1 tablet by mouth daily take 1 tablet by mouth once daily, Disp: 90 tablet, Rfl: 1    atorvastatin (LIPITOR) 10 MG tablet, Take 1 tablet by mouth daily, Disp: 90 tablet, Rfl: 1    DULoxetine (CYMBALTA) 60 MG extended release capsule, Take 1 capsule by mouth 2 times daily, Disp: 180 capsule, Rfl: 1    lamoTRIgine (LAMICTAL) 200 MG tablet, take 1 tablet by mouth once daily, Disp: 90 tablet, Rfl: 1    gabapentin (NEURONTIN) 800 MG tablet, Take 1 tablet by mouth 3 times daily for 30 days. , Disp: 90 tablet, Rfl: 5    ALPRAZolam (XANAX) 0.5 MG tablet, take 1 tablet by mouth at bedtime for sleep, Disp: 30 tablet, Rfl: 2    ibuprofen (ADVIL;MOTRIN) 800 MG tablet, Take 1 tablet by mouth every 6 hours as needed for Pain, Disp: 120 tablet, Rfl: 1  Allergies   Allergen Reactions    Latex Rash     condoms    Norco [Hydrocodone-Acetaminophen] Nausea Only    Pcn [Penicillins] Hives       Past Medical History:   Diagnosis Date    ADHD     Anxiety     Bipolar 1 disorder ([facility] [address]     Cephalgia 2/4/2019    Headache(784.0)     History of kidney stones     HTN (hypertension) 2/19/2015    Hypertension     Pain induced    Neuropathy     right ulnar    Palpitation 02/19/2015    follows Dr Shahzad Suarez    Right carpal tunnel syndrome      Past Surgical History:   Procedure Laterality Date    BACK SURGERY      BREAST LUMPECTOMY      CARPAL TUNNEL RELEASE Right 2/15/2019    RIGHT ENDOSCOPIC CARPAL TUNNEL RELEASE performed by Janel Parada MD at [address] Left     lithotripsy    LUMBAR LAMINECTOMY  4/22/2015    discectomy L5 - S1    OK DISK SURG,ANTER,CERVICAL,SINGLE LVL N/A 7/20/2018    ANTERIOR CERVICAL DISCECTOMY AND FUSION  C5-C6  WITH PLATES, SCREWS, ONE C-ARM, REGULAR BED WITH HORSE SHOE - [employer] -- BOP performed by Randall Leon MD at [address] Right 2/15/2019    RIGHT ULNAR NERVE IN SITU DECOMPRESSION WITH TRANSPOSITION performed by Janel Parada MD at [facility] OR     Family History   Problem Relation Age of Onset    COPD Mother     Cancer Mother     Colon Cancer Father     Seizures Sister     Other Sister         brain tumor    Cancer Paternal Aunt     Heart Disease Paternal Uncle     Heart Disease Paternal Grandmother      Social History     Socioeconomic History    Marital status:      Spouse name: Not on file    Number of children: Not on file    Years of education: Not on file    Highest education level: Not on file   Occupational History    Not on file   Tobacco Use    Smoking status: Every Day     Packs/day: 0.50     Years: 15.00     Pack years: 7.50     Types: Cigarettes    Smokeless tobacco: Never    Tobacco comments:     smoke a little less than a ppd   Vaping Use    Vaping Use: Never used   Substance and Sexual Activity    Alcohol use: No    Drug use: No     Comment: former marijuana user    Sexual activity: Not on file   Other Topics Concern    Not on file   Social History Narrative    Not on file     Social Determinants of Health     Financial Resource Strain: Low Risk     Difficulty of Paying Living Expenses: Not hard at all   Food Insecurity: No Food Insecurity    Worried About Running Out of Food in the Last Year: Never true    Ran Out of Food in the Last Year: Never true   Transportation Needs: Not on file   Physical Activity: Not on file   Stress: Not on file   Social Connections: Not on file   Intimate Partner Violence: Not on file   Housing Stability: Not on file       Vitals:    08/11/22 1346   BP: 128/80   Site: Right Upper Arm   Position: Sitting   Cuff Size: Medium Adult   Pulse: 69   Resp: 18   Temp: 97.9 °F (36.6 °C)   TempSrc: Temporal   SpO2: 98%   Weight: 185 lb (83.9 kg)   Height: 5' 10\" (1.778 m)       Physical Exam  Constitutional:       Appearance: Normal appearance. HENT:      Head: Normocephalic. Nose: Nose normal.   Eyes:      Pupils: Pupils are equal, round, and reactive to light. Cardiovascular:      Rate and Rhythm: Normal rate and regular rhythm. Pulmonary:      Effort: Pulmonary effort is normal.   Abdominal:      General: Abdomen is flat. Palpations: Abdomen is soft. Musculoskeletal:         General: Normal range of motion. Skin:     General: Skin is warm and dry. Neurological:      General: No focal deficit present. Mental Status: He is alert. Mental status is at baseline.    Psychiatric:         Mood and Affect: Mood normal.              Seen By:  ACE Fisher - CNP

## 2022-08-15 DIAGNOSIS — F98.8 ATTENTION DEFICIT DISORDER (ADD) WITHOUT HYPERACTIVITY: ICD-10-CM

## 2022-08-15 RX ORDER — GABAPENTIN 800 MG/1
800 TABLET ORAL 3 TIMES DAILY
Qty: 90 TABLET | Refills: 5 | Status: SHIPPED
Start: 2022-08-15 | End: 2022-09-12 | Stop reason: SDUPTHER

## 2022-08-15 RX ORDER — DEXTROAMPHETAMINE SACCHARATE, AMPHETAMINE ASPARTATE MONOHYDRATE, DEXTROAMPHETAMINE SULFATE AND AMPHETAMINE SULFATE 7.5; 7.5; 7.5; 7.5 MG/1; MG/1; MG/1; MG/1
30 CAPSULE, EXTENDED RELEASE ORAL EVERY MORNING
Qty: 30 CAPSULE | Refills: 0 | Status: SHIPPED
Start: 2022-08-15 | End: 2022-09-12 | Stop reason: SDUPTHER

## 2022-08-15 NOTE — TELEPHONE ENCOUNTER
Patients last appointment 8/11/2022.   Patients next scheduled appointment   Future Appointments   Date Time Provider Donna Crooks   11/16/2022  1:00 PM ACE Garner CNP [facility]

## 2022-09-08 DIAGNOSIS — F41.9 ANXIETY: ICD-10-CM

## 2022-09-08 RX ORDER — ALPRAZOLAM 0.5 MG/1
TABLET ORAL
Qty: 30 TABLET | Refills: 2 | Status: SHIPPED | OUTPATIENT
Start: 2022-09-08 | End: 2022-12-06

## 2022-09-12 DIAGNOSIS — F98.8 ATTENTION DEFICIT DISORDER (ADD) WITHOUT HYPERACTIVITY: ICD-10-CM

## 2022-09-12 RX ORDER — DEXTROAMPHETAMINE SACCHARATE, AMPHETAMINE ASPARTATE MONOHYDRATE, DEXTROAMPHETAMINE SULFATE AND AMPHETAMINE SULFATE 7.5; 7.5; 7.5; 7.5 MG/1; MG/1; MG/1; MG/1
30 CAPSULE, EXTENDED RELEASE ORAL EVERY MORNING
Qty: 30 CAPSULE | Refills: 0 | Status: SHIPPED
Start: 2022-09-12 | End: 2022-10-10 | Stop reason: SDUPTHER

## 2022-09-12 RX ORDER — GABAPENTIN 800 MG/1
800 TABLET ORAL 3 TIMES DAILY
Qty: 90 TABLET | Refills: 5 | Status: SHIPPED
Start: 2022-09-12 | End: 2022-10-10 | Stop reason: SDUPTHER

## 2022-09-12 NOTE — TELEPHONE ENCOUNTER
Patients last appointment 8/11/2022.   Patients next scheduled appointment   Future Appointments   Date Time Provider Donna Crooks   11/16/2022  1:00 PM ACE Beavers CNP [facility]

## 2022-10-10 DIAGNOSIS — F98.8 ATTENTION DEFICIT DISORDER (ADD) WITHOUT HYPERACTIVITY: ICD-10-CM

## 2022-10-10 RX ORDER — ONDANSETRON 4 MG/1
4 TABLET, FILM COATED ORAL 3 TIMES DAILY PRN
Qty: 30 TABLET | Refills: 0 | Status: SHIPPED | OUTPATIENT
Start: 2022-10-10

## 2022-10-10 RX ORDER — DEXTROAMPHETAMINE SACCHARATE, AMPHETAMINE ASPARTATE MONOHYDRATE, DEXTROAMPHETAMINE SULFATE AND AMPHETAMINE SULFATE 7.5; 7.5; 7.5; 7.5 MG/1; MG/1; MG/1; MG/1
30 CAPSULE, EXTENDED RELEASE ORAL EVERY MORNING
Qty: 30 CAPSULE | Refills: 0 | Status: SHIPPED | OUTPATIENT
Start: 2022-10-10 | End: 2022-11-09

## 2022-10-10 RX ORDER — GABAPENTIN 800 MG/1
800 TABLET ORAL 3 TIMES DAILY
Qty: 90 TABLET | Refills: 5 | Status: SHIPPED | OUTPATIENT
Start: 2022-10-10 | End: 2022-11-09

## 2022-10-10 NOTE — TELEPHONE ENCOUNTER
Patients last appointment 8/11/2022.   Patients next scheduled appointment   Future Appointments   Date Time Provider Donna Crooks   11/16/2022  1:00 PM ACE George CNP [facility]

## 2022-11-08 DIAGNOSIS — F98.8 ATTENTION DEFICIT DISORDER (ADD) WITHOUT HYPERACTIVITY: ICD-10-CM

## 2022-11-08 RX ORDER — GABAPENTIN 800 MG/1
800 TABLET ORAL 3 TIMES DAILY
Qty: 90 TABLET | Refills: 5 | Status: SHIPPED | OUTPATIENT
Start: 2022-11-08 | End: 2022-12-08

## 2022-11-08 RX ORDER — DEXTROAMPHETAMINE SACCHARATE, AMPHETAMINE ASPARTATE MONOHYDRATE, DEXTROAMPHETAMINE SULFATE AND AMPHETAMINE SULFATE 7.5; 7.5; 7.5; 7.5 MG/1; MG/1; MG/1; MG/1
30 CAPSULE, EXTENDED RELEASE ORAL EVERY MORNING
Qty: 30 CAPSULE | Refills: 0 | Status: SHIPPED
Start: 2022-11-08 | End: 2022-11-09 | Stop reason: SDUPTHER

## 2022-11-08 NOTE — TELEPHONE ENCOUNTER
Patients last appointment 8/11/2022.   Patients next scheduled appointment   Future Appointments   Date Time Provider Donna Crooks   11/16/2022  1:00 PM ACE Valdivia CNP [facility]

## 2022-11-09 DIAGNOSIS — F98.8 ATTENTION DEFICIT DISORDER (ADD) WITHOUT HYPERACTIVITY: ICD-10-CM

## 2022-11-09 RX ORDER — DEXTROAMPHETAMINE SACCHARATE, AMPHETAMINE ASPARTATE MONOHYDRATE, DEXTROAMPHETAMINE SULFATE AND AMPHETAMINE SULFATE 7.5; 7.5; 7.5; 7.5 MG/1; MG/1; MG/1; MG/1
30 CAPSULE, EXTENDED RELEASE ORAL EVERY MORNING
Qty: 30 CAPSULE | Refills: 0 | Status: SHIPPED | OUTPATIENT
Start: 2022-11-09 | End: 2022-12-09

## 2022-11-23 ENCOUNTER — OFFICE VISIT (OUTPATIENT)
Dept: FAMILY MEDICINE CLINIC | Age: 42
End: 2022-11-23
Payer: COMMERCIAL

## 2022-11-23 VITALS
HEIGHT: 70 IN | OXYGEN SATURATION: 95 % | HEART RATE: 111 BPM | SYSTOLIC BLOOD PRESSURE: 118 MMHG | BODY MASS INDEX: 27.59 KG/M2 | TEMPERATURE: 97.7 F | WEIGHT: 192.7 LBS | DIASTOLIC BLOOD PRESSURE: 76 MMHG | RESPIRATION RATE: 17 BRPM

## 2022-11-23 DIAGNOSIS — Z72.0 TOBACCO USE: ICD-10-CM

## 2022-11-23 DIAGNOSIS — G56.21 ULNAR NEUROPATHY AT ELBOW, RIGHT: ICD-10-CM

## 2022-11-23 DIAGNOSIS — M50.20 CERVICAL HERNIATED DISC: ICD-10-CM

## 2022-11-23 DIAGNOSIS — F31.9 BIPOLAR AFFECTIVE DISORDER, REMISSION STATUS UNSPECIFIED (HCC): ICD-10-CM

## 2022-11-23 DIAGNOSIS — I10 ESSENTIAL HYPERTENSION: ICD-10-CM

## 2022-11-23 DIAGNOSIS — M54.16 LUMBAR RADICULOPATHY: ICD-10-CM

## 2022-11-23 DIAGNOSIS — H04.123 DRY EYES: Primary | ICD-10-CM

## 2022-11-23 PROCEDURE — G8427 DOCREV CUR MEDS BY ELIG CLIN: HCPCS | Performed by: NURSE PRACTITIONER

## 2022-11-23 PROCEDURE — G8484 FLU IMMUNIZE NO ADMIN: HCPCS | Performed by: NURSE PRACTITIONER

## 2022-11-23 PROCEDURE — 99213 OFFICE O/P EST LOW 20 MIN: CPT | Performed by: NURSE PRACTITIONER

## 2022-11-23 PROCEDURE — 3074F SYST BP LT 130 MM HG: CPT | Performed by: NURSE PRACTITIONER

## 2022-11-23 PROCEDURE — 3078F DIAST BP <80 MM HG: CPT | Performed by: NURSE PRACTITIONER

## 2022-11-23 PROCEDURE — G8419 CALC BMI OUT NRM PARAM NOF/U: HCPCS | Performed by: NURSE PRACTITIONER

## 2022-11-23 PROCEDURE — 4004F PT TOBACCO SCREEN RCVD TLK: CPT | Performed by: NURSE PRACTITIONER

## 2022-11-23 RX ORDER — POLYETHYLENE GLYCOL AND PROPYLENE GLYCOL 4; 3 MG/ML; MG/ML
1 GEL OPHTHALMIC 3 TIMES DAILY
Qty: 10 ML | Refills: 0 | Status: SHIPPED | OUTPATIENT
Start: 2022-11-23

## 2022-11-23 RX ORDER — IBUPROFEN 800 MG/1
800 TABLET ORAL EVERY 6 HOURS PRN
Qty: 120 TABLET | Refills: 1 | Status: SHIPPED | OUTPATIENT
Start: 2022-11-23 | End: 2022-12-23

## 2022-11-23 RX ORDER — ONDANSETRON 4 MG/1
4 TABLET, FILM COATED ORAL 3 TIMES DAILY PRN
Qty: 30 TABLET | Refills: 0 | Status: SHIPPED | OUTPATIENT
Start: 2022-11-23

## 2022-11-23 ASSESSMENT — ENCOUNTER SYMPTOMS
RECTAL PAIN: 0
ABDOMINAL PAIN: 0
BACK PAIN: 1
VOMITING: 0
NAUSEA: 0
BLOOD IN STOOL: 0
DIARRHEA: 0

## 2022-11-23 NOTE — PROGRESS NOTES
Amanda Funes : 1980 Sex: male  Age: 36 y.o. Chief Complaint   Patient presents with    Medication Check     3 month follow up       Assessment and Plan:    Alvaro Bolton was seen today for medication check. Diagnoses and all orders for this visit:    Dry eyes    Ulnar neuropathy at elbow, right  -     ibuprofen (ADVIL;MOTRIN) 800 MG tablet; Take 1 tablet by mouth every 6 hours as needed for Pain    Essential hypertension  -     Comprehensive Metabolic Panel; Future  -     CBC with Auto Differential; Future    Lumbar radiculopathy    Cervical herniated disc    Tobacco use    Bipolar affective disorder, remission status unspecified ([facility]    Other orders  -     ondansetron (ZOFRAN) 4 MG tablet; Take 1 tablet by mouth 3 times daily as needed for Nausea or Vomiting  -     polyethylene glycol-propylene glycol (DRY EYE RELIEF) 0.4-0.3 % GEL ophthalmic gel; Place 1 drop into both eyes 3 times daily      USPTF:    No results found for: LABA1C  No results found for: EAG   Abnormal Blood Glucose and Type 2 Diabetes Mellitus: Screening -- Adults aged 36 to 79 years who are overweight or obese Grade: B (Recommended)    BP Readings from Last 3 Encounters:   22 118/76   22 128/80   05/10/22 136/80     High Blood Pressure: Screening and Home Monitoring -- Adults  Grade: A (Recommended) recommends screening for high blood pressure in ages 25 years or older. obtain measurements outside of the clinical setting for diagnostic confirmation before starting treatment. Annual screening for adults aged 36 years or older or those who are at increased risk for blood pressure    (  ) Colorectal Cancer: Screening --Adults aged 48 to 76 years  Grade: A (Recommended) recommends screening for colorectal cancer starting at age 48 years and continuing until age 76 years.      Lab Results   Component Value Date    CHOL 170 05/10/2022    CHOL 232 (H) 2021    CHOL 177 2020     Lab Results   Component Value Date TRIG 157 (H) 05/10/2022    TRIG 167 (H) 11/02/2021    TRIG 135 12/28/2020     Lab Results   Component Value Date    HDL 44 05/10/2022    HDL 40 11/02/2021    HDL 45 12/28/2020     Lab Results   Component Value Date    LDLCALC 95 05/10/2022    LDLCALC 159 (H) 11/02/2021    LDLCALC 105 (H) 12/28/2020     Lab Results   Component Value Date    LABVLDL 31 05/10/2022    LABVLDL 33 11/02/2021    LABVLDL 27 12/28/2020     No results found for: CHOLHDLRATIO   (  )  Lipid Disorders in Adults: Screening -- Men 28 and Older  Grade: A (Recommended) recommends screening men aged 28 and older for lipid disorders. Alcohol Use: Not on file      (  ) Alcohol Misuse: Screening and Behavioral Counseling Interventions in Primary Care -- Adults  Grade: B (Recommended) recommends that clinicians screen adults aged 25 years or older for alcohol misuse and provide persons engaged in risky or hazardous drinking with brief behavioral counseling interventions to reduce alcohol misuse. (  ) Abdominal Aortic Aneurysm: Screening -- Men Ages 72 to 76 Years Who Have Ever Smoked. Grade: B (Recommended) recommends one-time screening for abdominal aortic aneurysm (AAA) with ultrasonography in men ages 72 to 76 years who have ever smoked. (  )  Lung Cancer: Screening -- Adults Ages 46-80 who have a 30 pack-year smoking history and currently smoke or have quit within the past 15 years  Grade: B (Recommended) recommends annual screening for lung cancer with low-dose computed tomography (LDCT) in adults aged 54 to [de-identified] years who have a 30 pack-year smoking history and currently smoke or have quit within the past 15 years. Screening should be discontinued once a person has not smoked for 15 years or develops a health problem that substantially limits life expectancy or the ability or willingness to have curative lung surgery.      Estimated body mass index is 27.65 kg/m² as calculated from the following:    Height as of this encounter: 5' 10\" (1.778 m). Weight as of this encounter: 192 lb 11.2 oz (87.4 kg). (  )  Obesity: Screening for and Management of-- All Adults  Grade: B(Recommended) recommends screening all adults for obesity. Clinicians should offer or refer patients with a body mass index (BMI) of 30 kg/m2 or higher to intensive, multicomponent behavioral interventions. (Not a fall risk)  Fall Prevention -- Exercise/Physical Therapy: Community-dwelling Adults 72 Years or Older, Increased Risk for Falls   Grade: B (Recommended) recommends exercise or physical therapy to prevent falls in community-dwelling adults aged 72 years or older who are at increased risk for falls. (No new symptoms noted or reported today)  Depression: Screening -- General adult population, including pregnant and postpartum women  Grade: B(Recommended) recommends screening for depression in the general adult population,  Screening should be implemented with adequate systems in place to ensure accurate diagnosis, effective treatment, and appropriate follow-up. (  ) Glaucoma: Screening - Adults and Diabetic Eye Exam      (  ) Thyroid Dysfunction: Screening --      No results found for: PSA, PSADIA   (  ) Prostate Cancer: Prostate-Specific Antigen (PSA)-Based Screening -- All Men  PSA     (  ) Vitamin D Deficiency: Screening --      (  ) Skin Cancer: Screening --Asymptomatic adults Grade: I(Uncertain)     (  ) Carotid Artery Stenosis: Screening -- Adults Grade: D (Not Recommended)     (  ) Coronary Heart Disease: Screening with Electrocardiography--Adults at Low Risk Grade: D (Not Recommended)       Educational materials printed for patient's review and were included in patient instructions on his After Visit Summary and given to patient at the end of visit. Counseled regarding above diagnosis, including possible risks and complications,  especially if left uncontrolled.      Counseled regarding the possible side effects, risks, benefits and alternatives to treatment; patient and/or guardian verbalizes understanding, agrees, feels comfortable with and wishes to proceed with above treatment plan. Advised patient to call with any new medication issues, and read all Rx info from pharmacy to assure aware of all possible risks and side effects of medication before taking. Reviewed age and gender appropriate health screening exams and vaccinations. Advised patient regarding importance of keeping up with recommended health maintenance and to schedule as soon as possible if overdue, as this is important in assessing for undiagnosed pathology, especially cancer, as well as protecting against potentially harmful/life threatening disease. Patient verbalizes understanding and agrees with above counseling, assessment and plan. All questions answered. Return in about 3 months (around 2/23/2023) for Routine Visit with Labs. Patient is here today to review medication and have his 3-month checkup on the Adderall and Xanax. Reports no problems at this time dosage seems to be working well. Feels to be more productive and focusing is exponentially better. There has been no reports of chest pain, palpitations, nausea, vomiting, or uncontrolled weight loss. Denies any shortness of breath. Patient continues to do optimal on the medication which speaks to a good diagnosis and medication matching. CHRONIC CONDITION:    HTN: Stable hypertension, controlled on   amLODIPine (NORVASC) 5 MG tablet, Take 1 tablet by mouth daily take 1 tablet by mouth once daily, Disp: 90 tablet, Rfl: 1 remains at a mild intensity but overall good control, without symptoms, no ringing in the ears, no headaches and no nose bleeds. Better on medications. CAD and CHF: Patient currently on a Betablocker and an ACE ARB for LVSD. Tobacco Use: N    Flu Shot Up To Date: Y     Meds Listed in Medical Record:  Y     Hyperlipidemia: Mild in intensity but controlled on   atorvastatin (LIPITOR) 10 MG tablet, Take 1 tablet by mouth daily, Disp: 90 tablet, Rfl: 11, without symptoms, no complications with dietary treatment regimen reporting no side effects or intolereances. Compliant with treatment and diet. No muscle aches, new joint pains or abd pain. Depression/Anxiety: Stable depression with generalized anxiety. Mild in intensity but well controlled on   amphetamine-dextroamphetamine (ADDERALL XR) 30 MG extended release capsule, Take 1 capsule by mouth every morning for 30 days. , Disp: 30 capsule, Rfl: 0  DULoxetine (CYMBALTA) 60 MG extended release capsule, Take 1 capsule by mouth 2 times daily, Disp: 180 capsule, Rfl: 1  lamoTRIgine (LAMICTAL) 200 MG tablet, take 1 tablet by mouth once daily, Disp: 90 tablet, Rfl: 1  gabapentin (NEURONTIN) 800 MG tablet, Take 1 tablet by mouth 3 times daily for 30 days. , Disp: 90 tablet, Rfl: 5  ALPRAZolam (XANAX) 0.5 MG tablet, take 1 tablet by mouth at bedtime for sleep, Disp: 30 tablet, Rfl: 2  ibuprofen (ADVIL;MOTRIN) 800 MG tablet, Take 1 tablet by mouth every 6 hours as needed for Pain, Disp: 120 tablet, Rfl: 1, without symptoms, no weight gain, no increase in anxiety, no suicidal or homicidal ideation's no unexplained fatigue, or relationship difficulties relayed this visit. Review of Systems   Constitutional:  Negative for activity change. Cardiovascular:  Negative for chest pain, palpitations and leg swelling. Gastrointestinal:  Negative for abdominal pain, blood in stool, diarrhea, nausea, rectal pain and vomiting. Genitourinary:  Negative for difficulty urinating, dysuria and flank pain. Musculoskeletal:  Positive for back pain and neck pain. Negative for gait problem and myalgias.        Current Outpatient Medications:     ibuprofen (ADVIL;MOTRIN) 800 MG tablet, Take 1 tablet by mouth every 6 hours as needed for Pain, Disp: 120 tablet, Rfl: 1    ondansetron (ZOFRAN) 4 MG tablet, Take 1 tablet by mouth 3 times daily as needed for Nausea or Vomiting, Disp: 30 tablet, Rfl: 0    polyethylene glycol-propylene glycol (DRY EYE RELIEF) 0.4-0.3 % GEL ophthalmic gel, Place 1 drop into both eyes 3 times daily, Disp: 10 mL, Rfl: 0    amphetamine-dextroamphetamine (ADDERALL XR) 30 MG extended release capsule, Take 1 capsule by mouth every morning for 30 days. , Disp: 30 capsule, Rfl: 0    gabapentin (NEURONTIN) 800 MG tablet, Take 1 tablet by mouth 3 times daily for 30 days. , Disp: 90 tablet, Rfl: 5    ALPRAZolam (XANAX) 0.5 MG tablet, take 1 tablet by mouth at bedtime for sleep, Disp: 30 tablet, Rfl: 2    amLODIPine (NORVASC) 5 MG tablet, Take 1 tablet by mouth daily take 1 tablet by mouth once daily, Disp: 90 tablet, Rfl: 1    atorvastatin (LIPITOR) 10 MG tablet, Take 1 tablet by mouth daily, Disp: 90 tablet, Rfl: 1    DULoxetine (CYMBALTA) 60 MG extended release capsule, Take 1 capsule by mouth 2 times daily, Disp: 180 capsule, Rfl: 1    lamoTRIgine (LAMICTAL) 200 MG tablet, take 1 tablet by mouth once daily, Disp: 90 tablet, Rfl: 1  Allergies   Allergen Reactions    Latex Rash     condoms    Norco [Hydrocodone-Acetaminophen] Nausea Only    Pcn [Penicillins] Hives       Past Medical History:   Diagnosis Date    ADHD     Anxiety     Bipolar 1 disorder ([facility]     Cephalgia 2/4/2019    Headache(784.0)     History of kidney stones     HTN (hypertension) 2/19/2015    Hypertension     Pain induced    Neuropathy     right ulnar    Palpitation 02/19/2015    follows Dr Iman Montejo    Right carpal tunnel syndrome      Past Surgical History:   Procedure Laterality Date    BACK SURGERY      BREAST LUMPECTOMY      CARPAL TUNNEL RELEASE Right 2/15/2019    RIGHT ENDOSCOPIC CARPAL TUNNEL RELEASE performed by Laura Jordan MD at [address] [place] [address] [address]     lithotripsy    LUMBAR LAMINECTOMY  4/22/2015    discectomy L5 - S1    FL DISK SURG,ANTER,CERVICAL,SINGLE LVL N/A 7/20/2018    ANTERIOR CERVICAL DISCECTOMY AND FUSION  C5-C6  WITH PLATES, SCREWS, ONE C-ARM, REGULAR BED WITH HORSE SHOE - [employer] -- BOP performed by Mona Wood MD at [address] Right 2/15/2019    RIGHT ULNAR NERVE IN SITU DECOMPRESSION WITH TRANSPOSITION performed by Anita Bauer MD at [facility] History   Problem Relation Age of Onset    COPD Mother     Cancer Mother     Colon Cancer Father     Seizures Sister     Other Sister         brain tumor    Cancer Paternal Aunt     Heart Disease Paternal Uncle     Heart Disease Paternal Grandmother      Social History     Socioeconomic History    Marital status:      Spouse name: Not on file    Number of children: Not on file    Years of education: Not on file    Highest education level: Not on file   Occupational History    Not on file   Tobacco Use    Smoking status: Every Day     Packs/day: 0.50     Years: 15.00     Pack years: 7.50     Types: Cigarettes    Smokeless tobacco: Never    Tobacco comments:     smoke a little less than a ppd   Vaping Use    Vaping Use: Never used   Substance and Sexual Activity    Alcohol use: No    Drug use: No     Comment: former marijuana user    Sexual activity: Not on file   Other Topics Concern    Not on file   Social History Narrative    Not on file     Social Determinants of Health     Financial Resource Strain: Low Risk     Difficulty of Paying Living Expenses: Not hard at all   Food Insecurity: No Food Insecurity    Worried About Running Out of Food in the Last Year: Never true    Ran Out of Food in the Last Year: Never true   Transportation Needs: Not on file   Physical Activity: Not on file   Stress: Not on file   Social Connections: Not on file   Intimate Partner Violence: Not on file   Housing Stability: Not on file       Vitals:    11/23/22 1435   BP: 118/76   Site: Left Upper Arm   Position: Sitting   Cuff Size: Large Adult   Pulse: (!) 111   Resp: 17   Temp: 97.7 °F (36.5 °C)   TempSrc: Temporal   SpO2: 95%   Weight: 192 lb 11.2 oz (87.4 kg)   Height: 5' 10\" (1.778 m)       Physical Exam  Constitutional:       Appearance: Normal appearance. HENT:      Head: Normocephalic. Nose: Nose normal.   Eyes:      Pupils: Pupils are equal, round, and reactive to light. Cardiovascular:      Rate and Rhythm: Normal rate and regular rhythm. Pulmonary:      Effort: Pulmonary effort is normal.   Abdominal:      General: Abdomen is flat. Palpations: Abdomen is soft. Musculoskeletal:         General: Normal range of motion. Skin:     General: Skin is warm and dry. Neurological:      General: No focal deficit present. Mental Status: He is alert. Mental status is at baseline.    Psychiatric:         Mood and Affect: Mood normal.              Seen By:  ACE Carranza CNP

## 2022-12-05 DIAGNOSIS — F41.9 ANXIETY: ICD-10-CM

## 2022-12-05 RX ORDER — ALPRAZOLAM 0.5 MG/1
TABLET ORAL
Qty: 30 TABLET | Refills: 2 | Status: SHIPPED | OUTPATIENT
Start: 2022-12-05 | End: 2023-03-04

## 2022-12-05 NOTE — TELEPHONE ENCOUNTER
Patients last appointment 11/23/2022. Patients next scheduled appointment   Future Appointments   Date Time Provider Donna Crooks   12/6/2022 10:15 AM ACE Thomas CNP [facility]   2/9/2023  8:05 AM SCHEDULE, KAITLYNN COULTER PAL [facility]   2/15/2023  9:15 AM ACE Thomas CNP PAL [facility]

## 2022-12-06 ENCOUNTER — OFFICE VISIT (OUTPATIENT)
Dept: FAMILY MEDICINE CLINIC | Age: 42
End: 2022-12-06
Payer: COMMERCIAL

## 2022-12-06 VITALS
DIASTOLIC BLOOD PRESSURE: 82 MMHG | OXYGEN SATURATION: 98 % | HEART RATE: 80 BPM | WEIGHT: 194 LBS | RESPIRATION RATE: 18 BRPM | BODY MASS INDEX: 27.77 KG/M2 | TEMPERATURE: 97.7 F | HEIGHT: 70 IN | SYSTOLIC BLOOD PRESSURE: 124 MMHG

## 2022-12-06 DIAGNOSIS — U07.1 COVID-19 VIRUS INFECTION: Primary | ICD-10-CM

## 2022-12-06 PROCEDURE — 99213 OFFICE O/P EST LOW 20 MIN: CPT | Performed by: NURSE PRACTITIONER

## 2022-12-06 PROCEDURE — 4004F PT TOBACCO SCREEN RCVD TLK: CPT | Performed by: NURSE PRACTITIONER

## 2022-12-06 PROCEDURE — 3074F SYST BP LT 130 MM HG: CPT | Performed by: NURSE PRACTITIONER

## 2022-12-06 PROCEDURE — G8427 DOCREV CUR MEDS BY ELIG CLIN: HCPCS | Performed by: NURSE PRACTITIONER

## 2022-12-06 PROCEDURE — G8419 CALC BMI OUT NRM PARAM NOF/U: HCPCS | Performed by: NURSE PRACTITIONER

## 2022-12-06 PROCEDURE — G8484 FLU IMMUNIZE NO ADMIN: HCPCS | Performed by: NURSE PRACTITIONER

## 2022-12-06 PROCEDURE — 3078F DIAST BP <80 MM HG: CPT | Performed by: NURSE PRACTITIONER

## 2022-12-06 NOTE — LETTER
NOTIFICATION RETURN TO WORK / SCHOOL    12/6/2022    Mr. [address] [address]      To Whom It May Concern:    Jeanette Morgan was tested for COVID-19 on 12/5, and the result was negative after being positive on 11/26 . He may return to work today. I recommend:return without restrictions    If there are questions or concerns, please have the patient contact our office.         Sincerely,          ACE Dey - CNP

## 2022-12-06 NOTE — PROGRESS NOTES
Chief Complaint   Follow-up (Patient was Covid positive last week via 2 home test. Patient reports no symptoms. Patient is requesting absence excuse to return to work this afternoon. )      History of Present Illness   Source of history provided by:  patient. Lacy Mao is a 36 y.o. old male who presents to the flu clinic with complaints of Follow-up (Patient was Covid positive last week via 2 home test. Patient reports no symptoms. Patient is requesting absence excuse to return to work this afternoon. ) x 10 days. States symptoms have improved since onset. Has been taking none without symptomatic relief. Denies any Shortness of breath. Denies any hx of no history of pneumonia or bronchitis. ROS   Pertinent positives and negatives are stated within HPI, all other systems reviewed and are negative. Past Medical History:  has a past medical history of ADHD, Anxiety, Bipolar 1 disorder ([address] Cephalgia, Headache(784.0), History of kidney stones, HTN (hypertension), Hypertension, Neuropathy, Palpitation, and Right carpal tunnel syndrome. Past Surgical History:  has a past surgical history that includes Kidney surgery (Left); Breast lumpectomy; lumbar laminectomy (4/22/2015); back surgery; pr disk surg,anter,cervical,single lvl (N/A, 7/20/2018); Carpal tunnel release (Right, 2/15/2019); and pr repair major peripheral nerve (Right, 2/15/2019). Social History:  reports that he has been smoking cigarettes. He has a 7.50 pack-year smoking history. He has never used smokeless tobacco. He reports that he does not drink alcohol and does not use drugs. Family History: family history includes COPD in his mother; Cancer in his mother and paternal aunt; Colon Cancer in his father; Heart Disease in his paternal grandmother and paternal uncle; Other in his sister; Seizures in his sister.   Allergies: Latex, Norco [hydrocodone-acetaminophen], and Pcn [penicillins]    Physical Exam   Vital Signs:  /82 (Site: Left Upper Arm, Position: Sitting, Cuff Size: Large Adult)   Pulse 80   Temp 97.7 °F (36.5 °C) (Temporal)   Resp 18   Ht 5' 10\" (1.778 m)   Wt 194 lb (88 kg)   SpO2 98%   BMI 27.84 kg/m²    Oxygen Saturation Interpretation: Normal.    Constitutional:  Alert, development consistent with age. NAD. Head:  NC/NT. Airway patent. Ears: TMs Clear bilaterally. Canals without exudate or swelling bilaterally. Mouth: Posterior pharynx with mild erythema and clear postnasal drip. no tonsillar hypertrophy or exudate. Neck:  Normal ROM. Supple. no anterior cervical adenopathy noted. Lungs: CTAB without wheezes, rales, or rhonchi. CV:  Regular rate and rhythm, normal heart sounds, without pathological murmurs, ectopy, gallops, or rubs. Skin:  Normal turgor. Warm, dry, without visible rash. Lymphatic: No lymphangitis or adenopathy noted. Neurological:  Oriented. Motor functions intact. Lab / Imaging Results   (All laboratory and radiology results have been personally reviewed by myself)  Labs:  No results found for this visit on 12/06/22. Imaging: All Radiology results interpreted by Radiologist unless otherwise noted. No results found. Medical Decision Making   Pt non-toxic, in no apparent distress and stable at time of discharge. Assessment/Plan   Micah Bennett was seen today for follow-up. Diagnoses and all orders for this visit:    COVID-19 virus infection         Rapid COVID-19 negative in office, patient advised results. Fran Hoff, ACE - CNP    This visit was provided as a focused evaluation during the COVID -19 pandemic/national emergency. A comprehensive review of all previous patient history and testing was not conducted. Pertinent findings were elicited during the visit. *NOTE: This report was transcribed using voice recognition software. Every effort was made to ensure accuracy; however, inadvertent computerized transcription errors may be present.

## 2022-12-07 DIAGNOSIS — F98.8 ATTENTION DEFICIT DISORDER (ADD) WITHOUT HYPERACTIVITY: ICD-10-CM

## 2022-12-07 RX ORDER — DEXTROAMPHETAMINE SACCHARATE, AMPHETAMINE ASPARTATE MONOHYDRATE, DEXTROAMPHETAMINE SULFATE AND AMPHETAMINE SULFATE 7.5; 7.5; 7.5; 7.5 MG/1; MG/1; MG/1; MG/1
30 CAPSULE, EXTENDED RELEASE ORAL EVERY MORNING
Qty: 30 CAPSULE | Refills: 0 | Status: SHIPPED | OUTPATIENT
Start: 2022-12-07 | End: 2023-01-06

## 2023-01-20 DIAGNOSIS — I10 ESSENTIAL HYPERTENSION: ICD-10-CM

## 2023-01-20 RX ORDER — AMLODIPINE BESYLATE 5 MG/1
5 TABLET ORAL DAILY
Qty: 90 TABLET | Refills: 1 | Status: SHIPPED | OUTPATIENT
Start: 2023-01-20 | End: 2023-04-20

## 2023-01-20 RX ORDER — ATORVASTATIN CALCIUM 10 MG/1
10 TABLET, FILM COATED ORAL DAILY
Qty: 90 TABLET | Refills: 1 | Status: SHIPPED | OUTPATIENT
Start: 2023-01-20 | End: 2023-04-20

## 2023-01-20 RX ORDER — DULOXETIN HYDROCHLORIDE 60 MG/1
60 CAPSULE, DELAYED RELEASE ORAL 2 TIMES DAILY
Qty: 180 CAPSULE | Refills: 1 | Status: SHIPPED | OUTPATIENT
Start: 2023-01-20 | End: 2023-04-20

## 2023-01-20 RX ORDER — LAMOTRIGINE 200 MG/1
TABLET ORAL
Qty: 90 TABLET | Refills: 1 | Status: SHIPPED | OUTPATIENT
Start: 2023-01-20

## 2023-01-31 DIAGNOSIS — F98.8 ATTENTION DEFICIT DISORDER (ADD) WITHOUT HYPERACTIVITY: ICD-10-CM

## 2023-01-31 DIAGNOSIS — F41.9 ANXIETY: ICD-10-CM

## 2023-01-31 RX ORDER — DEXTROAMPHETAMINE SACCHARATE, AMPHETAMINE ASPARTATE MONOHYDRATE, DEXTROAMPHETAMINE SULFATE AND AMPHETAMINE SULFATE 7.5; 7.5; 7.5; 7.5 MG/1; MG/1; MG/1; MG/1
30 CAPSULE, EXTENDED RELEASE ORAL EVERY MORNING
Qty: 30 CAPSULE | Refills: 0 | Status: SHIPPED | OUTPATIENT
Start: 2023-01-31 | End: 2023-03-02

## 2023-02-22 ENCOUNTER — OFFICE VISIT (OUTPATIENT)
Dept: FAMILY MEDICINE CLINIC | Age: 43
End: 2023-02-22

## 2023-02-22 VITALS
RESPIRATION RATE: 17 BRPM | WEIGHT: 187.3 LBS | OXYGEN SATURATION: 96 % | TEMPERATURE: 97.8 F | DIASTOLIC BLOOD PRESSURE: 86 MMHG | HEART RATE: 77 BPM | SYSTOLIC BLOOD PRESSURE: 124 MMHG | BODY MASS INDEX: 26.81 KG/M2 | HEIGHT: 70 IN

## 2023-02-22 DIAGNOSIS — Z72.0 TOBACCO USE: ICD-10-CM

## 2023-02-22 DIAGNOSIS — G40.319 INTRACTABLE GENERALIZED IDIOPATHIC EPILEPSY WITHOUT STATUS EPILEPTICUS (HCC): ICD-10-CM

## 2023-02-22 DIAGNOSIS — F41.9 ANXIETY: ICD-10-CM

## 2023-02-22 DIAGNOSIS — Z98.890 HISTORY OF LUMBAR LAMINECTOMY: ICD-10-CM

## 2023-02-22 DIAGNOSIS — M54.16 LUMBAR RADICULOPATHY: ICD-10-CM

## 2023-02-22 DIAGNOSIS — E78.01 FAMILIAL HYPERCHOLESTEROLEMIA: ICD-10-CM

## 2023-02-22 DIAGNOSIS — F31.9 BIPOLAR AFFECTIVE DISORDER, REMISSION STATUS UNSPECIFIED (HCC): ICD-10-CM

## 2023-02-22 DIAGNOSIS — F98.8 ATTENTION DEFICIT DISORDER (ADD) WITHOUT HYPERACTIVITY: ICD-10-CM

## 2023-02-22 DIAGNOSIS — I10 ESSENTIAL HYPERTENSION: ICD-10-CM

## 2023-02-22 DIAGNOSIS — I10 ESSENTIAL HYPERTENSION: Primary | ICD-10-CM

## 2023-02-22 DIAGNOSIS — G56.21 ULNAR NEUROPATHY AT ELBOW, RIGHT: ICD-10-CM

## 2023-02-22 LAB
ALBUMIN SERPL-MCNC: 5 G/DL (ref 3.5–5.2)
ALP BLD-CCNC: 73 U/L (ref 40–129)
ALT SERPL-CCNC: 22 U/L (ref 0–40)
ANION GAP SERPL CALCULATED.3IONS-SCNC: 13 MMOL/L (ref 7–16)
AST SERPL-CCNC: 28 U/L (ref 0–39)
BASOPHILS ABSOLUTE: 0.06 E9/L (ref 0–0.2)
BASOPHILS RELATIVE PERCENT: 0.7 % (ref 0–2)
BILIRUB SERPL-MCNC: 0.4 MG/DL (ref 0–1.2)
BUN BLDV-MCNC: 11 MG/DL (ref 6–20)
CALCIUM SERPL-MCNC: 9.8 MG/DL (ref 8.6–10.2)
CHLORIDE BLD-SCNC: 101 MMOL/L (ref 98–107)
CO2: 27 MMOL/L (ref 22–29)
CREAT SERPL-MCNC: 0.9 MG/DL (ref 0.7–1.2)
EOSINOPHILS ABSOLUTE: 0.89 E9/L (ref 0.05–0.5)
EOSINOPHILS RELATIVE PERCENT: 9.7 % (ref 0–6)
GFR SERPL CREATININE-BSD FRML MDRD: >60 ML/MIN/1.73
GLUCOSE BLD-MCNC: 85 MG/DL (ref 74–99)
HCT VFR BLD CALC: 46.2 % (ref 37–54)
HEMOGLOBIN: 15.3 G/DL (ref 12.5–16.5)
IMMATURE GRANULOCYTES #: 0.01 E9/L
IMMATURE GRANULOCYTES %: 0.1 % (ref 0–5)
LYMPHOCYTES ABSOLUTE: 2.46 E9/L (ref 1.5–4)
LYMPHOCYTES RELATIVE PERCENT: 26.7 % (ref 20–42)
MCH RBC QN AUTO: 29.1 PG (ref 26–35)
MCHC RBC AUTO-ENTMCNC: 33.1 % (ref 32–34.5)
MCV RBC AUTO: 87.8 FL (ref 80–99.9)
MONOCYTES ABSOLUTE: 0.65 E9/L (ref 0.1–0.95)
MONOCYTES RELATIVE PERCENT: 7 % (ref 2–12)
NEUTROPHILS ABSOLUTE: 5.15 E9/L (ref 1.8–7.3)
NEUTROPHILS RELATIVE PERCENT: 55.8 % (ref 43–80)
PDW BLD-RTO: 13.6 FL (ref 11.5–15)
PLATELET # BLD: 372 E9/L (ref 130–450)
PMV BLD AUTO: 9.7 FL (ref 7–12)
POTASSIUM SERPL-SCNC: 4.6 MMOL/L (ref 3.5–5)
RBC # BLD: 5.26 E12/L (ref 3.8–5.8)
SODIUM BLD-SCNC: 141 MMOL/L (ref 132–146)
TOTAL PROTEIN: 7.7 G/DL (ref 6.4–8.3)
WBC # BLD: 9.2 E9/L (ref 4.5–11.5)

## 2023-02-22 RX ORDER — ALPRAZOLAM 0.5 MG/1
TABLET ORAL
Qty: 30 TABLET | Refills: 2 | Status: SHIPPED | OUTPATIENT
Start: 2023-02-22 | End: 2023-05-22

## 2023-02-22 RX ORDER — GABAPENTIN 800 MG/1
800 TABLET ORAL 3 TIMES DAILY
Qty: 90 TABLET | Refills: 5 | Status: SHIPPED | OUTPATIENT
Start: 2023-02-22 | End: 2023-03-24

## 2023-02-22 RX ORDER — ONDANSETRON 4 MG/1
4 TABLET, FILM COATED ORAL 3 TIMES DAILY PRN
Qty: 30 TABLET | Refills: 0 | Status: SHIPPED | OUTPATIENT
Start: 2023-02-22

## 2023-02-22 RX ORDER — DEXTROAMPHETAMINE SACCHARATE, AMPHETAMINE ASPARTATE MONOHYDRATE, DEXTROAMPHETAMINE SULFATE AND AMPHETAMINE SULFATE 7.5; 7.5; 7.5; 7.5 MG/1; MG/1; MG/1; MG/1
30 CAPSULE, EXTENDED RELEASE ORAL EVERY MORNING
Qty: 30 CAPSULE | Refills: 0 | Status: SHIPPED | OUTPATIENT
Start: 2023-02-22 | End: 2023-03-24

## 2023-02-22 RX ORDER — IBUPROFEN 800 MG/1
800 TABLET ORAL EVERY 6 HOURS PRN
Qty: 120 TABLET | Refills: 1 | Status: SHIPPED | OUTPATIENT
Start: 2023-02-22 | End: 2023-03-24

## 2023-02-22 SDOH — ECONOMIC STABILITY: FOOD INSECURITY: WITHIN THE PAST 12 MONTHS, THE FOOD YOU BOUGHT JUST DIDN'T LAST AND YOU DIDN'T HAVE MONEY TO GET MORE.: NEVER TRUE

## 2023-02-22 SDOH — ECONOMIC STABILITY: HOUSING INSECURITY
IN THE LAST 12 MONTHS, WAS THERE A TIME WHEN YOU DID NOT HAVE A STEADY PLACE TO SLEEP OR SLEPT IN A SHELTER (INCLUDING NOW)?: NO

## 2023-02-22 SDOH — ECONOMIC STABILITY: FOOD INSECURITY: WITHIN THE PAST 12 MONTHS, YOU WORRIED THAT YOUR FOOD WOULD RUN OUT BEFORE YOU GOT MONEY TO BUY MORE.: NEVER TRUE

## 2023-02-22 SDOH — ECONOMIC STABILITY: INCOME INSECURITY: HOW HARD IS IT FOR YOU TO PAY FOR THE VERY BASICS LIKE FOOD, HOUSING, MEDICAL CARE, AND HEATING?: NOT HARD AT ALL

## 2023-02-22 ASSESSMENT — PATIENT HEALTH QUESTIONNAIRE - PHQ9
3. TROUBLE FALLING OR STAYING ASLEEP: 2
SUM OF ALL RESPONSES TO PHQ QUESTIONS 1-9: 16
10. IF YOU CHECKED OFF ANY PROBLEMS, HOW DIFFICULT HAVE THESE PROBLEMS MADE IT FOR YOU TO DO YOUR WORK, TAKE CARE OF THINGS AT HOME, OR GET ALONG WITH OTHER PEOPLE: 2
9. THOUGHTS THAT YOU WOULD BE BETTER OFF DEAD, OR OF HURTING YOURSELF: 0
4. FEELING TIRED OR HAVING LITTLE ENERGY: 3
7. TROUBLE CONCENTRATING ON THINGS, SUCH AS READING THE NEWSPAPER OR WATCHING TELEVISION: 0
SUM OF ALL RESPONSES TO PHQ QUESTIONS 1-9: 16
2. FEELING DOWN, DEPRESSED OR HOPELESS: 2
6. FEELING BAD ABOUT YOURSELF - OR THAT YOU ARE A FAILURE OR HAVE LET YOURSELF OR YOUR FAMILY DOWN: 2
SUM OF ALL RESPONSES TO PHQ QUESTIONS 1-9: 16
5. POOR APPETITE OR OVEREATING: 3
8. MOVING OR SPEAKING SO SLOWLY THAT OTHER PEOPLE COULD HAVE NOTICED. OR THE OPPOSITE, BEING SO FIGETY OR RESTLESS THAT YOU HAVE BEEN MOVING AROUND A LOT MORE THAN USUAL: 2
1. LITTLE INTEREST OR PLEASURE IN DOING THINGS: 2
SUM OF ALL RESPONSES TO PHQ9 QUESTIONS 1 & 2: 4
SUM OF ALL RESPONSES TO PHQ QUESTIONS 1-9: 16

## 2023-02-22 ASSESSMENT — ENCOUNTER SYMPTOMS
RECTAL PAIN: 0
VOMITING: 0
ABDOMINAL PAIN: 0
DIARRHEA: 0
NAUSEA: 0
BACK PAIN: 1
BLOOD IN STOOL: 0

## 2023-02-22 NOTE — PROGRESS NOTES
Bart Garcia : 1980 Sex: male  Age: 36 y.o. Chief Complaint   Patient presents with    Medication Check     3 month follow up       Assessment and Plan:    Andrea Wright was seen today for medication check. Diagnoses and all orders for this visit:    Essential hypertension    Anxiety  -     ALPRAZolam (XANAX) 0.5 MG tablet; take 1 tablet by mouth at bedtime for sleep  -     ALPRAZOLAM, Quantitative, Urine; Future    Attention deficit disorder (ADD) without hyperactivity  -     amphetamine-dextroamphetamine (ADDERALL XR) 30 MG extended release capsule; Take 1 capsule by mouth every morning for 30 days.  -     AMPHETAMINE, Quantitative, Urine; Future    Ulnar neuropathy at elbow, right  -     ibuprofen (ADVIL;MOTRIN) 800 MG tablet; Take 1 tablet by mouth every 6 hours as needed for Pain    Lumbar radiculopathy    Tobacco use    Bipolar affective disorder, remission status unspecified (HCC)    Familial hypercholesterolemia    History of lumbar laminectomy    Intractable generalized idiopathic epilepsy without status epilepticus ([facility] [address]    Other orders  -     gabapentin (NEURONTIN) 800 MG tablet; Take 1 tablet by mouth 3 times daily for 30 days. -     ondansetron (ZOFRAN) 4 MG tablet; Take 1 tablet by mouth 3 times daily as needed for Nausea or Vomiting      USPTF:    No results found for: LABA1C  No results found for: EAG   Abnormal Blood Glucose and Type 2 Diabetes Mellitus: Screening -- Adults aged 36 to 79 years who are overweight or obese Grade: B (Recommended)    BP Readings from Last 3 Encounters:   23 124/86   22 124/82   22 118/76     High Blood Pressure: Screening and Home Monitoring -- Adults  Grade: A (Recommended) recommends screening for high blood pressure in ages 25 years or older. obtain measurements outside of the clinical setting for diagnostic confirmation before starting treatment.  Annual screening for adults aged 36 years or older or those who are at increased risk for blood pressure    (  ) Colorectal Cancer: Screening --Adults aged 48 to 76 years  Grade: A (Recommended) recommends screening for colorectal cancer starting at age 48 years and continuing until age 76 years. Lab Results   Component Value Date    CHOL 170 05/10/2022    CHOL 232 (H) 11/02/2021    CHOL 177 12/28/2020     Lab Results   Component Value Date    TRIG 157 (H) 05/10/2022    TRIG 167 (H) 11/02/2021    TRIG 135 12/28/2020     Lab Results   Component Value Date    HDL 44 05/10/2022    HDL 40 11/02/2021    HDL 45 12/28/2020     Lab Results   Component Value Date    LDLCALC 95 05/10/2022    LDLCALC 159 (H) 11/02/2021    LDLCALC 105 (H) 12/28/2020     Lab Results   Component Value Date    LABVLDL 31 05/10/2022    LABVLDL 33 11/02/2021    LABVLDL 27 12/28/2020     No results found for: CHOLHDLRATIO   (  )  Lipid Disorders in Adults: Screening -- Men 28 and Older  Grade: A (Recommended) recommends screening men aged 28 and older for lipid disorders. Alcohol Use: Not on file      (  ) Alcohol Misuse: Screening and Behavioral Counseling Interventions in Primary Care -- Adults  Grade: B (Recommended) recommends that clinicians screen adults aged 25 years or older for alcohol misuse and provide persons engaged in risky or hazardous drinking with brief behavioral counseling interventions to reduce alcohol misuse. Estimated body mass index is 26.87 kg/m² as calculated from the following:    Height as of this encounter: 5' 10\" (1.778 m). Weight as of this encounter: 187 lb 4.8 oz (85 kg). (  )  Obesity: Screening for and Management of-- All Adults  Grade: B(Recommended) recommends screening all adults for obesity. Clinicians should offer or refer patients with a body mass index (BMI) of 30 kg/m2 or higher to intensive, multicomponent behavioral interventions.         (Not a fall risk)  Fall Prevention -- Exercise/Physical Therapy: Community-dwelling Adults 72 Years or Older, Increased Risk for Falls   Grade: B (Recommended) recommends exercise or physical therapy to prevent falls in community-dwelling adults aged 72 years or older who are at increased risk for falls. (No new symptoms noted or reported today)  Depression: Screening -- General adult population, including pregnant and postpartum women  Grade: B(Recommended) recommends screening for depression in the general adult population,  Screening should be implemented with adequate systems in place to ensure accurate diagnosis, effective treatment, and appropriate follow-up. (  ) Glaucoma: Screening - Adults and Diabetic Eye Exam      (  ) Thyroid Dysfunction: Screening --      No results found for: PSA, PSADIA   (  ) Prostate Cancer: Prostate-Specific Antigen (PSA)-Based Screening -- All Men  PSA     (  ) Vitamin D Deficiency: Screening --      (  ) Skin Cancer: Screening --Asymptomatic adults Grade: I(Uncertain)     (  ) Carotid Artery Stenosis: Screening -- Adults Grade: D (Not Recommended)     (  ) Coronary Heart Disease: Screening with Electrocardiography--Adults at Low Risk Grade: D (Not Recommended)       Educational materials printed for patient's review and were included in patient instructions on his After Visit Summary and given to patient at the end of visit. Counseled regarding above diagnosis, including possible risks and complications,  especially if left uncontrolled. Counseled regarding the possible side effects, risks, benefits and alternatives to treatment; patient and/or guardian verbalizes understanding, agrees, feels comfortable with and wishes to proceed with above treatment plan. Advised patient to call with any new medication issues, and read all Rx info from pharmacy to assure aware of all possible risks and side effects of medication before taking. Reviewed age and gender appropriate health screening exams and vaccinations.   Advised patient regarding importance of keeping up with recommended health maintenance and to schedule as soon as possible if overdue, as this is important in assessing for undiagnosed pathology, especially cancer, as well as protecting against potentially harmful/life threatening disease. Patient verbalizes understanding and agrees with above counseling, assessment and plan. All questions answered. Return in about 3 months (around 5/22/2023) for Routine Visit with Labs. Patient is here today to review medication and have his 3-month checkup on the Adderall and Xanax. Reports no problems at this time dosage seems to be working well. Feels to be more productive and focusing is exponentially better. There has been no reports of chest pain, palpitations, nausea, vomiting, or uncontrolled weight loss. Denies any shortness of breath. Patient continues to do optimal on the medication which speaks to a good diagnosis and medication matching. CHRONIC CONDITION:    HTN: Stable hypertension, controlled on   amLODIPine (NORVASC) 5 MG tablet, Take 1 tablet by mouth daily take 1 tablet by mouth once daily, Disp: 90 tablet, Rfl: 1 remains at a mild intensity but overall good control, without symptoms, no ringing in the ears, no headaches and no nose bleeds. Better on medications. CAD and CHF: Patient currently on a Betablocker and an ACE ARB for LVSD. Tobacco Use: N    Flu Shot Up To Date: Y     Meds Listed in Medical Record: Y     Hyperlipidemia: Mild in intensity but controlled on   atorvastatin (LIPITOR) 10 MG tablet, Take 1 tablet by mouth daily, Disp: 90 tablet, Rfl: 11, without symptoms, no complications with dietary treatment regimen reporting no side effects or intolereances. Compliant with treatment and diet. No muscle aches, new joint pains or abd pain. Depression/Anxiety: Stable depression with generalized anxiety.  Mild in intensity but well controlled on   amphetamine-dextroamphetamine (ADDERALL XR) 30 MG extended release capsule, Take 1 capsule by mouth every morning for 30 days. , Disp: 30 capsule, Rfl: 0  DULoxetine (CYMBALTA) 60 MG extended release capsule, Take 1 capsule by mouth 2 times daily, Disp: 180 capsule, Rfl: 1  lamoTRIgine (LAMICTAL) 200 MG tablet, take 1 tablet by mouth once daily, Disp: 90 tablet, Rfl: 1  gabapentin (NEURONTIN) 800 MG tablet, Take 1 tablet by mouth 3 times daily for 30 days. , Disp: 90 tablet, Rfl: 5  ALPRAZolam (XANAX) 0.5 MG tablet, take 1 tablet by mouth at bedtime for sleep, Disp: 30 tablet, Rfl: 2  ibuprofen (ADVIL;MOTRIN) 800 MG tablet, Take 1 tablet by mouth every 6 hours as needed for Pain, Disp: 120 tablet, Rfl: 1, without symptoms, no weight gain, no increase in anxiety, no suicidal or homicidal ideation's no unexplained fatigue, or relationship difficulties relayed this visit. Review of Systems   Constitutional:  Negative for activity change. Cardiovascular:  Negative for chest pain, palpitations and leg swelling. Gastrointestinal:  Negative for abdominal pain, blood in stool, diarrhea, nausea, rectal pain and vomiting. Genitourinary:  Negative for difficulty urinating, dysuria and flank pain. Musculoskeletal:  Positive for back pain and neck pain. Negative for gait problem and myalgias. Current Outpatient Medications:     ALPRAZolam (XANAX) 0.5 MG tablet, take 1 tablet by mouth at bedtime for sleep, Disp: 30 tablet, Rfl: 2    amphetamine-dextroamphetamine (ADDERALL XR) 30 MG extended release capsule, Take 1 capsule by mouth every morning for 30 days. , Disp: 30 capsule, Rfl: 0    gabapentin (NEURONTIN) 800 MG tablet, Take 1 tablet by mouth 3 times daily for 30 days. , Disp: 90 tablet, Rfl: 5    ondansetron (ZOFRAN) 4 MG tablet, Take 1 tablet by mouth 3 times daily as needed for Nausea or Vomiting, Disp: 30 tablet, Rfl: 0    ibuprofen (ADVIL;MOTRIN) 800 MG tablet, Take 1 tablet by mouth every 6 hours as needed for Pain, Disp: 120 tablet, Rfl: 1    DULoxetine (CYMBALTA) 60 MG extended release capsule, Take 1 capsule by mouth 2 times daily, Disp: 180 capsule, Rfl: 1    lamoTRIgine (LAMICTAL) 200 MG tablet, take 1 tablet by mouth once daily, Disp: 90 tablet, Rfl: 1    atorvastatin (LIPITOR) 10 MG tablet, Take 1 tablet by mouth daily, Disp: 90 tablet, Rfl: 1    amLODIPine (NORVASC) 5 MG tablet, Take 1 tablet by mouth daily take 1 tablet by mouth once daily, Disp: 90 tablet, Rfl: 1    polyethylene glycol-propylene glycol (DRY EYE RELIEF) 0.4-0.3 % GEL ophthalmic gel, Place 1 drop into both eyes 3 times daily, Disp: 10 mL, Rfl: 0  Allergies   Allergen Reactions    Latex Rash     condoms    Norco [Hydrocodone-Acetaminophen] Nausea Only    Pcn [Penicillins] Hives       Past Medical History:   Diagnosis Date    ADHD     Anxiety     Bipolar 1 disorder ([facility] [address]     Cephalgia 2/4/2019    Headache(784.0)     History of kidney stones     HTN (hypertension) 2/19/2015    Hypertension     Pain induced    Neuropathy     right ulnar    Palpitation 02/19/2015    follows Dr Lacey Louis    Right carpal tunnel syndrome      Past Surgical History:   Procedure Laterality Date    BACK SURGERY      BREAST LUMPECTOMY      CARPAL TUNNEL RELEASE Right 2/15/2019    RIGHT ENDOSCOPIC CARPAL TUNNEL RELEASE performed by Claude Rodrigez MD at [address] Left     lithotripsy    LUMBAR LAMINECTOMY  4/22/2015    discectomy L5 - S1    TN DISK SURG,ANTER,CERVICAL,SINGLE LVL N/A 7/20/2018    ANTERIOR CERVICAL DISCECTOMY AND FUSION  C5-C6  WITH PLATES, SCREWS, ONE C-ARM, REGULAR BED WITH HORSE SHOE - [employer] -- BOP performed by Caleb Tamez MD at [address] Right 2/15/2019    RIGHT ULNAR NERVE IN SITU DECOMPRESSION WITH TRANSPOSITION performed by Claude Rodrigez MD at [facility] OR     Family History   Problem Relation Age of Onset    COPD Mother     Cancer Mother     Colon Cancer Father     Seizures Sister     Other Sister         brain tumor    Cancer Paternal Aunt     Heart Disease Paternal Uncle     Heart Disease Paternal Grandmother      Social History     Socioeconomic History    Marital status:      Spouse name: Not on file    Number of children: Not on file    Years of education: Not on file    Highest education level: Not on file   Occupational History    Not on file   Tobacco Use    Smoking status: Every Day     Packs/day: 0.50     Years: 15.00     Pack years: 7.50     Types: Cigarettes    Smokeless tobacco: Never    Tobacco comments:     smoke a little less than a ppd   Vaping Use    Vaping Use: Never used   Substance and Sexual Activity    Alcohol use: No    Drug use: No     Comment: former marijuana user    Sexual activity: Not on file   Other Topics Concern    Not on file   Social History Narrative    Not on file     Social Determinants of Health     Financial Resource Strain: Low Risk     Difficulty of Paying Living Expenses: Not hard at all   Food Insecurity: No Food Insecurity    Worried About [address] in the Last Year: Never true    [address] [employer] in the Last Year: Never true   Transportation Needs: Unknown    Lack of Transportation (Medical): Not on file    Lack of Transportation (Non-Medical): No   Physical Activity: Not on file   Stress: Not on file   Social Connections: Not on file   Intimate Partner Violence: Not on file   Housing Stability: Unknown    Unable to Pay for Housing in the Last Year: Not on file    Number of Places Lived in the Last Year: Not on file    Unstable Housing in the Last Year: No       Vitals:    02/22/23 1115   BP: 124/86   Site: Left Upper Arm   Position: Sitting   Cuff Size: Large Adult   Pulse: 77   Resp: 17   Temp: 97.8 °F (36.6 °C)   TempSrc: Temporal   SpO2: 96%   Weight: 187 lb 4.8 oz (85 kg)   Height: 5' 10\" (1.778 m)       Physical Exam  Constitutional:       Appearance: Normal appearance. HENT:      Head: Normocephalic. Nose: Nose normal.   Eyes:      Pupils: Pupils are equal, round, and reactive to light.    Cardiovascular:      Rate and Rhythm: Normal rate and regular rhythm. Pulmonary:      Effort: Pulmonary effort is normal.   Abdominal:      General: Abdomen is flat. Palpations: Abdomen is soft. Musculoskeletal:         General: Normal range of motion. Skin:     General: Skin is warm and dry. Neurological:      General: No focal deficit present. Mental Status: He is alert. Mental status is at baseline.    Psychiatric:         Mood and Affect: Mood normal.              Seen By:  ACE Freire - RAMIREZ

## 2023-02-23 LAB
ALPHA-HYDROXYALPRAZOLAM, QUANTITATIVE, URINE: <50
ALPRAZOLAM URINE QUANT: <50
AMPHETAMINE QUANTITATIVE URINE: <100
COMMENT: NORMAL
INTEGRITY CHECK, CREATININE, URINE: 118.8
INTEGRITY CHECK, OXIDANT, URINE: <40
INTEGRITY CHECK, PH, URINE: 5.5 (ref 4.5–9)
INTEGRITY CHECK, SPECIFIC GRAVITY, URINE: 1.02 (ref 1–1.03)
INTEGRITY CHECK, SPECIMEN INTEGRITY, URINE: NORMAL

## 2023-02-28 NOTE — TELEPHONE ENCOUNTER
Rashid Allen does not have the Adderral XL 30mg, Sedrick SAMUEL has it. Advises patient he does not need RX, just go to [facility] in [place] and it will be filled. Patient understood.

## 2023-03-27 DIAGNOSIS — F41.9 ANXIETY: ICD-10-CM

## 2023-03-27 DIAGNOSIS — F98.8 ATTENTION DEFICIT DISORDER (ADD) WITHOUT HYPERACTIVITY: ICD-10-CM

## 2023-03-27 RX ORDER — GABAPENTIN 800 MG/1
800 TABLET ORAL 3 TIMES DAILY
Qty: 90 TABLET | Refills: 5 | Status: SHIPPED | OUTPATIENT
Start: 2023-03-27 | End: 2023-04-26

## 2023-03-27 RX ORDER — ONDANSETRON 4 MG/1
4 TABLET, FILM COATED ORAL 3 TIMES DAILY PRN
Qty: 30 TABLET | Refills: 2 | Status: SHIPPED | OUTPATIENT
Start: 2023-03-27

## 2023-03-27 RX ORDER — DEXTROAMPHETAMINE SACCHARATE, AMPHETAMINE ASPARTATE MONOHYDRATE, DEXTROAMPHETAMINE SULFATE AND AMPHETAMINE SULFATE 7.5; 7.5; 7.5; 7.5 MG/1; MG/1; MG/1; MG/1
30 CAPSULE, EXTENDED RELEASE ORAL EVERY MORNING
Qty: 30 CAPSULE | Refills: 0 | Status: SHIPPED | OUTPATIENT
Start: 2023-03-27 | End: 2023-04-26

## 2023-03-27 RX ORDER — ALPRAZOLAM 0.5 MG/1
TABLET ORAL
Qty: 30 TABLET | Refills: 2 | Status: SHIPPED | OUTPATIENT
Start: 2023-03-27 | End: 2023-06-24

## 2023-04-26 DIAGNOSIS — F98.8 ATTENTION DEFICIT DISORDER (ADD) WITHOUT HYPERACTIVITY: ICD-10-CM

## 2023-04-26 DIAGNOSIS — F41.9 ANXIETY: ICD-10-CM

## 2023-04-26 RX ORDER — ALPRAZOLAM 0.5 MG/1
TABLET ORAL
Qty: 30 TABLET | Refills: 2 | Status: SHIPPED | OUTPATIENT
Start: 2023-04-26 | End: 2023-07-24

## 2023-04-26 RX ORDER — GABAPENTIN 800 MG/1
800 TABLET ORAL 3 TIMES DAILY
Qty: 90 TABLET | Refills: 5 | Status: SHIPPED | OUTPATIENT
Start: 2023-04-26 | End: 2023-05-26

## 2023-04-26 RX ORDER — DEXTROAMPHETAMINE SACCHARATE, AMPHETAMINE ASPARTATE MONOHYDRATE, DEXTROAMPHETAMINE SULFATE AND AMPHETAMINE SULFATE 7.5; 7.5; 7.5; 7.5 MG/1; MG/1; MG/1; MG/1
30 CAPSULE, EXTENDED RELEASE ORAL EVERY MORNING
Qty: 30 CAPSULE | Refills: 0 | Status: SHIPPED | OUTPATIENT
Start: 2023-04-26 | End: 2023-05-26

## 2023-05-24 ENCOUNTER — OFFICE VISIT (OUTPATIENT)
Dept: FAMILY MEDICINE CLINIC | Age: 43
End: 2023-05-24
Payer: COMMERCIAL

## 2023-05-24 VITALS
WEIGHT: 179.4 LBS | RESPIRATION RATE: 17 BRPM | DIASTOLIC BLOOD PRESSURE: 72 MMHG | BODY MASS INDEX: 25.74 KG/M2 | SYSTOLIC BLOOD PRESSURE: 118 MMHG | TEMPERATURE: 97.7 F | HEART RATE: 75 BPM | OXYGEN SATURATION: 96 %

## 2023-05-24 DIAGNOSIS — E78.01 FAMILIAL HYPERCHOLESTEROLEMIA: ICD-10-CM

## 2023-05-24 DIAGNOSIS — F98.8 ATTENTION DEFICIT DISORDER (ADD) WITHOUT HYPERACTIVITY: ICD-10-CM

## 2023-05-24 DIAGNOSIS — I10 ESSENTIAL HYPERTENSION: Primary | Chronic | ICD-10-CM

## 2023-05-24 PROCEDURE — G8427 DOCREV CUR MEDS BY ELIG CLIN: HCPCS | Performed by: NURSE PRACTITIONER

## 2023-05-24 PROCEDURE — 99213 OFFICE O/P EST LOW 20 MIN: CPT | Performed by: NURSE PRACTITIONER

## 2023-05-24 PROCEDURE — G8419 CALC BMI OUT NRM PARAM NOF/U: HCPCS | Performed by: NURSE PRACTITIONER

## 2023-05-24 PROCEDURE — 3074F SYST BP LT 130 MM HG: CPT | Performed by: NURSE PRACTITIONER

## 2023-05-24 PROCEDURE — 4004F PT TOBACCO SCREEN RCVD TLK: CPT | Performed by: NURSE PRACTITIONER

## 2023-05-24 PROCEDURE — 3078F DIAST BP <80 MM HG: CPT | Performed by: NURSE PRACTITIONER

## 2023-05-24 RX ORDER — DEXTROAMPHETAMINE SACCHARATE, AMPHETAMINE ASPARTATE MONOHYDRATE, DEXTROAMPHETAMINE SULFATE AND AMPHETAMINE SULFATE 7.5; 7.5; 7.5; 7.5 MG/1; MG/1; MG/1; MG/1
30 CAPSULE, EXTENDED RELEASE ORAL EVERY MORNING
Qty: 30 CAPSULE | Refills: 0 | Status: SHIPPED | OUTPATIENT
Start: 2023-05-24 | End: 2023-06-23

## 2023-05-24 ASSESSMENT — ENCOUNTER SYMPTOMS
NAUSEA: 0
VOMITING: 0
DIARRHEA: 0
RECTAL PAIN: 0
BACK PAIN: 1
ABDOMINAL PAIN: 0
BLOOD IN STOOL: 0

## 2023-05-24 NOTE — PROGRESS NOTES
0.50     Years: 15.00     Pack years: 7.50     Types: Cigarettes    Smokeless tobacco: Never    Tobacco comments:     smoke a little less than a ppd   Vaping Use    Vaping Use: Never used   Substance and Sexual Activity    Alcohol use: No    Drug use: No     Comment: former marijuana user    Sexual activity: Not on file   Other Topics Concern    Not on file   Social History Narrative    Not on file     Social Determinants of Health     Financial Resource Strain: Low Risk     Difficulty of Paying Living Expenses: Not hard at all   Food Insecurity: No Food Insecurity    Worried About [address] [employer] in the Last Year: Never true    Ran Out of Food in the Last Year: Never true   Transportation Needs: Unknown    Lack of Transportation (Medical): Not on file    Lack of Transportation (Non-Medical): No   Physical Activity: Not on file   Stress: Not on file   Social Connections: Not on file   Intimate Partner Violence: Not on file   Housing Stability: Unknown    Unable to Pay for Housing in the Last Year: Not on file    Number of Places Lived in the Last Year: Not on file    Unstable Housing in the Last Year: No       Vitals:    05/24/23 1054   BP: 118/72   Pulse: 75   Resp: 17   Temp: 97.7 °F (36.5 °C)   TempSrc: Temporal   SpO2: 96%   Weight: 179 lb 6.4 oz (81.4 kg)       Physical Exam  Constitutional:       Appearance: Normal appearance. HENT:      Head: Normocephalic. Nose: Nose normal.   Eyes:      Pupils: Pupils are equal, round, and reactive to light. Cardiovascular:      Rate and Rhythm: Normal rate and regular rhythm. Pulmonary:      Effort: Pulmonary effort is normal.   Abdominal:      General: Abdomen is flat. Palpations: Abdomen is soft. Musculoskeletal:         General: Normal range of motion. Skin:     General: Skin is warm and dry. Neurological:      General: No focal deficit present. Mental Status: He is alert. Mental status is at baseline.    Psychiatric:         Mood and

## 2023-06-20 DIAGNOSIS — F98.8 ATTENTION DEFICIT DISORDER (ADD) WITHOUT HYPERACTIVITY: ICD-10-CM

## 2023-06-20 RX ORDER — DEXTROAMPHETAMINE SACCHARATE, AMPHETAMINE ASPARTATE MONOHYDRATE, DEXTROAMPHETAMINE SULFATE AND AMPHETAMINE SULFATE 7.5; 7.5; 7.5; 7.5 MG/1; MG/1; MG/1; MG/1
30 CAPSULE, EXTENDED RELEASE ORAL EVERY MORNING
Qty: 30 CAPSULE | Refills: 0 | Status: SHIPPED
Start: 2023-06-20 | End: 2023-06-21 | Stop reason: SDUPTHER

## 2023-06-21 DIAGNOSIS — F98.8 ATTENTION DEFICIT DISORDER (ADD) WITHOUT HYPERACTIVITY: ICD-10-CM

## 2023-06-21 RX ORDER — DEXTROAMPHETAMINE SACCHARATE, AMPHETAMINE ASPARTATE MONOHYDRATE, DEXTROAMPHETAMINE SULFATE AND AMPHETAMINE SULFATE 7.5; 7.5; 7.5; 7.5 MG/1; MG/1; MG/1; MG/1
30 CAPSULE, EXTENDED RELEASE ORAL EVERY MORNING
Qty: 30 CAPSULE | Refills: 0 | Status: SHIPPED | OUTPATIENT
Start: 2023-06-21 | End: 2023-07-21

## 2023-06-21 NOTE — TELEPHONE ENCOUNTER
Refill for Adderall cannot be filled at [facility]. So I called there to cancel that rx.     Send rx to [facility]

## 2023-07-17 DIAGNOSIS — F98.8 ATTENTION DEFICIT DISORDER (ADD) WITHOUT HYPERACTIVITY: ICD-10-CM

## 2023-07-17 RX ORDER — DEXTROAMPHETAMINE SACCHARATE, AMPHETAMINE ASPARTATE MONOHYDRATE, DEXTROAMPHETAMINE SULFATE AND AMPHETAMINE SULFATE 7.5; 7.5; 7.5; 7.5 MG/1; MG/1; MG/1; MG/1
30 CAPSULE, EXTENDED RELEASE ORAL EVERY MORNING
Qty: 30 CAPSULE | Refills: 0 | Status: SHIPPED | OUTPATIENT
Start: 2023-07-17 | End: 2023-08-16

## 2023-07-17 NOTE — TELEPHONE ENCOUNTER
Patients last appointment 5/24/2023. Patients next scheduled appointment   Future Appointments   Date Time Provider [address]  [address]   8/16/2023  8:00 AM SCHEDULE, KAITLYNN COULTER PAL TOBY [facility]   8/23/2023 10:15 AM ACE Diallo CNP PAL TOBY [facility]

## 2023-07-21 DIAGNOSIS — F41.9 ANXIETY: ICD-10-CM

## 2023-07-21 RX ORDER — ALPRAZOLAM 0.5 MG/1
TABLET ORAL
Qty: 30 TABLET | Refills: 2 | Status: SHIPPED | OUTPATIENT
Start: 2023-07-21 | End: 2023-10-18

## 2023-07-21 RX ORDER — GABAPENTIN 800 MG/1
800 TABLET ORAL 3 TIMES DAILY
Qty: 90 TABLET | Refills: 5 | Status: SHIPPED | OUTPATIENT
Start: 2023-07-21 | End: 2024-01-17

## 2023-08-15 DIAGNOSIS — F98.8 ATTENTION DEFICIT DISORDER (ADD) WITHOUT HYPERACTIVITY: ICD-10-CM

## 2023-08-15 DIAGNOSIS — F41.9 ANXIETY: ICD-10-CM

## 2023-08-15 RX ORDER — GABAPENTIN 800 MG/1
800 TABLET ORAL 3 TIMES DAILY
Qty: 90 TABLET | Refills: 5 | Status: SHIPPED | OUTPATIENT
Start: 2023-08-15 | End: 2024-02-11

## 2023-08-15 RX ORDER — ALPRAZOLAM 0.5 MG/1
TABLET ORAL
Qty: 30 TABLET | Refills: 2 | Status: SHIPPED | OUTPATIENT
Start: 2023-08-15 | End: 2023-11-12

## 2023-08-15 RX ORDER — DEXTROAMPHETAMINE SACCHARATE, AMPHETAMINE ASPARTATE MONOHYDRATE, DEXTROAMPHETAMINE SULFATE AND AMPHETAMINE SULFATE 7.5; 7.5; 7.5; 7.5 MG/1; MG/1; MG/1; MG/1
30 CAPSULE, EXTENDED RELEASE ORAL EVERY MORNING
Qty: 30 CAPSULE | Refills: 0 | Status: SHIPPED | OUTPATIENT
Start: 2023-08-15 | End: 2023-09-14

## 2023-09-21 DIAGNOSIS — G56.21 ULNAR NEUROPATHY AT ELBOW, RIGHT: ICD-10-CM

## 2023-09-21 RX ORDER — IBUPROFEN 800 MG/1
800 TABLET ORAL EVERY 6 HOURS PRN
Qty: 120 TABLET | Refills: 1 | Status: SHIPPED | OUTPATIENT
Start: 2023-09-21 | End: 2023-11-20

## 2023-09-21 NOTE — TELEPHONE ENCOUNTER
April, nurse from the [facility], called and is requesting an order for Ibuprofen for the patient as they do no have a doctor on staff right now. Once signed it needs to be faxed to [phone].

## 2023-12-02 NOTE — PROGRESS NOTES
Patient is here for follow up for neck and bilateral shoulder pain (7/10). He is also having low back and left leg pain. Physical exam  Alert and Oriented X3  PERRLA, EOMI  BERMUDEZ 5/5  Sensation to light touch decreased globally in left leg and arm  Reflexes are 2+ and symmetric    A/P: patient is here for follow up for: neck and bilateral shoulder pain and low back and left leg pain. Cervical x-rays reveal C5-6 fusion with no hardware failure. There is a dynamic kyphotic deformity at C4-5. Lumbar x-rays show good alignment, L5-S1 DDD. He is currently in PT. He may continue with gabapentin, advil if beneficial.  If no improvement with conservative treatment, we will order a cervical and lumbar MRI. He was instructed to stop smoking. 20% in 1 year

## 2024-01-09 ENCOUNTER — OFFICE VISIT (OUTPATIENT)
Dept: FAMILY MEDICINE CLINIC | Age: 44
End: 2024-01-09

## 2024-01-09 VITALS
HEIGHT: 70 IN | SYSTOLIC BLOOD PRESSURE: 138 MMHG | RESPIRATION RATE: 20 BRPM | OXYGEN SATURATION: 96 % | BODY MASS INDEX: 26.7 KG/M2 | TEMPERATURE: 97.9 F | DIASTOLIC BLOOD PRESSURE: 80 MMHG | WEIGHT: 186.5 LBS | HEART RATE: 85 BPM

## 2024-01-09 DIAGNOSIS — G40.319 INTRACTABLE GENERALIZED IDIOPATHIC EPILEPSY WITHOUT STATUS EPILEPTICUS (HCC): ICD-10-CM

## 2024-01-09 DIAGNOSIS — Z72.0 TOBACCO USE: ICD-10-CM

## 2024-01-09 DIAGNOSIS — F31.9 BIPOLAR AFFECTIVE DISORDER, REMISSION STATUS UNSPECIFIED (HCC): Primary | ICD-10-CM

## 2024-01-09 DIAGNOSIS — F98.8 ATTENTION DEFICIT DISORDER (ADD) WITHOUT HYPERACTIVITY: ICD-10-CM

## 2024-01-09 DIAGNOSIS — I10 ESSENTIAL HYPERTENSION: ICD-10-CM

## 2024-01-09 DIAGNOSIS — E78.01 FAMILIAL HYPERCHOLESTEROLEMIA: ICD-10-CM

## 2024-01-09 PROCEDURE — 99213 OFFICE O/P EST LOW 20 MIN: CPT | Performed by: NURSE PRACTITIONER

## 2024-01-09 PROCEDURE — 3079F DIAST BP 80-89 MM HG: CPT | Performed by: NURSE PRACTITIONER

## 2024-01-09 PROCEDURE — 3075F SYST BP GE 130 - 139MM HG: CPT | Performed by: NURSE PRACTITIONER

## 2024-01-09 RX ORDER — DEXTROAMPHETAMINE SACCHARATE, AMPHETAMINE ASPARTATE MONOHYDRATE, DEXTROAMPHETAMINE SULFATE AND AMPHETAMINE SULFATE 7.5; 7.5; 7.5; 7.5 MG/1; MG/1; MG/1; MG/1
30 CAPSULE, EXTENDED RELEASE ORAL DAILY
Qty: 30 CAPSULE | Refills: 0 | Status: SHIPPED | OUTPATIENT
Start: 2024-01-09 | End: 2024-02-08

## 2024-01-09 RX ORDER — ALPRAZOLAM 0.5 MG/1
0.5 TABLET ORAL NIGHTLY PRN
COMMUNITY
Start: 2023-12-12 | End: 2024-01-09 | Stop reason: SDUPTHER

## 2024-01-09 RX ORDER — GABAPENTIN 800 MG/1
800 TABLET ORAL 3 TIMES DAILY
Qty: 90 TABLET | Refills: 5 | Status: SHIPPED | OUTPATIENT
Start: 2024-01-09 | End: 2024-07-07

## 2024-01-09 RX ORDER — ALPRAZOLAM 0.5 MG/1
0.5 TABLET ORAL NIGHTLY PRN
Qty: 30 TABLET | Refills: 2 | Status: SHIPPED | OUTPATIENT
Start: 2024-01-09 | End: 2024-04-08

## 2024-01-09 RX ORDER — DEXTROAMPHETAMINE SACCHARATE, AMPHETAMINE ASPARTATE MONOHYDRATE, DEXTROAMPHETAMINE SULFATE AND AMPHETAMINE SULFATE 7.5; 7.5; 7.5; 7.5 MG/1; MG/1; MG/1; MG/1
30 CAPSULE, EXTENDED RELEASE ORAL DAILY
Qty: 30 CAPSULE | Refills: 0 | Status: SHIPPED | OUTPATIENT
Start: 2024-03-09 | End: 2024-04-08

## 2024-01-09 RX ORDER — DEXTROAMPHETAMINE SACCHARATE, AMPHETAMINE ASPARTATE MONOHYDRATE, DEXTROAMPHETAMINE SULFATE AND AMPHETAMINE SULFATE 7.5; 7.5; 7.5; 7.5 MG/1; MG/1; MG/1; MG/1
30 CAPSULE, EXTENDED RELEASE ORAL DAILY
Qty: 30 CAPSULE | Refills: 0 | Status: SHIPPED | OUTPATIENT
Start: 2024-02-08 | End: 2024-03-09

## 2024-01-09 RX ORDER — DEXTROAMPHETAMINE SACCHARATE, AMPHETAMINE ASPARTATE MONOHYDRATE, DEXTROAMPHETAMINE SULFATE AND AMPHETAMINE SULFATE 7.5; 7.5; 7.5; 7.5 MG/1; MG/1; MG/1; MG/1
30 CAPSULE, EXTENDED RELEASE ORAL EVERY MORNING
Qty: 30 CAPSULE | Refills: 0 | Status: CANCELLED | OUTPATIENT
Start: 2024-01-09 | End: 2024-02-08

## 2024-01-09 SDOH — ECONOMIC STABILITY: FOOD INSECURITY: WITHIN THE PAST 12 MONTHS, YOU WORRIED THAT YOUR FOOD WOULD RUN OUT BEFORE YOU GOT MONEY TO BUY MORE.: PATIENT DECLINED

## 2024-01-09 SDOH — ECONOMIC STABILITY: FOOD INSECURITY: WITHIN THE PAST 12 MONTHS, THE FOOD YOU BOUGHT JUST DIDN'T LAST AND YOU DIDN'T HAVE MONEY TO GET MORE.: PATIENT DECLINED

## 2024-01-09 SDOH — ECONOMIC STABILITY: HOUSING INSECURITY
IN THE LAST 12 MONTHS, WAS THERE A TIME WHEN YOU DID NOT HAVE A STEADY PLACE TO SLEEP OR SLEPT IN A SHELTER (INCLUDING NOW)?: PATIENT DECLINED

## 2024-01-09 SDOH — ECONOMIC STABILITY: INCOME INSECURITY
HOW HARD IS IT FOR YOU TO PAY FOR THE VERY BASICS LIKE FOOD, HOUSING, MEDICAL CARE, AND HEATING?: PATIENT UNABLE TO ANSWER

## 2024-01-09 ASSESSMENT — PATIENT HEALTH QUESTIONNAIRE - PHQ9
SUM OF ALL RESPONSES TO PHQ QUESTIONS 1-9: 1
5. POOR APPETITE OR OVEREATING: 0
1. LITTLE INTEREST OR PLEASURE IN DOING THINGS: 0
SUM OF ALL RESPONSES TO PHQ QUESTIONS 1-9: 1
9. THOUGHTS THAT YOU WOULD BE BETTER OFF DEAD, OR OF HURTING YOURSELF: 0
7. TROUBLE CONCENTRATING ON THINGS, SUCH AS READING THE NEWSPAPER OR WATCHING TELEVISION: 1
2. FEELING DOWN, DEPRESSED OR HOPELESS: 0
10. IF YOU CHECKED OFF ANY PROBLEMS, HOW DIFFICULT HAVE THESE PROBLEMS MADE IT FOR YOU TO DO YOUR WORK, TAKE CARE OF THINGS AT HOME, OR GET ALONG WITH OTHER PEOPLE: 0
SUM OF ALL RESPONSES TO PHQ9 QUESTIONS 1 & 2: 0
SUM OF ALL RESPONSES TO PHQ QUESTIONS 1-9: 1
4. FEELING TIRED OR HAVING LITTLE ENERGY: 0
SUM OF ALL RESPONSES TO PHQ QUESTIONS 1-9: 1
3. TROUBLE FALLING OR STAYING ASLEEP: 0
6. FEELING BAD ABOUT YOURSELF - OR THAT YOU ARE A FAILURE OR HAVE LET YOURSELF OR YOUR FAMILY DOWN: 0
8. MOVING OR SPEAKING SO SLOWLY THAT OTHER PEOPLE COULD HAVE NOTICED. OR THE OPPOSITE, BEING SO FIGETY OR RESTLESS THAT YOU HAVE BEEN MOVING AROUND A LOT MORE THAN USUAL: 0

## 2024-01-09 ASSESSMENT — ENCOUNTER SYMPTOMS
DIARRHEA: 0
RECTAL PAIN: 0
NAUSEA: 0
VOMITING: 0
ABDOMINAL PAIN: 0
BACK PAIN: 1
BLOOD IN STOOL: 0

## 2024-01-09 NOTE — PROGRESS NOTES
Mahin Cox : 1980 Sex: male  Age: 43 y.o.    Chief Complaint   Patient presents with    Medication Check     3 month med check to get back on his medications has been off for like 6 month since in alf    Medication Refill       Assessment and Plan:    Mahin was seen today for medication check and medication refill.    Diagnoses and all orders for this visit:    Bipolar affective disorder, remission status unspecified (HCC)  -     ALPRAZolam (XANAX) 0.5 MG tablet; Take 1 tablet by mouth nightly as needed for Sleep for up to 90 days. Max Daily Amount: 0.5 mg    Attention deficit disorder (ADD) without hyperactivity  -     amphetamine-dextroamphetamine (ADDERALL XR) 30 MG extended release capsule; Take 1 capsule by mouth daily for 30 days. Max Daily Amount: 30 mg  -     amphetamine-dextroamphetamine (ADDERALL XR) 30 MG extended release capsule; Take 1 capsule by mouth daily for 30 days. Max Daily Amount: 30 mg  -     amphetamine-dextroamphetamine (ADDERALL XR) 30 MG extended release capsule; Take 1 capsule by mouth daily for 30 days. Max Daily Amount: 30 mg    Intractable generalized idiopathic epilepsy without status epilepticus (HCC)  -     ALPRAZolam (XANAX) 0.5 MG tablet; Take 1 tablet by mouth nightly as needed for Sleep for up to 90 days. Max Daily Amount: 0.5 mg    Essential hypertension    Familial hypercholesterolemia    Tobacco use    Other orders  -     gabapentin (NEURONTIN) 800 MG tablet; Take 1 tablet by mouth 3 times daily for 180 days.        USPTF:    No results found for: \"LABA1C\"  No results found for: \"EAG\"   Abnormal Blood Glucose and Type 2 Diabetes Mellitus: Screening -- Adults aged 40 to 70 years who are overweight or obese Grade: B (Recommended)    BP Readings from Last 3 Encounters:   24 138/80   23 118/72   23 124/86     High Blood Pressure: Screening and Home Monitoring -- Adults  Grade: A (Recommended) recommends screening for high blood pressure in

## 2024-02-01 DIAGNOSIS — I10 ESSENTIAL HYPERTENSION: ICD-10-CM

## 2024-02-01 DIAGNOSIS — G56.21 ULNAR NEUROPATHY AT ELBOW, RIGHT: ICD-10-CM

## 2024-02-01 RX ORDER — DULOXETIN HYDROCHLORIDE 60 MG/1
60 CAPSULE, DELAYED RELEASE ORAL 2 TIMES DAILY
Qty: 180 CAPSULE | Refills: 1 | Status: SHIPPED | OUTPATIENT
Start: 2024-02-01

## 2024-02-01 RX ORDER — AMLODIPINE BESYLATE 5 MG/1
5 TABLET ORAL DAILY
Qty: 90 TABLET | Refills: 1 | Status: SHIPPED | OUTPATIENT
Start: 2024-02-01

## 2024-02-01 RX ORDER — IBUPROFEN 800 MG/1
800 TABLET ORAL EVERY 6 HOURS PRN
Qty: 120 TABLET | Refills: 1 | Status: SHIPPED | OUTPATIENT
Start: 2024-02-01 | End: 2024-04-01

## 2024-02-01 RX ORDER — LAMOTRIGINE 200 MG/1
TABLET ORAL
Qty: 90 TABLET | Refills: 1 | Status: SHIPPED | OUTPATIENT
Start: 2024-02-01

## 2024-02-01 RX ORDER — ATORVASTATIN CALCIUM 10 MG/1
10 TABLET, FILM COATED ORAL DAILY
Qty: 90 TABLET | Refills: 1 | Status: SHIPPED | OUTPATIENT
Start: 2024-02-01

## 2024-02-01 NOTE — TELEPHONE ENCOUNTER
Refill.  Just got out of correction, so he does not have his pill bottles, he was not sure of many of the mg.  But states nothing has changed.        AND NILAY IS ASKING FOR A RX TO TREAT HIS SINUS/ALLERGY ISSUE-runny itchy nose, eyes are itchy and puffy in the mornings, sometimes his ears hurt.

## 2024-03-26 ENCOUNTER — OFFICE VISIT (OUTPATIENT)
Dept: FAMILY MEDICINE CLINIC | Age: 44
End: 2024-03-26

## 2024-03-26 VITALS
BODY MASS INDEX: 26.63 KG/M2 | DIASTOLIC BLOOD PRESSURE: 62 MMHG | HEIGHT: 70 IN | RESPIRATION RATE: 17 BRPM | HEART RATE: 89 BPM | OXYGEN SATURATION: 98 % | WEIGHT: 186 LBS | SYSTOLIC BLOOD PRESSURE: 122 MMHG | TEMPERATURE: 97 F

## 2024-03-26 DIAGNOSIS — I10 ESSENTIAL HYPERTENSION: ICD-10-CM

## 2024-03-26 DIAGNOSIS — F31.9 BIPOLAR AFFECTIVE DISORDER, REMISSION STATUS UNSPECIFIED (HCC): ICD-10-CM

## 2024-03-26 DIAGNOSIS — F98.8 ATTENTION DEFICIT DISORDER (ADD) WITHOUT HYPERACTIVITY: Primary | ICD-10-CM

## 2024-03-26 DIAGNOSIS — F41.9 ANXIETY: ICD-10-CM

## 2024-03-26 DIAGNOSIS — Z98.890 HISTORY OF LUMBAR LAMINECTOMY: ICD-10-CM

## 2024-03-26 DIAGNOSIS — E78.01 FAMILIAL HYPERCHOLESTEROLEMIA: ICD-10-CM

## 2024-03-26 DIAGNOSIS — Z72.0 TOBACCO USE: ICD-10-CM

## 2024-03-26 DIAGNOSIS — M54.16 LUMBAR RADICULOPATHY: ICD-10-CM

## 2024-03-26 PROCEDURE — 3074F SYST BP LT 130 MM HG: CPT | Performed by: NURSE PRACTITIONER

## 2024-03-26 PROCEDURE — 99213 OFFICE O/P EST LOW 20 MIN: CPT | Performed by: NURSE PRACTITIONER

## 2024-03-26 PROCEDURE — 3078F DIAST BP <80 MM HG: CPT | Performed by: NURSE PRACTITIONER

## 2024-03-26 RX ORDER — DEXTROAMPHETAMINE SACCHARATE, AMPHETAMINE ASPARTATE MONOHYDRATE, DEXTROAMPHETAMINE SULFATE AND AMPHETAMINE SULFATE 7.5; 7.5; 7.5; 7.5 MG/1; MG/1; MG/1; MG/1
30 CAPSULE, EXTENDED RELEASE ORAL DAILY
Qty: 30 CAPSULE | Refills: 0 | Status: SHIPPED | OUTPATIENT
Start: 2024-05-25 | End: 2024-06-24

## 2024-03-26 RX ORDER — DEXTROAMPHETAMINE SACCHARATE, AMPHETAMINE ASPARTATE MONOHYDRATE, DEXTROAMPHETAMINE SULFATE AND AMPHETAMINE SULFATE 7.5; 7.5; 7.5; 7.5 MG/1; MG/1; MG/1; MG/1
30 CAPSULE, EXTENDED RELEASE ORAL DAILY
Qty: 30 CAPSULE | Refills: 0 | Status: SHIPPED | OUTPATIENT
Start: 2024-04-25 | End: 2024-05-25

## 2024-03-26 RX ORDER — DEXTROAMPHETAMINE SACCHARATE, AMPHETAMINE ASPARTATE MONOHYDRATE, DEXTROAMPHETAMINE SULFATE AND AMPHETAMINE SULFATE 7.5; 7.5; 7.5; 7.5 MG/1; MG/1; MG/1; MG/1
30 CAPSULE, EXTENDED RELEASE ORAL DAILY
Qty: 30 CAPSULE | Refills: 0 | Status: SHIPPED | OUTPATIENT
Start: 2024-03-26 | End: 2024-04-25

## 2024-03-26 ASSESSMENT — ENCOUNTER SYMPTOMS
DIARRHEA: 0
RECTAL PAIN: 0
NAUSEA: 0
BACK PAIN: 1
ABDOMINAL PAIN: 0
BLOOD IN STOOL: 0
VOMITING: 0

## 2024-03-26 NOTE — PROGRESS NOTES
3 months (around 6/26/2024) for LABS SOON.    Patient is here today to review medication and have his 3-month checkup on the Adderall and Xanax.   Is going down to [place] to help his family member move to the region and may need his prescription filled earlier otherwise   reports no problems at this time dosage seems to be working well.  Feels to be more productive and focusing is exponentially better.  There has been no reports of chest pain, palpitations, nausea, vomiting, or uncontrolled weight loss.  Denies any shortness of breath.  Patient continues to do optimal on the medication which speaks to a good diagnosis and medication matching.         CHRONIC CONDITION:    HTN: Stable hypertension, controlled on   amLODIPine (NORVASC) 5 MG tablet, Take 1 tablet by mouth daily take 1 tablet by mouth once daily, Disp: 90 tablet, Rfl: 1 remains at a mild intensity but overall good control, without symptoms, no ringing in the ears, no headaches and no nose bleeds. Better on medications.    CAD and CHF: Patient currently on a Betablocker and an ACE ARB for LVSD.     Tobacco Use: N    Flu Shot Up To Date: Y     Meds Listed in Medical Record: Y     Hyperlipidemia: Mild in intensity but controlled on   atorvastatin (LIPITOR) 10 MG tablet, Take 1 tablet by mouth daily, Disp: 90 tablet, Rfl: 11, without symptoms, no complications with dietary treatment regimen reporting no side effects or intolereances. Compliant with treatment and diet. No muscle aches, new joint pains or abd pain.    Depression/Anxiety: Stable depression with generalized anxiety. Mild in intensity but well controlled on   amphetamine-dextroamphetamine (ADDERALL XR) 30 MG extended release capsule, Take 1 capsule by mouth every morning for 30 days., Disp: 30 capsule, Rfl: 0  DULoxetine (CYMBALTA) 60 MG extended release capsule, Take 1 capsule by mouth 2 times daily, Disp: 180 capsule, Rfl: 1  lamoTRIgine (LAMICTAL) 200 MG tablet, take 1 tablet by mouth once

## (undated) DEVICE — 1000 S-DRAPE TOWEL DRAPE 10/BX: Brand: STERI-DRAPE™

## (undated) DEVICE — SET NEURO BLKBELT RETRACTOR

## (undated) DEVICE — SOLUTION IV IRRIG WATER 1000ML POUR BRL 2F7114

## (undated) DEVICE — Device

## (undated) DEVICE — SOLUTION IV IRRIG POUR BRL 0.9% SODIUM CHL 2F7124

## (undated) DEVICE — DRILL SYSTEM 7

## (undated) DEVICE — PADDING UNDERCAST W4INXL4YD COT FBR LO LINTING WYTEX

## (undated) DEVICE — LABEL MED 4 IN SURG PANEL W/ PEN STRL

## (undated) DEVICE — INTENDED FOR TISSUE SEPARATION, AND OTHER PROCEDURES THAT REQUIRE A SHARP SURGICAL BLADE TO PUNCTURE OR CUT.: Brand: BARD-PARKER ® STAINLESS STEEL BLADES

## (undated) DEVICE — 3.0MM NEURO (MATCH HEAD) SOFT TOUCH

## (undated) DEVICE — SPLINT ORTH W5XL30IN PLSTR OF PARIS FAST IMMOB OF FRAC HI

## (undated) DEVICE — STANDARD (U) BLADE ASSEMBLY 6PK: Brand: MICROAIRE®

## (undated) DEVICE — SET NEURO BLK BELT MICRO DISCECTOMY

## (undated) DEVICE — EXTRAS UGOKWE

## (undated) DEVICE — GOWN,SIRUS,FABRNF,L,20/CS: Brand: MEDLINE

## (undated) DEVICE — SPONGE SURG WHT KTNR DISECT RADPQ ST

## (undated) DEVICE — TOWEL,OR,DSP,ST,BLUE,DLX,10/PK,8PK/CS: Brand: MEDLINE

## (undated) DEVICE — DOUBLE BASIN SET: Brand: MEDLINE INDUSTRIES, INC.

## (undated) DEVICE — COVER,TABLE,60X90,STERILE: Brand: MEDLINE

## (undated) DEVICE — PADDING,UNDERCAST,COTTON, 3X4YD STERILE: Brand: MEDLINE

## (undated) DEVICE — BANDAGE,GAUZE,4.5"X4.1YD,STERILE,LF: Brand: MEDLINE

## (undated) DEVICE — CRADLE ARM W8.75XH12.5XL16IN FOAM SUPP ELEVATION VENT

## (undated) DEVICE — DRAPE,U/ SHT,SPLIT,PLAS,STERIL: Brand: MEDLINE

## (undated) DEVICE — SURGICAL PROCEDURE PACK LAMINECTOMY LUMBAR

## (undated) DEVICE — 3M(TM) MEDIPORE(TM) +PAD SOFT CLOTH ADHESIVE WOUND DRESSING 3569: Brand: 3M™ MEDIPORE™

## (undated) DEVICE — 3M™ IOBAN™ 2 ANTIMICROBIAL INCISE DRAPE 6640EZ: Brand: IOBAN™ 2

## (undated) DEVICE — GLOVE ORANGE PI 7   MSG9070

## (undated) DEVICE — BLADE ES ELASTOMERIC COAT INSUL DURABLE BEND UPTO 90DEG

## (undated) DEVICE — INSTRUMENT 7080902 PLATE HOLDING PIN

## (undated) DEVICE — SPONGE,DISSECTOR,ROUND CHERRY,XR,ST,5/PK: Brand: MEDLINE

## (undated) DEVICE — PADDING,UNDERCAST,COTTON, 4"X4YD STERILE: Brand: MEDLINE

## (undated) DEVICE — BLADE CLIPPER GEN PURP NS

## (undated) DEVICE — DRILL BIT 7080510 11 MM DRILL BIT S

## (undated) DEVICE — MASTISOL ADHESIVE LIQ 2/3ML

## (undated) DEVICE — GAUZE,SPONGE,4"X4",16PLY,XRAY,STRL,LF: Brand: MEDLINE

## (undated) DEVICE — GLOVE SURG SZ 65 THK91MIL LTX FREE SYN POLYISOPRENE

## (undated) DEVICE — SUTURE VIC +  4-0 27 IN PS1 UD VCP935H

## (undated) DEVICE — BANDAGE COMPR W3INXL5YD WHT BGE POLY COT M E WRP WV HK AND

## (undated) DEVICE — TUBING, SUCTION, 3/16" X 12', STRAIGHT: Brand: MEDLINE

## (undated) DEVICE — GRADUATE

## (undated) DEVICE — CODMAN® SURGICAL PATTIES 1/2" X 1/2" (1.27CM X 1.27CM): Brand: CODMAN®

## (undated) DEVICE — GOWN,SIRUS,FABRNF,XL,20/CS: Brand: MEDLINE

## (undated) DEVICE — READY WET SKIN SCRUB TRAY-LF: Brand: MEDLINE INDUSTRIES, INC.

## (undated) DEVICE — SURGICAL PROCEDURE PACK HND

## (undated) DEVICE — CASPAR DISTR PIN12MMSTER: Brand: AESCULAP

## (undated) DEVICE — SHEET, T, LAPAROTOMY, STERILE: Brand: MEDLINE

## (undated) DEVICE — SET SPINAL NEURO STNEU1

## (undated) DEVICE — BNDG,ELSTC,MATRIX,STRL,3"X5YD,LF,HOOK&LP: Brand: MEDLINE

## (undated) DEVICE — TUBING SUCT 12FR MAL ALUM SHFT FN CAP VENT UNIV CONN W/ OBT

## (undated) DEVICE — STRIP,CLOSURE,WOUND,MEDI-STRIP,1/2X4: Brand: MEDLINE

## (undated) DEVICE — STANDARD HYPODERMIC NEEDLE,POLYPROPYLENE HUB: Brand: MONOJECT

## (undated) DEVICE — LOOP VES W25MM THK1MM MAXI RED SIL FLD REPELLENT 100 PER

## (undated) DEVICE — NEEDLE SPNL L3.5IN PNK HUB S STL REG WALL FIT STYL W/ QNCKE

## (undated) DEVICE — CODMAN® SURGICAL PATTIES 1/2" X 1" (1.27CM X 2.54CM): Brand: CODMAN®

## (undated) DEVICE — ZIMMER® STERILE DISPOSABLE TOURNIQUET CUFF WITH PLC, DUAL PORT, SINGLE BLADDER, 18 IN. (46 CM)

## (undated) DEVICE — Z CONVERTED USE 2275207 CLOTH PREP W7.5XL7.5IN 2% CHG SKIN ALC AND RNS FREE

## (undated) DEVICE — INSTRUMENT EXTRAS ACF SINGLE ORANGE OR W

## (undated) DEVICE — 4-PORT MANIFOLD: Brand: NEPTUNE 2

## (undated) DEVICE — SKIN AFFIX SURG ADHESIVE 72/CS 0.55ML: Brand: MEDLINE

## (undated) DEVICE — GLOVE ORANGE PI 8   MSG9080

## (undated) DEVICE — 1.5L THIN WALL CAN: Brand: CRD